# Patient Record
Sex: MALE | Race: WHITE
[De-identification: names, ages, dates, MRNs, and addresses within clinical notes are randomized per-mention and may not be internally consistent; named-entity substitution may affect disease eponyms.]

---

## 2017-04-05 ENCOUNTER — MEDICATION RENEWAL (OUTPATIENT)
Age: 62
End: 2017-04-05

## 2017-05-16 ENCOUNTER — APPOINTMENT (OUTPATIENT)
Dept: CARDIOLOGY | Facility: CLINIC | Age: 62
End: 2017-05-16

## 2017-05-16 VITALS — BODY MASS INDEX: 39.4 KG/M2 | HEIGHT: 69 IN | WEIGHT: 266 LBS

## 2017-05-16 VITALS — RESPIRATION RATE: 18 BRPM | DIASTOLIC BLOOD PRESSURE: 80 MMHG | SYSTOLIC BLOOD PRESSURE: 124 MMHG | HEART RATE: 64 BPM

## 2017-11-14 ENCOUNTER — APPOINTMENT (OUTPATIENT)
Dept: CARDIOLOGY | Facility: CLINIC | Age: 62
End: 2017-11-14

## 2017-11-14 VITALS — HEART RATE: 72 BPM | DIASTOLIC BLOOD PRESSURE: 80 MMHG | RESPIRATION RATE: 18 BRPM | SYSTOLIC BLOOD PRESSURE: 118 MMHG

## 2017-11-14 VITALS — HEIGHT: 69 IN | BODY MASS INDEX: 40.14 KG/M2 | WEIGHT: 271 LBS

## 2017-11-14 RX ORDER — TESTOSTERONE CYPIONATE 200 MG/ML
200 INJECTION, SOLUTION INTRAMUSCULAR
Qty: 10 | Refills: 0 | Status: ACTIVE | COMMUNITY
Start: 2017-11-07

## 2018-05-03 ENCOUNTER — RX RENEWAL (OUTPATIENT)
Age: 63
End: 2018-05-03

## 2018-05-15 ENCOUNTER — APPOINTMENT (OUTPATIENT)
Dept: CARDIOLOGY | Facility: CLINIC | Age: 63
End: 2018-05-15

## 2018-05-15 VITALS — HEART RATE: 56 BPM | DIASTOLIC BLOOD PRESSURE: 70 MMHG | RESPIRATION RATE: 18 BRPM | SYSTOLIC BLOOD PRESSURE: 120 MMHG

## 2018-05-15 VITALS — BODY MASS INDEX: 37.62 KG/M2 | HEIGHT: 69 IN | WEIGHT: 254 LBS

## 2018-08-28 ENCOUNTER — INPATIENT (INPATIENT)
Facility: HOSPITAL | Age: 63
LOS: 4 days | Discharge: HOME | End: 2018-09-02
Attending: INTERNAL MEDICINE | Admitting: INTERNAL MEDICINE

## 2018-08-28 VITALS
WEIGHT: 250 LBS | TEMPERATURE: 100 F | HEART RATE: 81 BPM | HEIGHT: 71 IN | RESPIRATION RATE: 19 BRPM | SYSTOLIC BLOOD PRESSURE: 151 MMHG | OXYGEN SATURATION: 98 % | DIASTOLIC BLOOD PRESSURE: 68 MMHG

## 2018-08-28 DIAGNOSIS — Z98.890 OTHER SPECIFIED POSTPROCEDURAL STATES: Chronic | ICD-10-CM

## 2018-08-28 LAB
ALBUMIN SERPL ELPH-MCNC: 4.7 G/DL — SIGNIFICANT CHANGE UP (ref 3.5–5.2)
ALP SERPL-CCNC: 67 U/L — SIGNIFICANT CHANGE UP (ref 30–115)
ALT FLD-CCNC: 29 U/L — SIGNIFICANT CHANGE UP (ref 0–41)
ANION GAP SERPL CALC-SCNC: 17 MMOL/L — HIGH (ref 7–14)
APPEARANCE UR: ABNORMAL
APTT BLD: 23.3 SEC — CRITICAL LOW (ref 27–39.2)
AST SERPL-CCNC: 24 U/L — SIGNIFICANT CHANGE UP (ref 0–41)
BACTERIA # UR AUTO: ABNORMAL
BASOPHILS # BLD AUTO: 0.06 K/UL — SIGNIFICANT CHANGE UP (ref 0–0.2)
BASOPHILS NFR BLD AUTO: 0.2 % — SIGNIFICANT CHANGE UP (ref 0–1)
BILIRUB SERPL-MCNC: 2.1 MG/DL — HIGH (ref 0.2–1.2)
BILIRUB UR-MCNC: NEGATIVE — SIGNIFICANT CHANGE UP
BUN SERPL-MCNC: 16 MG/DL — SIGNIFICANT CHANGE UP (ref 10–20)
CALCIUM SERPL-MCNC: 9.8 MG/DL — SIGNIFICANT CHANGE UP (ref 8.5–10.1)
CHLORIDE SERPL-SCNC: 93 MMOL/L — LOW (ref 98–110)
CO2 SERPL-SCNC: 25 MMOL/L — SIGNIFICANT CHANGE UP (ref 17–32)
COLOR SPEC: SIGNIFICANT CHANGE UP
COMMENT - URINE: SIGNIFICANT CHANGE UP
CREAT SERPL-MCNC: 0.9 MG/DL — SIGNIFICANT CHANGE UP (ref 0.7–1.5)
DIFF PNL FLD: ABNORMAL
EOSINOPHIL # BLD AUTO: 0.01 K/UL — SIGNIFICANT CHANGE UP (ref 0–0.7)
EOSINOPHIL NFR BLD AUTO: 0 % — SIGNIFICANT CHANGE UP (ref 0–8)
EPI CELLS # UR: ABNORMAL /HPF
GLUCOSE SERPL-MCNC: 138 MG/DL — HIGH (ref 70–99)
GLUCOSE UR QL: NEGATIVE MG/DL — SIGNIFICANT CHANGE UP
HCT VFR BLD CALC: 47 % — SIGNIFICANT CHANGE UP (ref 42–52)
HGB BLD-MCNC: 16.6 G/DL — SIGNIFICANT CHANGE UP (ref 14–18)
IMM GRANULOCYTES NFR BLD AUTO: 3.7 % — HIGH (ref 0.1–0.3)
INR BLD: 1.22 RATIO — SIGNIFICANT CHANGE UP (ref 0.65–1.3)
KETONES UR-MCNC: ABNORMAL
LACTATE SERPL-SCNC: 2.4 MMOL/L — HIGH (ref 0.5–2.2)
LEUKOCYTE ESTERASE UR-ACNC: ABNORMAL
LYMPHOCYTES # BLD AUTO: 0.51 K/UL — LOW (ref 1.2–3.4)
LYMPHOCYTES # BLD AUTO: 1.6 % — LOW (ref 20.5–51.1)
MCHC RBC-ENTMCNC: 31.3 PG — HIGH (ref 27–31)
MCHC RBC-ENTMCNC: 35.3 G/DL — SIGNIFICANT CHANGE UP (ref 32–37)
MCV RBC AUTO: 88.7 FL — SIGNIFICANT CHANGE UP (ref 80–94)
MONOCYTES # BLD AUTO: 2 K/UL — HIGH (ref 0.1–0.6)
MONOCYTES NFR BLD AUTO: 6.4 % — SIGNIFICANT CHANGE UP (ref 1.7–9.3)
NEUTROPHILS # BLD AUTO: 27.31 K/UL — HIGH (ref 1.4–6.5)
NEUTROPHILS NFR BLD AUTO: 88.1 % — HIGH (ref 42.2–75.2)
NITRITE UR-MCNC: NEGATIVE — SIGNIFICANT CHANGE UP
NRBC # BLD: 0 /100 WBCS — SIGNIFICANT CHANGE UP (ref 0–0)
PH UR: 6 — SIGNIFICANT CHANGE UP (ref 5–8)
PLATELET # BLD AUTO: 202 K/UL — SIGNIFICANT CHANGE UP (ref 130–400)
POTASSIUM SERPL-MCNC: 3.7 MMOL/L — SIGNIFICANT CHANGE UP (ref 3.5–5)
POTASSIUM SERPL-SCNC: 3.7 MMOL/L — SIGNIFICANT CHANGE UP (ref 3.5–5)
PROT SERPL-MCNC: 7.5 G/DL — SIGNIFICANT CHANGE UP (ref 6–8)
PROT UR-MCNC: 30 MG/DL
PROTHROM AB SERPL-ACNC: 13.2 SEC — HIGH (ref 9.95–12.87)
RBC # BLD: 5.3 M/UL — SIGNIFICANT CHANGE UP (ref 4.7–6.1)
RBC # FLD: 11.8 % — SIGNIFICANT CHANGE UP (ref 11.5–14.5)
RBC CASTS # UR COMP ASSIST: SIGNIFICANT CHANGE UP /HPF
SODIUM SERPL-SCNC: 135 MMOL/L — SIGNIFICANT CHANGE UP (ref 135–146)
SP GR SPEC: 1.02 — SIGNIFICANT CHANGE UP (ref 1.01–1.03)
UROBILINOGEN FLD QL: 0.2 MG/DL — SIGNIFICANT CHANGE UP (ref 0.2–0.2)
WBC # BLD: 31.05 K/UL — HIGH (ref 4.8–10.8)
WBC # FLD AUTO: 31.05 K/UL — HIGH (ref 4.8–10.8)
WBC UR QL: ABNORMAL /HPF

## 2018-08-28 RX ORDER — CEFEPIME 1 G/1
INJECTION, POWDER, FOR SOLUTION INTRAMUSCULAR; INTRAVENOUS
Qty: 0 | Refills: 0 | Status: DISCONTINUED | OUTPATIENT
Start: 2018-08-28 | End: 2018-08-28

## 2018-08-28 RX ORDER — MORPHINE SULFATE 50 MG/1
8 CAPSULE, EXTENDED RELEASE ORAL ONCE
Qty: 0 | Refills: 0 | Status: DISCONTINUED | OUTPATIENT
Start: 2018-08-28 | End: 2018-08-28

## 2018-08-28 RX ORDER — CEFEPIME 1 G/1
2000 INJECTION, POWDER, FOR SOLUTION INTRAMUSCULAR; INTRAVENOUS ONCE
Qty: 0 | Refills: 0 | Status: COMPLETED | OUTPATIENT
Start: 2018-08-28 | End: 2018-08-28

## 2018-08-28 RX ADMIN — MORPHINE SULFATE 8 MILLIGRAM(S): 50 CAPSULE, EXTENDED RELEASE ORAL at 18:21

## 2018-08-28 RX ADMIN — CEFEPIME 100 MILLIGRAM(S): 1 INJECTION, POWDER, FOR SOLUTION INTRAMUSCULAR; INTRAVENOUS at 19:28

## 2018-08-28 NOTE — ED ADULT NURSE NOTE - NSIMPLEMENTINTERV_GEN_ALL_ED
Implemented All Universal Safety Interventions:  [place] to call system. Call bell, personal items and telephone within reach. Instruct patient to call for assistance. Room bathroom lighting operational. Non-slip footwear when patient is off stretcher. Physically safe environment: no spills, clutter or unnecessary equipment. Stretcher in lowest position, wheels locked, appropriate side rails in place.

## 2018-08-28 NOTE — ED PROVIDER NOTE - OBJECTIVE STATEMENT
pt is a 62 yom with a hx of hernia s/p repair 30 years ago, hypercholesterolemia, gout, htn presents with r sided testicular pain and swelling for 1 day. sx came on suddenly yesterday at home. pt got no sleep last night due to the pain. pt tried 800 mg of ibuprofen and it didn't get better. pt also endorses a low grade fever. pt denies other abdominal pain, chest pain, sob, cough, dysuria, pyuria.

## 2018-08-28 NOTE — ED PROVIDER NOTE - PHYSICAL EXAMINATION
CONSTITUTIONAL: Well-developed; well-nourished; in no acute distress.   SKIN: warm, dry  HEAD: Normocephalic; atraumatic.  EYES: PERRL, EOMI, normal sclera and conjunctiva   ENT: No nasal discharge; airway clear.  NECK: Supple; non tender.  CARD: S1, S2 normal; no murmurs, gallops, or rubs. Regular rate and rhythm.   RESP: No wheezes, rales or rhonchi.  ABD: soft ntnd  EXT: Normal ROM.  No clubbing, cyanosis or edema.   LYMPH: No acute cervical adenopathy.  NEURO: Alert, oriented, grossly unremarkable  PSYCH: Cooperative, appropriate.  : R sided testicular tenderness and significant swelling. absent cremasteric reflex.

## 2018-08-28 NOTE — ED PROVIDER NOTE - NS ED ROS FT
Eyes:  No visual changes, eye pain or discharge.  ENMT:  No hearing changes, pain, discharge or infections. No neck pain or stiffness.  Cardiac:  No chest pain, SOB or edema. No chest pain with exertion.  Respiratory:  No cough or respiratory distress. No hemoptysis. No history of asthma or RAD.  GI:  No nausea, vomiting, diarrhea or abdominal pain.  :  No dysuria, frequency or burning. +testicular pain  MS:  No myalgia, muscle weakness, joint pain or back pain.  Neuro:  No headache or weakness.  No LOC.  Skin:  No skin rash.   Endocrine: No history of thyroid disease or diabetes.

## 2018-08-28 NOTE — ED ADULT TRIAGE NOTE - CHIEF COMPLAINT QUOTE
As per patient "Yesterday I noticed my right testicle was swollen". Denies trauma to area, denies changes in urination, denies discharge

## 2018-08-29 DIAGNOSIS — N49.2 INFLAMMATORY DISORDERS OF SCROTUM: ICD-10-CM

## 2018-08-29 LAB
HCT VFR BLD CALC: 43.7 % — SIGNIFICANT CHANGE UP (ref 42–52)
HGB BLD-MCNC: 15.1 G/DL — SIGNIFICANT CHANGE UP (ref 14–18)
MCHC RBC-ENTMCNC: 31.6 PG — HIGH (ref 27–31)
MCHC RBC-ENTMCNC: 34.6 G/DL — SIGNIFICANT CHANGE UP (ref 32–37)
MCV RBC AUTO: 91.4 FL — SIGNIFICANT CHANGE UP (ref 80–94)
NRBC # BLD: 0 /100 WBCS — SIGNIFICANT CHANGE UP (ref 0–0)
PLATELET # BLD AUTO: 158 K/UL — SIGNIFICANT CHANGE UP (ref 130–400)
RBC # BLD: 4.78 M/UL — SIGNIFICANT CHANGE UP (ref 4.7–6.1)
RBC # FLD: 11.9 % — SIGNIFICANT CHANGE UP (ref 11.5–14.5)
WBC # BLD: 31.71 K/UL — HIGH (ref 4.8–10.8)
WBC # FLD AUTO: 31.71 K/UL — HIGH (ref 4.8–10.8)

## 2018-08-29 RX ORDER — ALLOPURINOL 300 MG
100 TABLET ORAL DAILY
Qty: 0 | Refills: 0 | Status: DISCONTINUED | OUTPATIENT
Start: 2018-08-29 | End: 2018-09-02

## 2018-08-29 RX ORDER — CIPROFLOXACIN LACTATE 400MG/40ML
400 VIAL (ML) INTRAVENOUS EVERY 12 HOURS
Qty: 0 | Refills: 0 | Status: DISCONTINUED | OUTPATIENT
Start: 2018-08-29 | End: 2018-09-02

## 2018-08-29 RX ORDER — ATORVASTATIN CALCIUM 80 MG/1
20 TABLET, FILM COATED ORAL AT BEDTIME
Qty: 0 | Refills: 0 | Status: DISCONTINUED | OUTPATIENT
Start: 2018-08-29 | End: 2018-09-02

## 2018-08-29 RX ORDER — HEPARIN SODIUM 5000 [USP'U]/ML
5000 INJECTION INTRAVENOUS; SUBCUTANEOUS EVERY 8 HOURS
Qty: 0 | Refills: 0 | Status: DISCONTINUED | OUTPATIENT
Start: 2018-08-29 | End: 2018-09-02

## 2018-08-29 RX ORDER — DOCUSATE SODIUM 100 MG
100 CAPSULE ORAL
Qty: 0 | Refills: 0 | Status: DISCONTINUED | OUTPATIENT
Start: 2018-08-29 | End: 2018-09-02

## 2018-08-29 RX ORDER — MORPHINE SULFATE 50 MG/1
6 CAPSULE, EXTENDED RELEASE ORAL EVERY 4 HOURS
Qty: 0 | Refills: 0 | Status: DISCONTINUED | OUTPATIENT
Start: 2018-08-29 | End: 2018-09-02

## 2018-08-29 RX ORDER — MORPHINE SULFATE 50 MG/1
2 CAPSULE, EXTENDED RELEASE ORAL EVERY 4 HOURS
Qty: 0 | Refills: 0 | Status: DISCONTINUED | OUTPATIENT
Start: 2018-08-29 | End: 2018-09-02

## 2018-08-29 RX ORDER — MORPHINE SULFATE 50 MG/1
8 CAPSULE, EXTENDED RELEASE ORAL ONCE
Qty: 0 | Refills: 0 | Status: DISCONTINUED | OUTPATIENT
Start: 2018-08-29 | End: 2018-08-29

## 2018-08-29 RX ORDER — IBUPROFEN 200 MG
400 TABLET ORAL EVERY 8 HOURS
Qty: 0 | Refills: 0 | Status: DISCONTINUED | OUTPATIENT
Start: 2018-08-29 | End: 2018-09-02

## 2018-08-29 RX ORDER — ACETAMINOPHEN 500 MG
650 TABLET ORAL EVERY 6 HOURS
Qty: 0 | Refills: 0 | Status: DISCONTINUED | OUTPATIENT
Start: 2018-08-29 | End: 2018-09-02

## 2018-08-29 RX ORDER — LOSARTAN POTASSIUM 100 MG/1
100 TABLET, FILM COATED ORAL DAILY
Qty: 0 | Refills: 0 | Status: DISCONTINUED | OUTPATIENT
Start: 2018-08-29 | End: 2018-09-02

## 2018-08-29 RX ORDER — HYDROCHLOROTHIAZIDE 25 MG
25 TABLET ORAL DAILY
Qty: 0 | Refills: 0 | Status: DISCONTINUED | OUTPATIENT
Start: 2018-08-29 | End: 2018-09-02

## 2018-08-29 RX ADMIN — Medication 100 MILLIGRAM(S): at 13:19

## 2018-08-29 RX ADMIN — Medication 100 MILLIGRAM(S): at 14:07

## 2018-08-29 RX ADMIN — HEPARIN SODIUM 5000 UNIT(S): 5000 INJECTION INTRAVENOUS; SUBCUTANEOUS at 13:19

## 2018-08-29 RX ADMIN — MORPHINE SULFATE 8 MILLIGRAM(S): 50 CAPSULE, EXTENDED RELEASE ORAL at 00:42

## 2018-08-29 RX ADMIN — Medication 200 MILLIGRAM(S): at 06:40

## 2018-08-29 RX ADMIN — Medication 100 MILLIGRAM(S): at 22:45

## 2018-08-29 RX ADMIN — Medication 400 MILLIGRAM(S): at 14:10

## 2018-08-29 RX ADMIN — Medication 200 MILLIGRAM(S): at 17:45

## 2018-08-29 RX ADMIN — CEFEPIME 2000 MILLIGRAM(S): 1 INJECTION, POWDER, FOR SOLUTION INTRAMUSCULAR; INTRAVENOUS at 00:42

## 2018-08-29 RX ADMIN — Medication 400 MILLIGRAM(S): at 07:17

## 2018-08-29 RX ADMIN — Medication 100 MILLIGRAM(S): at 06:40

## 2018-08-29 RX ADMIN — MORPHINE SULFATE 2 MILLIGRAM(S): 50 CAPSULE, EXTENDED RELEASE ORAL at 20:01

## 2018-08-29 RX ADMIN — Medication 400 MILLIGRAM(S): at 06:40

## 2018-08-29 RX ADMIN — MORPHINE SULFATE 2 MILLIGRAM(S): 50 CAPSULE, EXTENDED RELEASE ORAL at 20:29

## 2018-08-29 RX ADMIN — Medication 400 MILLIGRAM(S): at 13:19

## 2018-08-29 NOTE — H&P ADULT - HISTORY OF PRESENT ILLNESS
pt is a 63 yo m h/o hypercholesterolemia, gout, htn presents with R sided testicular pain and swelling for 1 day. Also c/o fever, took Motrin for the pain with minimum relief.  pt denies  abdominal pain, chest pain, sob, cough, dysuria, pyuria, N/V/D.

## 2018-08-29 NOTE — CONSULT NOTE ADULT - ASSESSMENT
t is a 61 yo m h/o hypercholesterolemia, gout, htn presents with R sided testicular pain and swelling for 1 day. Also c/o fever, took Motrin for the pain with minimum relief.  pt denies  abdominal pain, chest pain, sob, cough, dysuria, pyuria, N/V/D.

## 2018-08-29 NOTE — PROGRESS NOTE ADULT - SUBJECTIVE AND OBJECTIVE BOX
S; Pt with improved scrotal pain and feeling better overall; afebrile overnight    O: Vital Signs Last 24 Hrs  T(C): 36.2 (29 Aug 2018 14:00), Max: 37.7 (28 Aug 2018 17:07)  T(F): 97.2 (29 Aug 2018 14:00), Max: 99.9 (28 Aug 2018 17:07)  HR: 61 (29 Aug 2018 14:00) (61 - 81)  BP: 106/49 (29 Aug 2018 14:00) (106/49 - 151/68)  RR: 18 (29 Aug 2018 14:00) (16 - 20)  SpO2: 95% (29 Aug 2018 04:00) (95% - 99%)    EXAM:    : Scrotal swelling (R>L); mild erythema and edema    Labs:                        15.1   31.71 )-----------( 158      ( 29 Aug 2018 12:03 )             43.7       Auto Immature Granulocyte %: 3.7 % (18 @ 18:00)  Auto Neutrophil %: 88.1 % (18 @ 18:00)        135  |  93<L>  |  16  ----------------------------<  138<H>  3.7   |  25  |  0.9      Calcium, Total Serum: 9.8 mg/dL (18 @ 18:00)      LFTs:             7.5  | 2.1  | 24       ------------------[67      ( 28 Aug 2018 18:00 )  4.7  | x    | 29           Lactate, Blood: 2.4 mmol/L (18 @ 18:00)      Coags:     13.20  ----< 1.22    ( 28 Aug 2018 18:00 )     23.3        Urinalysis Basic - ( 28 Aug 2018 22:50 )    Color: Wilma / Appearance: Slightly Cloudy / S.020 / pH: x  Gluc: x / Ketone: Trace  / Bili: Negative / Urobili: 0.2 mg/dL   Blood: x / Protein: 30 mg/dL / Nitrite: Negative   Leuk Esterase: Small / RBC: 1-2 /HPF / WBC 10-25 /HPF   Sq Epi: x / Non Sq Epi: Few /HPF / Bacteria: Few

## 2018-08-29 NOTE — H&P ADULT - NSHPLABSRESULTS_GEN_ALL_CORE
16.6   31.05 )-----------( 202      ( 28 Aug 2018 18:00 )             47.0       Urinalysis Basic - ( 28 Aug 2018 22:50 )    Color: Wilma / Appearance: Slightly Cloudy / S.020 / pH: x  Gluc: x / Ketone: Trace  / Bili: Negative / Urobili: 0.2 mg/dL   Blood: x / Protein: 30 mg/dL / Nitrite: Negative   Leuk Esterase: Small / RBC: 1-2 /HPF / WBC 10-25 /HPF   Sq Epi: x / Non Sq Epi: Few /HPF / Bacteria: Few    CT: Large right-sided fat-containing inguinal hernia with a trace amount of   fluid. Large right-sided hydrocele with edema of the scrotal subcutaneous   tissues. No discrete drainable abscess. No subcutaneous gas to suggest   Lillie's gangrene.

## 2018-08-29 NOTE — H&P ADULT - NSHPPHYSICALEXAM_GEN_ALL_CORE
vitals: ICU Vital Signs Last 24 Hrs  T(C): 37.1 (29 Aug 2018 01:42), Max: 37.7 (28 Aug 2018 17:07)  T(F): 98.7 (29 Aug 2018 01:42), Max: 99.9 (28 Aug 2018 17:07)  HR: 68 (29 Aug 2018 01:42) (68 - 81)  BP: 136/77 (29 Aug 2018 01:42) (136/77 - 151/68)  BP(mean): --  ABP: --  ABP(mean): --  RR: 20 (29 Aug 2018 01:42) (19 - 20)  SpO2: 99% (29 Aug 2018 01:42) (98% - 99%)                   CONSTITUTIONAL: Well-developed; well-nourished; in no acute distress.   	SKIN: warm, dry  	HEAD: Normocephalic; atraumatic.  	EYES: PERRL, EOMI, normal sclera and conjunctiva   	ENT: No nasal discharge; airway clear.  	NECK: Supple; non tender.  	CARD: S1, S2 normal; no murmurs, gallops, or rubs. Regular rate and rhythm.   	RESP: No wheezes, rales or rhonchi.  	ABD: soft ntnd  	EXT: Normal ROM.  No clubbing, cyanosis or edema.   	LYMPH: No acute cervical adenopathy.  	NEURO: Alert, oriented, grossly unremarkable  	PSYCH: Cooperative, appropriate.                : R sided testicular tenderness and significant swelling. absent cremasteric reflex.

## 2018-08-29 NOTE — H&P ADULT - ASSESSMENT
61 yo m p/w scrotal cellulitis and r sided inguinal hernia   -	Admit to medicine   -	Urology consult   -	IV abx  -	Repeat labs, vitals per unit   -	Dvt ppx  -	Heart healthy diet   -	surgery consult for elective hernia repair 61 yo m p/w scrotal cellulitis and r sided inguinal hernia   -	Admit to medicine   -	Urology consult   -	IV abx - IV cipro/ IV clinda (pt with reported allergy to PCN)   -	Repeat labs, vitals per unit   -	Dvt ppx  -	Heart healthy diet   -	surgery consult for elective hernia repair

## 2018-08-29 NOTE — CONSULT NOTE ADULT - SUBJECTIVE AND OBJECTIVE BOX
History of Present Illness:  Reason for Admission: testicular pain	  History of Present Illness: 	  pt is a 61 yo m h/o hypercholesterolemia, gout, htn presents with R sided testicular pain and swelling for 1 day. Also c/o fever, took Motrin for the pain with minimum relief.  pt denies  abdominal pain, chest pain, sob, cough, dysuria, pyuria, N/V/D.        Review of Systems:  Review of Systems: R sided testicular pain and swelling	  Other Review of Systems: All other review of systems negative, except as noted in HPI	      Allergies and Intolerances:        Allergies:  	penicillins: Drug Category, Rash    Home Medications:   * Patient Currently Takes Medications as of 28-Aug-2018 17:12 documented in Structured Notes  · 	allopurinol: orally once a day, Last Dose Taken:    · 	Avastin: intravenous once a day, Last Dose Taken:    · 	Lipitor: orally once a day, Last Dose Taken:    · 	losartan: orally once a day, Last Dose Taken:    · 	testosterone: Last Dose Taken:      .    Patient History:    Tobacco Screening:  · Core Measure Site	No	  · Has the patient used tobacco in the past 30 days?	No	    Risk Assessment:    Present on Admission:  Deep Venous Thrombosis	no	  Pulmonary Embolus	no	     Heart Failure:  Does this patient have a history of or has been diagnosed with heart failure? no.    HIV Screen (per [facility], HIV screening must be offered to every individual between ages 13 and 64)	Offered and patient declined	  Hepatitis C Screen (per [facility], hepatitis C screening must be offered to every individual born between 1945 and 1965)	Offered and patient declined	       Physical Exam:  Physical Exam: vitals: ICU Vital Signs Last 24 Hrs  T(C): 37.1 (29 Aug 2018 01:42), Max: 37.7 (28 Aug 2018 17:07)  T(F): 98.7 (29 Aug 2018 01:42), Max: 99.9 (28 Aug 2018 17:07)  HR: 68 (29 Aug 2018 01:42) (68 - 81)  BP: 136/77 (29 Aug 2018 01:42) (136/77 - 151/68)  BP(mean): --  ABP: --  ABP(mean): --  RR: 20 (29 Aug 2018 01:42) (19 - 20)  SpO2: 99% (29 Aug 2018 01:42) (98% - 99%)                 CONSTITUTIONAL: Well-developed; well-nourished; in no acute distress.    SKIN: warm, dry   HEAD: Normocephalic; atraumatic.   EYES: PERRL, EOMI, normal sclera and conjunctiva    ENT: No nasal discharge; airway clear.   NECK: Supple; non tender.   CARD: S1, S2 normal; no murmurs, gallops, or rubs. Regular rate and rhythm.    RESP: No wheezes, rales or rhonchi.   ABD: soft ntnd   EXT: Normal ROM.  No clubbing, cyanosis or edema.    LYMPH: No acute cervical adenopathy.   NEURO: Alert, oriented, grossly unremarkable   PSYCH: Cooperative, appropriate.               : R sided testicular tenderness and significant swelling. absent cremasteric reflex.	       Labs and Results:  Labs, Radiology, Cardiology, and Other Results: 16.6   31.05 )-----------( 202      ( 28 Aug 2018 18:00 )             47.0     Urinalysis Basic - ( 28 Aug 2018 22:50 )   Color: Wilma / Appearance: Slightly Cloudy / S.020 / pH: x  Gluc: x / Ketone: Trace  / Bili: Negative / Urobili: 0.2 mg/dL   Blood: x / Protein: 30 mg/dL / Nitrite: Negative   Leuk Esterase: Small / RBC: 1-2 /HPF / WBC 10-25 /HPF   Sq Epi: x / Non Sq Epi: Few /HPF / Bacteria: Few   CT: Large right-sided fat-containing inguinal hernia with a trace amount of   fluid. Large right-sided hydrocele with edema of the scrotal subcutaneous   tissues. No discrete drainable abscess. No subcutaneous gas to suggest  Lillie's gangrene.

## 2018-08-30 DIAGNOSIS — N45.1 EPIDIDYMITIS: ICD-10-CM

## 2018-08-30 DIAGNOSIS — K59.01 SLOW TRANSIT CONSTIPATION: ICD-10-CM

## 2018-08-30 LAB
ALBUMIN SERPL ELPH-MCNC: 3.8 G/DL — SIGNIFICANT CHANGE UP (ref 3.5–5.2)
ALP SERPL-CCNC: 85 U/L — SIGNIFICANT CHANGE UP (ref 30–115)
ALT FLD-CCNC: 16 U/L — SIGNIFICANT CHANGE UP (ref 0–41)
ANION GAP SERPL CALC-SCNC: 11 MMOL/L — SIGNIFICANT CHANGE UP (ref 7–14)
AST SERPL-CCNC: 16 U/L — SIGNIFICANT CHANGE UP (ref 0–41)
BILIRUB SERPL-MCNC: 1 MG/DL — SIGNIFICANT CHANGE UP (ref 0.2–1.2)
BUN SERPL-MCNC: 22 MG/DL — HIGH (ref 10–20)
CALCIUM SERPL-MCNC: 8.7 MG/DL — SIGNIFICANT CHANGE UP (ref 8.5–10.1)
CHLORIDE SERPL-SCNC: 94 MMOL/L — LOW (ref 98–110)
CO2 SERPL-SCNC: 32 MMOL/L — SIGNIFICANT CHANGE UP (ref 17–32)
CREAT SERPL-MCNC: 1 MG/DL — SIGNIFICANT CHANGE UP (ref 0.7–1.5)
GLUCOSE SERPL-MCNC: 101 MG/DL — HIGH (ref 70–99)
HCT VFR BLD CALC: 41.2 % — LOW (ref 42–52)
HGB BLD-MCNC: 14.1 G/DL — SIGNIFICANT CHANGE UP (ref 14–18)
MCHC RBC-ENTMCNC: 31.1 PG — HIGH (ref 27–31)
MCHC RBC-ENTMCNC: 34.2 G/DL — SIGNIFICANT CHANGE UP (ref 32–37)
MCV RBC AUTO: 90.9 FL — SIGNIFICANT CHANGE UP (ref 80–94)
NRBC # BLD: 0 /100 WBCS — SIGNIFICANT CHANGE UP (ref 0–0)
PLATELET # BLD AUTO: 156 K/UL — SIGNIFICANT CHANGE UP (ref 130–400)
POTASSIUM SERPL-MCNC: 3.5 MMOL/L — SIGNIFICANT CHANGE UP (ref 3.5–5)
POTASSIUM SERPL-SCNC: 3.5 MMOL/L — SIGNIFICANT CHANGE UP (ref 3.5–5)
PROT SERPL-MCNC: 6.4 G/DL — SIGNIFICANT CHANGE UP (ref 6–8)
RBC # BLD: 4.53 M/UL — LOW (ref 4.7–6.1)
RBC # FLD: 11.8 % — SIGNIFICANT CHANGE UP (ref 11.5–14.5)
SODIUM SERPL-SCNC: 137 MMOL/L — SIGNIFICANT CHANGE UP (ref 135–146)
WBC # BLD: 26.32 K/UL — HIGH (ref 4.8–10.8)
WBC # FLD AUTO: 26.32 K/UL — HIGH (ref 4.8–10.8)

## 2018-08-30 RX ORDER — MAGNESIUM HYDROXIDE 400 MG/1
30 TABLET, CHEWABLE ORAL DAILY
Qty: 0 | Refills: 0 | Status: DISCONTINUED | OUTPATIENT
Start: 2018-08-30 | End: 2018-09-02

## 2018-08-30 RX ORDER — POLYETHYLENE GLYCOL 3350 17 G/17G
17 POWDER, FOR SOLUTION ORAL DAILY
Qty: 0 | Refills: 0 | Status: DISCONTINUED | OUTPATIENT
Start: 2018-08-30 | End: 2018-09-02

## 2018-08-30 RX ADMIN — Medication 400 MILLIGRAM(S): at 13:47

## 2018-08-30 RX ADMIN — LOSARTAN POTASSIUM 100 MILLIGRAM(S): 100 TABLET, FILM COATED ORAL at 05:11

## 2018-08-30 RX ADMIN — Medication 5 MILLIGRAM(S): at 22:06

## 2018-08-30 RX ADMIN — Medication 100 MILLIGRAM(S): at 13:47

## 2018-08-30 RX ADMIN — ATORVASTATIN CALCIUM 20 MILLIGRAM(S): 80 TABLET, FILM COATED ORAL at 22:05

## 2018-08-30 RX ADMIN — MORPHINE SULFATE 2 MILLIGRAM(S): 50 CAPSULE, EXTENDED RELEASE ORAL at 06:00

## 2018-08-30 RX ADMIN — Medication 100 MILLIGRAM(S): at 22:05

## 2018-08-30 RX ADMIN — MORPHINE SULFATE 2 MILLIGRAM(S): 50 CAPSULE, EXTENDED RELEASE ORAL at 12:17

## 2018-08-30 RX ADMIN — MAGNESIUM HYDROXIDE 30 MILLILITER(S): 400 TABLET, CHEWABLE ORAL at 08:14

## 2018-08-30 RX ADMIN — Medication 100 MILLIGRAM(S): at 18:17

## 2018-08-30 RX ADMIN — Medication 100 MILLIGRAM(S): at 05:11

## 2018-08-30 RX ADMIN — POLYETHYLENE GLYCOL 3350 17 GRAM(S): 17 POWDER, FOR SOLUTION ORAL at 13:46

## 2018-08-30 RX ADMIN — Medication 100 MILLIGRAM(S): at 05:12

## 2018-08-30 RX ADMIN — MORPHINE SULFATE 2 MILLIGRAM(S): 50 CAPSULE, EXTENDED RELEASE ORAL at 05:03

## 2018-08-30 RX ADMIN — Medication 200 MILLIGRAM(S): at 18:17

## 2018-08-30 RX ADMIN — Medication 100 MILLIGRAM(S): at 11:59

## 2018-08-30 RX ADMIN — Medication 200 MILLIGRAM(S): at 05:06

## 2018-08-30 NOTE — PROGRESS NOTE ADULT - SUBJECTIVE AND OBJECTIVE BOX
S; Pt with improved scrotal pain and feeling better overall; afebrile overnight      States having a hernia on right side repaired by Dr. Avila 20 years ago., and thinks his hernia has returned.      Last BM 2 days ago started on laxatives     O:   Vital Signs Last 24 Hrs  T(C): 36.8 (30 Aug 2018 05:34), Max: 36.8 (30 Aug 2018 05:34)  T(F): 98.2 (30 Aug 2018 05:34), Max: 98.2 (30 Aug 2018 05:34)  HR: 64 (30 Aug 2018 05:34) (59 - 64)  BP: 100/54 (30 Aug 2018 05:34) (100/54 - 115/63)  RR: 16 (30 Aug 2018 05:34) (16 - 18)      EXAM:    : Scrotal swelling (R>L); mild erythema and edema ++, large non reducible right inguinal hernia     Labs:           pending

## 2018-08-30 NOTE — PROGRESS NOTE ADULT - SUBJECTIVE AND OBJECTIVE BOX
NELSON CROWE  62y  Male    Patient is a 62y old  Male who presents with a chief complaint of testicular pain (29 Aug 2018 02:57)    ALLERGIES:  penicillins (Rash)      INTERVAL HPI/OVERNIGHT EVENTS:    VITALS:  T(F): 98.2 (08-30-18 @ 05:34), Max: 98.2 (08-30-18 @ 05:34)  HR: 65 (08-30-18 @ 12:10) (59 - 65)  BP: 112/56 (08-30-18 @ 12:10) (100/54 - 115/63)  RR: 16 (08-30-18 @ 12:10) (16 - 18)  SpO2: 95% (08-30-18 @ 12:10) (95% - 95%)    LABS:  08-28    135  |  93<L>  |  16  ----------------------------<  138<H>  3.7   |  25  |  0.9    Ca    9.8      28 Aug 2018 18:00    TPro  7.5  /  Alb  4.7  /  TBili  2.1<H>  /  DBili  x   /  AST  24  /  ALT  29  /  AlkPhos  67  08-28    MICROBIOLOGY:    MEDICATION:  acetaminophen   Tablet. 650 milliGRAM(s) Oral every 6 hours PRN  allopurinol 100 milliGRAM(s) Oral daily  atorvastatin 20 milliGRAM(s) Oral at bedtime  ciprofloxacin   IVPB 400 milliGRAM(s) IV Intermittent every 12 hours  clindamycin IVPB 600 milliGRAM(s) IV Intermittent every 8 hours  docusate sodium 100 milliGRAM(s) Oral two times a day  heparin  Injectable 5000 Unit(s) SubCutaneous every 8 hours  hydrochlorothiazide 25 milliGRAM(s) Oral daily  ibuprofen  Tablet 400 milliGRAM(s) Oral every 8 hours  losartan 100 milliGRAM(s) Oral daily  magnesium hydroxide Suspension 30 milliLiter(s) Oral daily PRN  morphine  - Injectable 2 milliGRAM(s) IV Push every 4 hours PRN  morphine  - Injectable 6 milliGRAM(s) IV Push every 4 hours PRN  polyethylene glycol 3350 17 Gram(s) Oral daily    RADIOLOGY & ADDITIONAL TESTS:

## 2018-08-30 NOTE — PROGRESS NOTE ADULT - SUBJECTIVE AND OBJECTIVE BOX
Patient states scrotal pain is better  Abdomen  Soft, non-distended  :  No CVA tenderness  Scrotal erythema without fluctuance  Right inguinal hernia.

## 2018-08-31 LAB
ANION GAP SERPL CALC-SCNC: 11 MMOL/L — SIGNIFICANT CHANGE UP (ref 7–14)
BUN SERPL-MCNC: 22 MG/DL — HIGH (ref 10–20)
CALCIUM SERPL-MCNC: 8.5 MG/DL — SIGNIFICANT CHANGE UP (ref 8.5–10.1)
CHLORIDE SERPL-SCNC: 96 MMOL/L — LOW (ref 98–110)
CO2 SERPL-SCNC: 31 MMOL/L — SIGNIFICANT CHANGE UP (ref 17–32)
CREAT SERPL-MCNC: 1 MG/DL — SIGNIFICANT CHANGE UP (ref 0.7–1.5)
GLUCOSE SERPL-MCNC: 89 MG/DL — SIGNIFICANT CHANGE UP (ref 70–99)
HCT VFR BLD CALC: 41.3 % — LOW (ref 42–52)
HGB BLD-MCNC: 14.4 G/DL — SIGNIFICANT CHANGE UP (ref 14–18)
MCHC RBC-ENTMCNC: 31.6 PG — HIGH (ref 27–31)
MCHC RBC-ENTMCNC: 34.9 G/DL — SIGNIFICANT CHANGE UP (ref 32–37)
MCV RBC AUTO: 90.6 FL — SIGNIFICANT CHANGE UP (ref 80–94)
NRBC # BLD: 0 /100 WBCS — SIGNIFICANT CHANGE UP (ref 0–0)
PLATELET # BLD AUTO: 160 K/UL — SIGNIFICANT CHANGE UP (ref 130–400)
POTASSIUM SERPL-MCNC: 3.4 MMOL/L — LOW (ref 3.5–5)
POTASSIUM SERPL-SCNC: 3.4 MMOL/L — LOW (ref 3.5–5)
RBC # BLD: 4.56 M/UL — LOW (ref 4.7–6.1)
RBC # FLD: 11.8 % — SIGNIFICANT CHANGE UP (ref 11.5–14.5)
SODIUM SERPL-SCNC: 138 MMOL/L — SIGNIFICANT CHANGE UP (ref 135–146)
WBC # BLD: 20.39 K/UL — HIGH (ref 4.8–10.8)
WBC # FLD AUTO: 20.39 K/UL — HIGH (ref 4.8–10.8)

## 2018-08-31 RX ORDER — MULTIVIT WITH MIN/MFOLATE/K2 340-15/3 G
296 POWDER (GRAM) ORAL ONCE
Qty: 0 | Refills: 0 | Status: COMPLETED | OUTPATIENT
Start: 2018-08-31 | End: 2018-08-31

## 2018-08-31 RX ADMIN — ATORVASTATIN CALCIUM 20 MILLIGRAM(S): 80 TABLET, FILM COATED ORAL at 22:11

## 2018-08-31 RX ADMIN — Medication 100 MILLIGRAM(S): at 22:11

## 2018-08-31 RX ADMIN — Medication 100 MILLIGRAM(S): at 05:34

## 2018-08-31 RX ADMIN — Medication 400 MILLIGRAM(S): at 05:40

## 2018-08-31 RX ADMIN — Medication 400 MILLIGRAM(S): at 05:34

## 2018-08-31 RX ADMIN — Medication 400 MILLIGRAM(S): at 14:11

## 2018-08-31 RX ADMIN — MORPHINE SULFATE 2 MILLIGRAM(S): 50 CAPSULE, EXTENDED RELEASE ORAL at 05:44

## 2018-08-31 RX ADMIN — Medication 100 MILLIGRAM(S): at 11:13

## 2018-08-31 RX ADMIN — MORPHINE SULFATE 2 MILLIGRAM(S): 50 CAPSULE, EXTENDED RELEASE ORAL at 05:46

## 2018-08-31 RX ADMIN — Medication 296 MILLILITER(S): at 10:58

## 2018-08-31 RX ADMIN — Medication 200 MILLIGRAM(S): at 18:45

## 2018-08-31 RX ADMIN — Medication 400 MILLIGRAM(S): at 22:11

## 2018-08-31 RX ADMIN — Medication 25 MILLIGRAM(S): at 05:34

## 2018-08-31 RX ADMIN — LOSARTAN POTASSIUM 100 MILLIGRAM(S): 100 TABLET, FILM COATED ORAL at 05:34

## 2018-08-31 RX ADMIN — Medication 200 MILLIGRAM(S): at 05:34

## 2018-08-31 RX ADMIN — Medication 100 MILLIGRAM(S): at 14:11

## 2018-08-31 NOTE — PROGRESS NOTE ADULT - SUBJECTIVE AND OBJECTIVE BOX
Patient feels better.  Voiding well.  Abdomen:  Soft  :  Scrotal cellulitis mildly improved.  No fluctuance.

## 2018-08-31 NOTE — PROGRESS NOTE ADULT - PROBLEM SELECTOR PLAN 1
continue antibiotics  Scrotal support
continue antibiotics  scrotal elevation  check urine culture
with scrotal cellulitis  please check urine culture  GS to evaluate RIH  Scrotal elevation.
Miralax

## 2018-08-31 NOTE — PROGRESS NOTE ADULT - SUBJECTIVE AND OBJECTIVE BOX
S; Pt with improved scrotal pain and feeling better overall; afebrile overnight      Swelling is also better.      O:   Vital Signs Last 24 Hrs  T(C): 36.7 (31 Aug 2018 06:02), Max: 36.9 (30 Aug 2018 14:12)  T(F): 98 (31 Aug 2018 06:02), Max: 98.5 (30 Aug 2018 14:12)  HR: 63 (31 Aug 2018 06:02) (60 - 65)  BP: 131/71 (31 Aug 2018 06:02) (94/52 - 131/71)  RR: 16 (31 Aug 2018 06:02) (16 - 16)  SpO2: 95% (30 Aug 2018 12:10) (95% - 95%)      EXAM:    : Scrotal swelling (R>L); mild erythema and edema +, large non reducible right inguinal hernia     Labs:            08-30    137  |  94<L>  |  22<H>  ----------------------------<  101<H>  3.5   |  32  |  1.0                        14.1   26.32 )-----------( 156      ( 30 Aug 2018 15:21 )             41.2     Ca    8.7      30 Aug 2018 15:21    TPro  6.4  /  Alb  3.8  /  TBili  1.0  /  DBili  x   /  AST  16  /  ALT  16  /  AlkPhos  85  08-30

## 2018-08-31 NOTE — PROGRESS NOTE ADULT - SUBJECTIVE AND OBJECTIVE BOX
NELSON CROWE  62y  Male    Patient is a 62y old  Male who presents with a chief complaint of testicular pain (29 Aug 2018 02:57)    ALLERGIES:  penicillins (Rash)      INTERVAL HPI/OVERNIGHT EVENTS:    VITALS:  T(F): 98 (08-31-18 @ 06:02), Max: 98.5 (08-30-18 @ 14:12)  HR: 63 (08-31-18 @ 06:02) (60 - 65)  BP: 131/71 (08-31-18 @ 06:02) (94/52 - 131/71)  RR: 16 (08-31-18 @ 06:02) (16 - 16)  SpO2: 95% (08-30-18 @ 12:10) (95% - 95%)    LABS:  08-30    137  |  94<L>  |  22<H>  ----------------------------<  101<H>  3.5   |  32  |  1.0    Ca    8.7      30 Aug 2018 15:21    TPro  6.4  /  Alb  3.8  /  TBili  1.0  /  DBili  x   /  AST  16  /  ALT  16  /  AlkPhos  85  08-30    MICROBIOLOGY:    MEDICATION:  acetaminophen   Tablet. 650 milliGRAM(s) Oral every 6 hours PRN  allopurinol 100 milliGRAM(s) Oral daily  atorvastatin 20 milliGRAM(s) Oral at bedtime  ciprofloxacin   IVPB 400 milliGRAM(s) IV Intermittent every 12 hours  clindamycin IVPB 600 milliGRAM(s) IV Intermittent every 8 hours  docusate sodium 100 milliGRAM(s) Oral two times a day  heparin  Injectable 5000 Unit(s) SubCutaneous every 8 hours  hydrochlorothiazide 25 milliGRAM(s) Oral daily  ibuprofen  Tablet 400 milliGRAM(s) Oral every 8 hours  losartan 100 milliGRAM(s) Oral daily  magnesium citrate Solution 296 milliLiter(s) Oral once  magnesium hydroxide Suspension 30 milliLiter(s) Oral daily PRN  morphine  - Injectable 2 milliGRAM(s) IV Push every 4 hours PRN  morphine  - Injectable 6 milliGRAM(s) IV Push every 4 hours PRN  polyethylene glycol 3350 17 Gram(s) Oral daily    RADIOLOGY & ADDITIONAL TESTS:

## 2018-09-01 LAB
ANION GAP SERPL CALC-SCNC: 14 MMOL/L — SIGNIFICANT CHANGE UP (ref 7–14)
BUN SERPL-MCNC: 19 MG/DL — SIGNIFICANT CHANGE UP (ref 10–20)
CALCIUM SERPL-MCNC: 8.9 MG/DL — SIGNIFICANT CHANGE UP (ref 8.5–10.1)
CHLORIDE SERPL-SCNC: 92 MMOL/L — LOW (ref 98–110)
CO2 SERPL-SCNC: 32 MMOL/L — SIGNIFICANT CHANGE UP (ref 17–32)
CREAT SERPL-MCNC: 1.1 MG/DL — SIGNIFICANT CHANGE UP (ref 0.7–1.5)
GLUCOSE SERPL-MCNC: 105 MG/DL — HIGH (ref 70–99)
HCT VFR BLD CALC: 43.8 % — SIGNIFICANT CHANGE UP (ref 42–52)
HGB BLD-MCNC: 15.1 G/DL — SIGNIFICANT CHANGE UP (ref 14–18)
MCHC RBC-ENTMCNC: 31.3 PG — HIGH (ref 27–31)
MCHC RBC-ENTMCNC: 34.5 G/DL — SIGNIFICANT CHANGE UP (ref 32–37)
MCV RBC AUTO: 90.9 FL — SIGNIFICANT CHANGE UP (ref 80–94)
NRBC # BLD: 0 /100 WBCS — SIGNIFICANT CHANGE UP (ref 0–0)
PLATELET # BLD AUTO: 190 K/UL — SIGNIFICANT CHANGE UP (ref 130–400)
POTASSIUM SERPL-MCNC: 3.3 MMOL/L — LOW (ref 3.5–5)
POTASSIUM SERPL-SCNC: 3.3 MMOL/L — LOW (ref 3.5–5)
RBC # BLD: 4.82 M/UL — SIGNIFICANT CHANGE UP (ref 4.7–6.1)
RBC # FLD: 11.8 % — SIGNIFICANT CHANGE UP (ref 11.5–14.5)
SODIUM SERPL-SCNC: 138 MMOL/L — SIGNIFICANT CHANGE UP (ref 135–146)
WBC # BLD: 14.48 K/UL — HIGH (ref 4.8–10.8)
WBC # FLD AUTO: 14.48 K/UL — HIGH (ref 4.8–10.8)

## 2018-09-01 RX ADMIN — Medication 100 MILLIGRAM(S): at 11:14

## 2018-09-01 RX ADMIN — Medication 400 MILLIGRAM(S): at 05:33

## 2018-09-01 RX ADMIN — Medication 400 MILLIGRAM(S): at 14:37

## 2018-09-01 RX ADMIN — MORPHINE SULFATE 2 MILLIGRAM(S): 50 CAPSULE, EXTENDED RELEASE ORAL at 06:52

## 2018-09-01 RX ADMIN — MORPHINE SULFATE 2 MILLIGRAM(S): 50 CAPSULE, EXTENDED RELEASE ORAL at 23:07

## 2018-09-01 RX ADMIN — MORPHINE SULFATE 2 MILLIGRAM(S): 50 CAPSULE, EXTENDED RELEASE ORAL at 06:46

## 2018-09-01 RX ADMIN — MORPHINE SULFATE 2 MILLIGRAM(S): 50 CAPSULE, EXTENDED RELEASE ORAL at 23:58

## 2018-09-01 RX ADMIN — Medication 200 MILLIGRAM(S): at 05:34

## 2018-09-01 RX ADMIN — Medication 100 MILLIGRAM(S): at 22:11

## 2018-09-01 RX ADMIN — Medication 400 MILLIGRAM(S): at 23:59

## 2018-09-01 RX ADMIN — Medication 400 MILLIGRAM(S): at 14:12

## 2018-09-01 RX ADMIN — Medication 200 MILLIGRAM(S): at 17:01

## 2018-09-01 RX ADMIN — HEPARIN SODIUM 5000 UNIT(S): 5000 INJECTION INTRAVENOUS; SUBCUTANEOUS at 14:10

## 2018-09-01 RX ADMIN — Medication 400 MILLIGRAM(S): at 00:00

## 2018-09-01 RX ADMIN — Medication 400 MILLIGRAM(S): at 05:39

## 2018-09-01 RX ADMIN — LOSARTAN POTASSIUM 100 MILLIGRAM(S): 100 TABLET, FILM COATED ORAL at 05:33

## 2018-09-01 RX ADMIN — Medication 400 MILLIGRAM(S): at 22:10

## 2018-09-01 RX ADMIN — ATORVASTATIN CALCIUM 20 MILLIGRAM(S): 80 TABLET, FILM COATED ORAL at 22:10

## 2018-09-01 RX ADMIN — Medication 100 MILLIGRAM(S): at 14:10

## 2018-09-01 RX ADMIN — Medication 100 MILLIGRAM(S): at 05:34

## 2018-09-01 NOTE — PROGRESS NOTE ADULT - ASSESSMENT
1. Epidydimitis   - cont cipro   - f/u cbc   - no acute  intervention
Right Scrotal cellulitis/epidydimitis/orchitis     - COntinue IV clinda & cipro   - WBC unchanged, afebrile - f/u cbc in am   - will continue to follow
Right Scrotal cellulitis/epidydimitis/orchitis     - COntinue IV clinda & cipro   - f/u cbc in am   - will continue to follow   - scrotal elevation   - c/w IV abx
Right Scrotal cellulitis/epidydimitis/orchitis    right inguinal hernia      - COntinue IV clinda & cipro   - f/u cbc in am   - will continue to follow   - scrotal elevation   - out patient Surgery follow up for hernia
continue current antibiotic  discharge in AM
still c/o scrotal pain have cellulitis w small hernia    plan-----------------  continue IV antibiotic

## 2018-09-01 NOTE — PROGRESS NOTE ADULT - SUBJECTIVE AND OBJECTIVE BOX
NELSON CROWE  62y  Male    Patient is a 62y old  Male who presents with a chief complaint of testicular pain (29 Aug 2018 02:57)    ALLERGIES:  penicillins (Rash)      INTERVAL HPI/OVERNIGHT EVENTS:    VITALS:  T(F): 97.2 (09-01-18 @ 05:53), Max: 97.6 (08-31-18 @ 22:19)  HR: 57 (09-01-18 @ 05:53) (55 - 63)  BP: 145/68 (09-01-18 @ 05:53) (116/56 - 145/68)  RR: 18 (09-01-18 @ 05:53) (16 - 18)  SpO2: --    LABS:  09-01    138  |  92<L>  |  19  ----------------------------<  105<H>  3.3<L>   |  32  |  1.1    Ca    8.9      01 Sep 2018 11:27    TPro  6.4  /  Alb  3.8  /  TBili  1.0  /  DBili  x   /  AST  16  /  ALT  16  /  AlkPhos  85  08-30    MICROBIOLOGY:    MEDICATION:  acetaminophen   Tablet. 650 milliGRAM(s) Oral every 6 hours PRN  allopurinol 100 milliGRAM(s) Oral daily  atorvastatin 20 milliGRAM(s) Oral at bedtime  ciprofloxacin   IVPB 400 milliGRAM(s) IV Intermittent every 12 hours  clindamycin IVPB 600 milliGRAM(s) IV Intermittent every 8 hours  docusate sodium 100 milliGRAM(s) Oral two times a day  heparin  Injectable 5000 Unit(s) SubCutaneous every 8 hours  hydrochlorothiazide 25 milliGRAM(s) Oral daily  ibuprofen  Tablet 400 milliGRAM(s) Oral every 8 hours  losartan 100 milliGRAM(s) Oral daily  magnesium hydroxide Suspension 30 milliLiter(s) Oral daily PRN  morphine  - Injectable 2 milliGRAM(s) IV Push every 4 hours PRN  morphine  - Injectable 6 milliGRAM(s) IV Push every 4 hours PRN  polyethylene glycol 3350 17 Gram(s) Oral daily    RADIOLOGY & ADDITIONAL TESTS:

## 2018-09-01 NOTE — PROGRESS NOTE ADULT - SUBJECTIVE AND OBJECTIVE BOX
S; Scrotal discomfort continues to improve  O; Vital Signs Last 24 Hrs  T(C): 36.2 (01 Sep 2018 05:53), Max: 36.4 (31 Aug 2018 22:19)  T(F): 97.2 (01 Sep 2018 05:53), Max: 97.6 (31 Aug 2018 22:19)  HR: 57 (01 Sep 2018 05:53) (55 - 63)  BP: 145/68 (01 Sep 2018 05:53) (116/56 - 145/68)  BP(mean): --  RR: 18 (01 Sep 2018 05:53) (16 - 18)    MEDICATIONS  (STANDING):  allopurinol 100 milliGRAM(s) Oral daily  atorvastatin 20 milliGRAM(s) Oral at bedtime  ciprofloxacin   IVPB 400 milliGRAM(s) IV Intermittent every 12 hours  clindamycin IVPB 600 milliGRAM(s) IV Intermittent every 8 hours  docusate sodium 100 milliGRAM(s) Oral two times a day  heparin  Injectable 5000 Unit(s) SubCutaneous every 8 hours  hydrochlorothiazide 25 milliGRAM(s) Oral daily  ibuprofen  Tablet 400 milliGRAM(s) Oral every 8 hours  losartan 100 milliGRAM(s) Oral daily  polyethylene glycol 3350 17 Gram(s) Oral daily    MEDICATIONS  (PRN):  acetaminophen   Tablet. 650 milliGRAM(s) Oral every 6 hours PRN mild pain or temp > 101  magnesium hydroxide Suspension 30 milliLiter(s) Oral daily PRN Constipation  morphine  - Injectable 2 milliGRAM(s) IV Push every 4 hours PRN Mild Pain (1 - 3)  morphine  - Injectable 6 milliGRAM(s) IV Push every 4 hours PRN Moderate Pain (4 - 6)      EXAM:    : improved scrotal erythema, edema, tenderness; (+) RIH    labs: pending    Urine culture: not done

## 2018-09-02 ENCOUNTER — TRANSCRIPTION ENCOUNTER (OUTPATIENT)
Age: 63
End: 2018-09-02

## 2018-09-02 VITALS
TEMPERATURE: 97 F | SYSTOLIC BLOOD PRESSURE: 122 MMHG | RESPIRATION RATE: 16 BRPM | DIASTOLIC BLOOD PRESSURE: 57 MMHG | HEART RATE: 60 BPM

## 2018-09-02 LAB
ANION GAP SERPL CALC-SCNC: 13 MMOL/L — SIGNIFICANT CHANGE UP (ref 7–14)
BUN SERPL-MCNC: 22 MG/DL — HIGH (ref 10–20)
CALCIUM SERPL-MCNC: 8.6 MG/DL — SIGNIFICANT CHANGE UP (ref 8.5–10.1)
CHLORIDE SERPL-SCNC: 94 MMOL/L — LOW (ref 98–110)
CO2 SERPL-SCNC: 33 MMOL/L — HIGH (ref 17–32)
CREAT SERPL-MCNC: 1.1 MG/DL — SIGNIFICANT CHANGE UP (ref 0.7–1.5)
GLUCOSE SERPL-MCNC: 84 MG/DL — SIGNIFICANT CHANGE UP (ref 70–99)
HCT VFR BLD CALC: 42.6 % — SIGNIFICANT CHANGE UP (ref 42–52)
HGB BLD-MCNC: 14.6 G/DL — SIGNIFICANT CHANGE UP (ref 14–18)
MCHC RBC-ENTMCNC: 30.7 PG — SIGNIFICANT CHANGE UP (ref 27–31)
MCHC RBC-ENTMCNC: 34.3 G/DL — SIGNIFICANT CHANGE UP (ref 32–37)
MCV RBC AUTO: 89.7 FL — SIGNIFICANT CHANGE UP (ref 80–94)
NRBC # BLD: 0 /100 WBCS — SIGNIFICANT CHANGE UP (ref 0–0)
PLATELET # BLD AUTO: 181 K/UL — SIGNIFICANT CHANGE UP (ref 130–400)
POTASSIUM SERPL-MCNC: 3.7 MMOL/L — SIGNIFICANT CHANGE UP (ref 3.5–5)
POTASSIUM SERPL-SCNC: 3.7 MMOL/L — SIGNIFICANT CHANGE UP (ref 3.5–5)
RBC # BLD: 4.75 M/UL — SIGNIFICANT CHANGE UP (ref 4.7–6.1)
RBC # FLD: 11.6 % — SIGNIFICANT CHANGE UP (ref 11.5–14.5)
SODIUM SERPL-SCNC: 140 MMOL/L — SIGNIFICANT CHANGE UP (ref 135–146)
WBC # BLD: 12.4 K/UL — HIGH (ref 4.8–10.8)
WBC # FLD AUTO: 12.4 K/UL — HIGH (ref 4.8–10.8)

## 2018-09-02 RX ORDER — MOXIFLOXACIN HYDROCHLORIDE TABLETS, 400 MG 400 MG/1
1 TABLET, FILM COATED ORAL
Qty: 20 | Refills: 0 | OUTPATIENT
Start: 2018-09-02 | End: 2018-09-11

## 2018-09-02 RX ORDER — ALLOPURINOL 300 MG
1 TABLET ORAL
Qty: 0 | Refills: 0 | COMMUNITY
Start: 2018-09-02

## 2018-09-02 RX ORDER — BEVACIZUMAB 400 MG/16ML
0 INJECTION, SOLUTION INTRAVENOUS
Qty: 0 | Refills: 0 | COMMUNITY

## 2018-09-02 RX ORDER — ATORVASTATIN CALCIUM 80 MG/1
1 TABLET, FILM COATED ORAL
Qty: 0 | Refills: 0 | DISCHARGE
Start: 2018-09-02

## 2018-09-02 RX ORDER — ALLOPURINOL 300 MG
0 TABLET ORAL
Qty: 0 | Refills: 0 | COMMUNITY

## 2018-09-02 RX ORDER — ATORVASTATIN CALCIUM 80 MG/1
0 TABLET, FILM COATED ORAL
Qty: 0 | Refills: 0 | COMMUNITY

## 2018-09-02 RX ORDER — LOSARTAN POTASSIUM 100 MG/1
0 TABLET, FILM COATED ORAL
Qty: 0 | Refills: 0 | COMMUNITY

## 2018-09-02 RX ORDER — IBUPROFEN 200 MG
2 TABLET ORAL
Qty: 180 | Refills: 0 | OUTPATIENT
Start: 2018-09-02 | End: 2018-10-01

## 2018-09-02 RX ORDER — LOSARTAN POTASSIUM 100 MG/1
1 TABLET, FILM COATED ORAL
Qty: 0 | Refills: 0 | DISCHARGE
Start: 2018-09-02

## 2018-09-02 RX ORDER — IBUPROFEN 200 MG
1 TABLET ORAL
Qty: 0 | Refills: 0 | COMMUNITY
Start: 2018-09-02

## 2018-09-02 RX ADMIN — Medication 100 MILLIGRAM(S): at 13:31

## 2018-09-02 RX ADMIN — Medication 200 MILLIGRAM(S): at 06:08

## 2018-09-02 RX ADMIN — Medication 400 MILLIGRAM(S): at 06:37

## 2018-09-02 RX ADMIN — Medication 400 MILLIGRAM(S): at 13:31

## 2018-09-02 RX ADMIN — Medication 400 MILLIGRAM(S): at 14:21

## 2018-09-02 RX ADMIN — Medication 25 MILLIGRAM(S): at 06:08

## 2018-09-02 RX ADMIN — Medication 100 MILLIGRAM(S): at 09:59

## 2018-09-02 RX ADMIN — Medication 100 MILLIGRAM(S): at 06:08

## 2018-09-02 RX ADMIN — LOSARTAN POTASSIUM 100 MILLIGRAM(S): 100 TABLET, FILM COATED ORAL at 06:08

## 2018-09-02 RX ADMIN — Medication 400 MILLIGRAM(S): at 06:08

## 2018-09-02 NOTE — DISCHARGE NOTE ADULT - CARE PROVIDER_API CALL
Ryan Herrera (DO), Infectious Disease; Internal Medicine  [address] [place] [address]  Phone: [phone]  Fax: [phone]

## 2018-09-02 NOTE — DISCHARGE NOTE ADULT - HOSPITAL COURSE
History of Present Illness:  Reason for Admission: testicular pain	  History of Present Illness: 	  pt is a 63 yo m h/o hypercholesterolemia, gout, htn presents with R sided testicular pain and swelling for 1 day. Also c/o fever, took Motrin for the pain with minimum relief.  pt denies  abdominal pain, chest pain, sob, cough, dysuria, pyuria, N/V/D.   was admiited w cellulitis of right scrotal     not improved --advised to see Urology and surgery as out pt

## 2018-09-02 NOTE — DISCHARGE NOTE ADULT - PATIENT PORTAL LINK FT
You can access the [email or web address] Patient Portal, offered by [facility], by registering with the following website: [email or web address]

## 2018-09-02 NOTE — DISCHARGE NOTE ADULT - COMPLETE THE FOLLOWING IF THE PATIENT REFUSES THE INFLUENZA VACCINE:
Risks/benefits discussed with patient/surrogate Vaccine Information Sheet (VIS) provided-VIS date: 8/07/15/Risks/benefits discussed with patient/surrogate

## 2018-09-02 NOTE — DISCHARGE NOTE ADULT - CARE PLAN
Principal Discharge DX:	Scrotal wall abscess  Goal:	improving slowly  Assessment and plan of treatment:	clindamycin and cipro

## 2018-09-02 NOTE — DISCHARGE NOTE ADULT - MEDICATION SUMMARY - MEDICATIONS TO TAKE
I will START or STAY ON the medications listed below when I get home from the hospital:    ibuprofen 400 mg oral tablet  -- 1 tab(s) by mouth every 8 hours  -- Indication: For pain    losartan 100 mg oral tablet  -- 1 tab(s) by mouth once a day  -- Indication: For Hypertension    allopurinol 100 mg oral tablet  -- 1 tab(s) by mouth once a day  -- Indication: For Gout    atorvastatin 20 mg oral tablet  -- 1 tab(s) by mouth once a day (at bedtime)  -- Indication: For cholesterol    testosterone  -- Indication: For replacement

## 2018-09-05 PROBLEM — I10 ESSENTIAL (PRIMARY) HYPERTENSION: Chronic | Status: ACTIVE | Noted: 2018-08-28

## 2018-09-05 PROBLEM — M10.9 GOUT, UNSPECIFIED: Chronic | Status: ACTIVE | Noted: 2018-08-28

## 2018-09-05 PROBLEM — E78.5 HYPERLIPIDEMIA, UNSPECIFIED: Chronic | Status: ACTIVE | Noted: 2018-08-28

## 2018-09-07 DIAGNOSIS — M10.9 GOUT, UNSPECIFIED: ICD-10-CM

## 2018-09-07 DIAGNOSIS — N49.2 INFLAMMATORY DISORDERS OF SCROTUM: ICD-10-CM

## 2018-09-07 DIAGNOSIS — E78.00 PURE HYPERCHOLESTEROLEMIA, UNSPECIFIED: ICD-10-CM

## 2018-09-07 DIAGNOSIS — Z28.21 IMMUNIZATION NOT CARRIED OUT BECAUSE OF PATIENT REFUSAL: ICD-10-CM

## 2018-09-07 DIAGNOSIS — I10 ESSENTIAL (PRIMARY) HYPERTENSION: ICD-10-CM

## 2018-09-07 DIAGNOSIS — Z88.0 ALLERGY STATUS TO PENICILLIN: ICD-10-CM

## 2018-09-07 DIAGNOSIS — N50.819 TESTICULAR PAIN, UNSPECIFIED: ICD-10-CM

## 2018-09-07 DIAGNOSIS — K40.90 UNILATERAL INGUINAL HERNIA, WITHOUT OBSTRUCTION OR GANGRENE, NOT SPECIFIED AS RECURRENT: ICD-10-CM

## 2018-09-07 DIAGNOSIS — K59.00 CONSTIPATION, UNSPECIFIED: ICD-10-CM

## 2018-09-11 ENCOUNTER — APPOINTMENT (OUTPATIENT)
Dept: SURGERY | Facility: CLINIC | Age: 63
End: 2018-09-11
Payer: COMMERCIAL

## 2018-09-11 VITALS
WEIGHT: 245 LBS | BODY MASS INDEX: 36.29 KG/M2 | DIASTOLIC BLOOD PRESSURE: 78 MMHG | HEIGHT: 69 IN | SYSTOLIC BLOOD PRESSURE: 144 MMHG

## 2018-09-11 DIAGNOSIS — K40.91 UNILATERAL INGUINAL HERNIA, W/OUT OBSTRUCTION OR GANGRENE, RECURRENT: ICD-10-CM

## 2018-09-11 DIAGNOSIS — Z81.4 FAMILY HISTORY OF OTHER SUBSTANCE ABUSE AND DEPENDENCE: ICD-10-CM

## 2018-09-11 PROCEDURE — 99243 OFF/OP CNSLTJ NEW/EST LOW 30: CPT

## 2018-09-14 ENCOUNTER — EMERGENCY (EMERGENCY)
Facility: HOSPITAL | Age: 63
LOS: 0 days | Discharge: HOME | End: 2018-09-14
Attending: EMERGENCY MEDICINE | Admitting: EMERGENCY MEDICINE

## 2018-09-14 VITALS
HEART RATE: 70 BPM | HEIGHT: 69 IN | TEMPERATURE: 98 F | WEIGHT: 244.93 LBS | DIASTOLIC BLOOD PRESSURE: 81 MMHG | SYSTOLIC BLOOD PRESSURE: 151 MMHG | RESPIRATION RATE: 16 BRPM | OXYGEN SATURATION: 99 %

## 2018-09-14 DIAGNOSIS — I10 ESSENTIAL (PRIMARY) HYPERTENSION: ICD-10-CM

## 2018-09-14 DIAGNOSIS — Z88.0 ALLERGY STATUS TO PENICILLIN: ICD-10-CM

## 2018-09-14 DIAGNOSIS — Z79.899 OTHER LONG TERM (CURRENT) DRUG THERAPY: ICD-10-CM

## 2018-09-14 DIAGNOSIS — E78.5 HYPERLIPIDEMIA, UNSPECIFIED: ICD-10-CM

## 2018-09-14 DIAGNOSIS — N43.3 HYDROCELE, UNSPECIFIED: ICD-10-CM

## 2018-09-14 DIAGNOSIS — Z98.890 OTHER SPECIFIED POSTPROCEDURAL STATES: ICD-10-CM

## 2018-09-14 DIAGNOSIS — Z79.1 LONG TERM (CURRENT) USE OF NON-STEROIDAL ANTI-INFLAMMATORIES (NSAID): ICD-10-CM

## 2018-09-14 DIAGNOSIS — Z98.890 OTHER SPECIFIED POSTPROCEDURAL STATES: Chronic | ICD-10-CM

## 2018-09-14 DIAGNOSIS — N50.89 OTHER SPECIFIED DISORDERS OF THE MALE GENITAL ORGANS: ICD-10-CM

## 2018-09-14 LAB
ALBUMIN SERPL ELPH-MCNC: 4.1 G/DL — SIGNIFICANT CHANGE UP (ref 3.5–5.2)
ALP SERPL-CCNC: 67 U/L — SIGNIFICANT CHANGE UP (ref 30–115)
ALT FLD-CCNC: 15 U/L — SIGNIFICANT CHANGE UP (ref 0–41)
ANION GAP SERPL CALC-SCNC: 10 MMOL/L — SIGNIFICANT CHANGE UP (ref 7–14)
APPEARANCE UR: CLEAR — SIGNIFICANT CHANGE UP
AST SERPL-CCNC: 17 U/L — SIGNIFICANT CHANGE UP (ref 0–41)
BACTERIA # UR AUTO: ABNORMAL
BASOPHILS # BLD AUTO: 0.08 K/UL — SIGNIFICANT CHANGE UP (ref 0–0.2)
BASOPHILS NFR BLD AUTO: 0.7 % — SIGNIFICANT CHANGE UP (ref 0–1)
BILIRUB SERPL-MCNC: 0.6 MG/DL — SIGNIFICANT CHANGE UP (ref 0.2–1.2)
BILIRUB UR-MCNC: NEGATIVE — SIGNIFICANT CHANGE UP
BUN SERPL-MCNC: 22 MG/DL — HIGH (ref 10–20)
CALCIUM SERPL-MCNC: 9.8 MG/DL — SIGNIFICANT CHANGE UP (ref 8.5–10.1)
CHLORIDE SERPL-SCNC: 103 MMOL/L — SIGNIFICANT CHANGE UP (ref 98–110)
CO2 SERPL-SCNC: 30 MMOL/L — SIGNIFICANT CHANGE UP (ref 17–32)
COLOR SPEC: YELLOW — SIGNIFICANT CHANGE UP
CREAT SERPL-MCNC: 1 MG/DL — SIGNIFICANT CHANGE UP (ref 0.7–1.5)
DIFF PNL FLD: ABNORMAL
EOSINOPHIL # BLD AUTO: 0.54 K/UL — SIGNIFICANT CHANGE UP (ref 0–0.7)
EOSINOPHIL NFR BLD AUTO: 4.8 % — SIGNIFICANT CHANGE UP (ref 0–8)
EPI CELLS # UR: ABNORMAL /HPF
GLUCOSE SERPL-MCNC: 82 MG/DL — SIGNIFICANT CHANGE UP (ref 70–99)
GLUCOSE UR QL: NEGATIVE MG/DL — SIGNIFICANT CHANGE UP
HCT VFR BLD CALC: 39.7 % — LOW (ref 42–52)
HGB BLD-MCNC: 13.4 G/DL — LOW (ref 14–18)
IMM GRANULOCYTES NFR BLD AUTO: 0.4 % — HIGH (ref 0.1–0.3)
KETONES UR-MCNC: NEGATIVE — SIGNIFICANT CHANGE UP
LEUKOCYTE ESTERASE UR-ACNC: ABNORMAL
LYMPHOCYTES # BLD AUTO: 1.43 K/UL — SIGNIFICANT CHANGE UP (ref 1.2–3.4)
LYMPHOCYTES # BLD AUTO: 12.8 % — LOW (ref 20.5–51.1)
MCHC RBC-ENTMCNC: 30.9 PG — SIGNIFICANT CHANGE UP (ref 27–31)
MCHC RBC-ENTMCNC: 33.8 G/DL — SIGNIFICANT CHANGE UP (ref 32–37)
MCV RBC AUTO: 91.5 FL — SIGNIFICANT CHANGE UP (ref 80–94)
MONOCYTES # BLD AUTO: 1.19 K/UL — HIGH (ref 0.1–0.6)
MONOCYTES NFR BLD AUTO: 10.7 % — HIGH (ref 1.7–9.3)
NEUTROPHILS # BLD AUTO: 7.86 K/UL — HIGH (ref 1.4–6.5)
NEUTROPHILS NFR BLD AUTO: 70.6 % — SIGNIFICANT CHANGE UP (ref 42.2–75.2)
NITRITE UR-MCNC: NEGATIVE — SIGNIFICANT CHANGE UP
NRBC # BLD: 0 /100 WBCS — SIGNIFICANT CHANGE UP (ref 0–0)
PH UR: 5.5 — SIGNIFICANT CHANGE UP (ref 5–8)
PLATELET # BLD AUTO: 306 K/UL — SIGNIFICANT CHANGE UP (ref 130–400)
POTASSIUM SERPL-MCNC: 4.1 MMOL/L — SIGNIFICANT CHANGE UP (ref 3.5–5)
POTASSIUM SERPL-SCNC: 4.1 MMOL/L — SIGNIFICANT CHANGE UP (ref 3.5–5)
PROT SERPL-MCNC: 7 G/DL — SIGNIFICANT CHANGE UP (ref 6–8)
PROT UR-MCNC: NEGATIVE MG/DL — SIGNIFICANT CHANGE UP
RBC # BLD: 4.34 M/UL — LOW (ref 4.7–6.1)
RBC # FLD: 11.9 % — SIGNIFICANT CHANGE UP (ref 11.5–14.5)
RBC CASTS # UR COMP ASSIST: SIGNIFICANT CHANGE UP /HPF
SODIUM SERPL-SCNC: 143 MMOL/L — SIGNIFICANT CHANGE UP (ref 135–146)
SP GR SPEC: 1.02 — SIGNIFICANT CHANGE UP (ref 1.01–1.03)
UROBILINOGEN FLD QL: 0.2 MG/DL — SIGNIFICANT CHANGE UP (ref 0.2–0.2)
WBC # BLD: 11.15 K/UL — HIGH (ref 4.8–10.8)
WBC # FLD AUTO: 11.15 K/UL — HIGH (ref 4.8–10.8)
WBC UR QL: SIGNIFICANT CHANGE UP /HPF

## 2018-09-14 RX ORDER — OXYCODONE AND ACETAMINOPHEN 5; 325 MG/1; MG/1
1 TABLET ORAL ONCE
Qty: 0 | Refills: 0 | Status: DISCONTINUED | OUTPATIENT
Start: 2018-09-14 | End: 2018-09-14

## 2018-09-14 RX ORDER — OXYCODONE AND ACETAMINOPHEN 5; 325 MG/1; MG/1
1 TABLET ORAL
Qty: 16 | Refills: 0
Start: 2018-09-14 | End: 2018-09-17

## 2018-09-14 RX ADMIN — OXYCODONE AND ACETAMINOPHEN 1 TABLET(S): 5; 325 TABLET ORAL at 19:59

## 2018-09-14 NOTE — ED PROVIDER NOTE - OBJECTIVE STATEMENT
61 y/o male c/o right scrotal pain and swelling worsening x 1 week. patient with hx of hydrocele. patient with recent hospital admission and finished antibx yesterday. patient denies any dysuria, fever, back pain. patient seen by urologist and general surgeon.

## 2018-09-14 NOTE — ED PROVIDER NOTE - PROGRESS NOTE DETAILS
patient seen in the ED by surgical team lisa richardson. dr. acosta and dr. aguilar consulted. patient to follow up with dr. holland

## 2018-09-14 NOTE — ED PROVIDER NOTE - MEDICAL DECISION MAKING DETAILS
results reviewed.  Urology/surgery consulted.  Plan is for patientto follow up with Dr Stark as outpatient.  no abx at this time

## 2018-09-14 NOTE — ED ADULT NURSE NOTE - NSIMPLEMENTINTERV_GEN_ALL_ED
Implemented All Universal Safety Interventions:  [place] to call system. Call bell, personal items and telephone within reach. Instruct patient to call for assistance. Room bathroom lighting operational. Non-slip footwear when patient is off stretcher. Physically safe environment: no spills, clutter or unnecessary equipment. Stretcher in lowest position, wheels locked, appropriate side rails in place.

## 2018-09-14 NOTE — ED PROVIDER NOTE - ATTENDING CONTRIBUTION TO CARE
63 yo M presents with c/o increasing scrotal swelling over the last week since d/c from hospital.  Pt was being treated for epididymitis with abx, completed course yesterday.  Sine d/c patient has followed up with surgeon for possible hernia and urology for hydrocele.  no fevers or chills, no n/v, no urinary complaints.  On exam pt in NAD AAO x 3, abd sosft nt nd, + swelling to right scrotom, feels firm, no overlying skin changes

## 2018-11-13 ENCOUNTER — APPOINTMENT (OUTPATIENT)
Dept: CARDIOLOGY | Facility: CLINIC | Age: 63
End: 2018-11-13

## 2018-12-11 ENCOUNTER — MEDICATION RENEWAL (OUTPATIENT)
Age: 63
End: 2018-12-11

## 2018-12-12 ENCOUNTER — APPOINTMENT (OUTPATIENT)
Dept: CARDIOLOGY | Facility: CLINIC | Age: 63
End: 2018-12-12

## 2018-12-12 VITALS — DIASTOLIC BLOOD PRESSURE: 80 MMHG | HEART RATE: 56 BPM | SYSTOLIC BLOOD PRESSURE: 130 MMHG | RESPIRATION RATE: 18 BRPM

## 2018-12-12 VITALS — HEIGHT: 69 IN | BODY MASS INDEX: 36.73 KG/M2 | WEIGHT: 248 LBS

## 2018-12-12 RX ORDER — CLOTRIMAZOLE AND BETAMETHASONE DIPROPIONATE 10; .5 MG/G; MG/G
1-0.05 CREAM TOPICAL
Qty: 45 | Refills: 0 | Status: DISCONTINUED | COMMUNITY
Start: 2017-08-23 | End: 2018-12-12

## 2018-12-12 RX ORDER — NYSTATIN 100000 [USP'U]/G
100000 POWDER TOPICAL
Qty: 60 | Refills: 0 | Status: DISCONTINUED | COMMUNITY
Start: 2017-08-23 | End: 2018-12-12

## 2018-12-12 NOTE — PHYSICAL EXAM
[General Appearance - Well Developed] : well developed [Normal Appearance] : normal appearance [Well Groomed] : well groomed [General Appearance - Well Nourished] : well nourished [No Deformities] : no deformities [General Appearance - In No Acute Distress] : no acute distress [Normal Conjunctiva] : the conjunctiva exhibited no abnormalities [Eyelids - No Xanthelasma] : the eyelids demonstrated no xanthelasmas [Normal Oral Mucosa] : normal oral mucosa [No Oral Pallor] : no oral pallor [No Oral Cyanosis] : no oral cyanosis [Normal Jugular Venous A Waves Present] : normal jugular venous A waves present [Normal Jugular Venous V Waves Present] : normal jugular venous V waves present [No Jugular Venous Hernandez A Waves] : no jugular venous hernandez A waves [Respiration, Rhythm And Depth] : normal respiratory rhythm and effort [Exaggerated Use Of Accessory Muscles For Inspiration] : no accessory muscle use [Auscultation Breath Sounds / Voice Sounds] : lungs were clear to auscultation bilaterally [Heart Rate And Rhythm] : heart rate and rhythm were normal [Heart Sounds] : normal S1 and S2 [Murmurs] : no murmurs present [Bowel Sounds] : normal bowel sounds [Abdomen Soft] : soft [Abdomen Tenderness] : non-tender [Abdomen Mass (___ Cm)] : no abdominal mass palpated [Abnormal Walk] : normal gait [Gait - Sufficient For Exercise Testing] : the gait was sufficient for exercise testing [Nail Clubbing] : no clubbing of the fingernails [Cyanosis, Localized] : no localized cyanosis [Petechial Hemorrhages (___cm)] : no petechial hemorrhages [Skin Color & Pigmentation] : normal skin color and pigmentation [] : no rash [No Venous Stasis] : no venous stasis [Skin Lesions] : no skin lesions [No Skin Ulcers] : no skin ulcer [No Xanthoma] : no  xanthoma was observed [Oriented To Time, Place, And Person] : oriented to person, place, and time

## 2018-12-16 NOTE — CONSULT NOTE ADULT - CONSULT REQUESTED DATE/TIME
29-Aug-2018 05:48 Airway patent, Nasal mucosa clear. Mouth with moist mucosa. Throat has no vesicles, no oropharyngeal exudates and uvula is midline. +denture

## 2019-03-18 ENCOUNTER — RX RENEWAL (OUTPATIENT)
Age: 64
End: 2019-03-18

## 2019-06-10 ENCOUNTER — RX RENEWAL (OUTPATIENT)
Age: 64
End: 2019-06-10

## 2019-06-14 ENCOUNTER — APPOINTMENT (OUTPATIENT)
Dept: CARDIOLOGY | Facility: CLINIC | Age: 64
End: 2019-06-14

## 2019-09-06 ENCOUNTER — CHART COPY (OUTPATIENT)
Age: 64
End: 2019-09-06

## 2019-09-06 ENCOUNTER — APPOINTMENT (OUTPATIENT)
Dept: CARDIOLOGY | Facility: CLINIC | Age: 64
End: 2019-09-06
Payer: COMMERCIAL

## 2019-09-06 VITALS — RESPIRATION RATE: 18 BRPM | HEART RATE: 60 BPM

## 2019-09-06 VITALS — SYSTOLIC BLOOD PRESSURE: 120 MMHG | DIASTOLIC BLOOD PRESSURE: 80 MMHG

## 2019-09-06 PROCEDURE — 93000 ELECTROCARDIOGRAM COMPLETE: CPT

## 2019-09-06 PROCEDURE — 99214 OFFICE O/P EST MOD 30 MIN: CPT

## 2019-09-06 NOTE — PHYSICAL EXAM
[General Appearance - Well Developed] : well developed [Normal Appearance] : normal appearance [Well Groomed] : well groomed [No Deformities] : no deformities [General Appearance - Well Nourished] : well nourished [General Appearance - In No Acute Distress] : no acute distress [Normal Conjunctiva] : the conjunctiva exhibited no abnormalities [Eyelids - No Xanthelasma] : the eyelids demonstrated no xanthelasmas [Normal Oral Mucosa] : normal oral mucosa [No Oral Pallor] : no oral pallor [No Oral Cyanosis] : no oral cyanosis [Normal Jugular Venous A Waves Present] : normal jugular venous A waves present [Normal Jugular Venous V Waves Present] : normal jugular venous V waves present [No Jugular Venous Hernandez A Waves] : no jugular venous hernandez A waves [Heart Rate And Rhythm] : heart rate and rhythm were normal [Heart Sounds] : normal S1 and S2 [Murmurs] : no murmurs present [Respiration, Rhythm And Depth] : normal respiratory rhythm and effort [Exaggerated Use Of Accessory Muscles For Inspiration] : no accessory muscle use [Auscultation Breath Sounds / Voice Sounds] : lungs were clear to auscultation bilaterally [Bowel Sounds] : normal bowel sounds [Abdomen Soft] : soft [Abdomen Tenderness] : non-tender [Abdomen Mass (___ Cm)] : no abdominal mass palpated [Abnormal Walk] : normal gait [Gait - Sufficient For Exercise Testing] : the gait was sufficient for exercise testing [Nail Clubbing] : no clubbing of the fingernails [Cyanosis, Localized] : no localized cyanosis [Petechial Hemorrhages (___cm)] : no petechial hemorrhages [Skin Color & Pigmentation] : normal skin color and pigmentation [] : no rash [No Venous Stasis] : no venous stasis [Skin Lesions] : no skin lesions [No Skin Ulcers] : no skin ulcer [No Xanthoma] : no  xanthoma was observed [Oriented To Time, Place, And Person] : oriented to person, place, and time

## 2019-09-09 ENCOUNTER — RX RENEWAL (OUTPATIENT)
Age: 64
End: 2019-09-09

## 2019-09-30 NOTE — H&P ADULT - PROBLEM/PLAN-1
Patient called wanting to talk to the manager. He said that Bernie Javier had denied his insulin and that this was not okay. I advised patient that it was originally denied because he did not have a follow up and hadn't been seen in 3 months. Once patient made an appointment, Bernie Javier had sent this script in to his pharmacy. Patient was furious about this because she withheld his insulin and \"doctors cannot do this. \" I advised him that we were not withholding his insulin and that diabetics typically have to be seen every 3 months for refills. Because the refill was sent a day late, the pharmacy couldn't fill it for another 4 days. He didn't understand why this was the case and wanted to know why he had to be seen every 3 months and \"who made this decision? \" He would like to talk to the manager.
DISPLAY PLAN FREE TEXT

## 2019-10-29 ENCOUNTER — APPOINTMENT (OUTPATIENT)
Dept: CARDIOLOGY | Facility: CLINIC | Age: 64
End: 2019-10-29

## 2020-03-11 ENCOUNTER — APPOINTMENT (OUTPATIENT)
Dept: CARDIOLOGY | Facility: CLINIC | Age: 65
End: 2020-03-11
Payer: COMMERCIAL

## 2020-03-11 ENCOUNTER — APPOINTMENT (OUTPATIENT)
Dept: CARDIOLOGY | Facility: CLINIC | Age: 65
End: 2020-03-11

## 2020-03-11 VITALS — HEIGHT: 69 IN | BODY MASS INDEX: 37.62 KG/M2 | WEIGHT: 254 LBS

## 2020-03-11 VITALS — HEART RATE: 56 BPM | DIASTOLIC BLOOD PRESSURE: 80 MMHG | SYSTOLIC BLOOD PRESSURE: 118 MMHG | RESPIRATION RATE: 18 BRPM

## 2020-03-11 PROCEDURE — 99214 OFFICE O/P EST MOD 30 MIN: CPT

## 2020-03-11 PROCEDURE — 93000 ELECTROCARDIOGRAM COMPLETE: CPT

## 2020-03-11 NOTE — PHYSICAL EXAM
[General Appearance - Well Developed] : well developed [Normal Appearance] : normal appearance [Well Groomed] : well groomed [General Appearance - Well Nourished] : well nourished [No Deformities] : no deformities [General Appearance - In No Acute Distress] : no acute distress [Normal Conjunctiva] : the conjunctiva exhibited no abnormalities [Eyelids - No Xanthelasma] : the eyelids demonstrated no xanthelasmas [Normal Oral Mucosa] : normal oral mucosa [No Oral Pallor] : no oral pallor [No Oral Cyanosis] : no oral cyanosis [Normal Jugular Venous A Waves Present] : normal jugular venous A waves present [Normal Jugular Venous V Waves Present] : normal jugular venous V waves present [No Jugular Venous Hernandez A Waves] : no jugular venous hernandez A waves [Heart Rate And Rhythm] : heart rate and rhythm were normal [Heart Sounds] : normal S1 and S2 [Murmurs] : no murmurs present [Respiration, Rhythm And Depth] : normal respiratory rhythm and effort [Exaggerated Use Of Accessory Muscles For Inspiration] : no accessory muscle use [Auscultation Breath Sounds / Voice Sounds] : lungs were clear to auscultation bilaterally [Bowel Sounds] : normal bowel sounds [Abdomen Soft] : soft [Abdomen Tenderness] : non-tender [Abdomen Mass (___ Cm)] : no abdominal mass palpated [Abnormal Walk] : normal gait [Gait - Sufficient For Exercise Testing] : the gait was sufficient for exercise testing [Nail Clubbing] : no clubbing of the fingernails [Cyanosis, Localized] : no localized cyanosis [Petechial Hemorrhages (___cm)] : no petechial hemorrhages [Skin Color & Pigmentation] : normal skin color and pigmentation [] : no rash [No Venous Stasis] : no venous stasis [Skin Lesions] : no skin lesions [No Skin Ulcers] : no skin ulcer [No Xanthoma] : no  xanthoma was observed [Oriented To Time, Place, And Person] : oriented to person, place, and time

## 2020-08-03 ENCOUNTER — APPOINTMENT (OUTPATIENT)
Dept: CARDIOLOGY | Facility: CLINIC | Age: 65
End: 2020-08-03

## 2020-09-30 ENCOUNTER — APPOINTMENT (OUTPATIENT)
Dept: CARDIOLOGY | Facility: CLINIC | Age: 65
End: 2020-09-30
Payer: COMMERCIAL

## 2020-09-30 VITALS — WEIGHT: 268 LBS | TEMPERATURE: 96.5 F | HEIGHT: 69 IN | BODY MASS INDEX: 39.69 KG/M2

## 2020-09-30 VITALS — SYSTOLIC BLOOD PRESSURE: 120 MMHG | HEART RATE: 58 BPM | DIASTOLIC BLOOD PRESSURE: 76 MMHG | RESPIRATION RATE: 18 BRPM

## 2020-09-30 PROCEDURE — 93000 ELECTROCARDIOGRAM COMPLETE: CPT

## 2020-09-30 PROCEDURE — 99214 OFFICE O/P EST MOD 30 MIN: CPT

## 2020-09-30 NOTE — PHYSICAL EXAM
[General Appearance - Well Developed] : well developed [Normal Appearance] : normal appearance [Well Groomed] : well groomed [General Appearance - Well Nourished] : well nourished [No Deformities] : no deformities [Normal Conjunctiva] : the conjunctiva exhibited no abnormalities [General Appearance - In No Acute Distress] : no acute distress [Eyelids - No Xanthelasma] : the eyelids demonstrated no xanthelasmas [Normal Oral Mucosa] : normal oral mucosa [No Oral Pallor] : no oral pallor [No Oral Cyanosis] : no oral cyanosis [Normal Jugular Venous A Waves Present] : normal jugular venous A waves present [Normal Jugular Venous V Waves Present] : normal jugular venous V waves present [No Jugular Venous Hernandez A Waves] : no jugular venous hernandez A waves [Heart Rate And Rhythm] : heart rate and rhythm were normal [Heart Sounds] : normal S1 and S2 [Murmurs] : no murmurs present [Respiration, Rhythm And Depth] : normal respiratory rhythm and effort [Exaggerated Use Of Accessory Muscles For Inspiration] : no accessory muscle use [Bowel Sounds] : normal bowel sounds [Auscultation Breath Sounds / Voice Sounds] : lungs were clear to auscultation bilaterally [Abdomen Soft] : soft [Abdomen Tenderness] : non-tender [Abdomen Mass (___ Cm)] : no abdominal mass palpated [Abnormal Walk] : normal gait [Gait - Sufficient For Exercise Testing] : the gait was sufficient for exercise testing [Nail Clubbing] : no clubbing of the fingernails [Petechial Hemorrhages (___cm)] : no petechial hemorrhages [Cyanosis, Localized] : no localized cyanosis [Skin Color & Pigmentation] : normal skin color and pigmentation [] : no rash [Skin Lesions] : no skin lesions [No Skin Ulcers] : no skin ulcer [No Venous Stasis] : no venous stasis [No Xanthoma] : no  xanthoma was observed [Oriented To Time, Place, And Person] : oriented to person, place, and time

## 2021-01-14 ENCOUNTER — APPOINTMENT (OUTPATIENT)
Dept: CARDIOLOGY | Facility: CLINIC | Age: 66
End: 2021-01-14
Payer: COMMERCIAL

## 2021-01-14 PROCEDURE — 99072 ADDL SUPL MATRL&STAF TM PHE: CPT

## 2021-01-14 PROCEDURE — 93306 TTE W/DOPPLER COMPLETE: CPT

## 2021-01-27 ENCOUNTER — APPOINTMENT (OUTPATIENT)
Dept: CARDIOLOGY | Facility: CLINIC | Age: 66
End: 2021-01-27
Payer: COMMERCIAL

## 2021-01-27 VITALS — SYSTOLIC BLOOD PRESSURE: 126 MMHG | DIASTOLIC BLOOD PRESSURE: 80 MMHG | RESPIRATION RATE: 18 BRPM | HEART RATE: 72 BPM

## 2021-01-27 VITALS — BODY MASS INDEX: 39.99 KG/M2 | HEIGHT: 69 IN | WEIGHT: 270 LBS | TEMPERATURE: 97.3 F

## 2021-01-27 DIAGNOSIS — N40.0 BENIGN PROSTATIC HYPERPLASIA WITHOUT LOWER URINARY TRACT SYMPMS: ICD-10-CM

## 2021-01-27 PROCEDURE — 99072 ADDL SUPL MATRL&STAF TM PHE: CPT

## 2021-01-27 PROCEDURE — 99214 OFFICE O/P EST MOD 30 MIN: CPT

## 2021-01-27 PROCEDURE — 93000 ELECTROCARDIOGRAM COMPLETE: CPT

## 2021-01-27 RX ORDER — TAMSULOSIN HYDROCHLORIDE 0.4 MG/1
0.4 CAPSULE ORAL
Refills: 0 | Status: ACTIVE | COMMUNITY

## 2021-01-27 NOTE — PHYSICAL EXAM
[General Appearance - Well Developed] : well developed [Normal Appearance] : normal appearance [Well Groomed] : well groomed [General Appearance - Well Nourished] : well nourished [No Deformities] : no deformities [General Appearance - In No Acute Distress] : no acute distress [Normal Conjunctiva] : the conjunctiva exhibited no abnormalities [Eyelids - No Xanthelasma] : the eyelids demonstrated no xanthelasmas [Normal Oral Mucosa] : normal oral mucosa [No Oral Pallor] : no oral pallor [No Oral Cyanosis] : no oral cyanosis [Normal Jugular Venous A Waves Present] : normal jugular venous A waves present [Normal Jugular Venous V Waves Present] : normal jugular venous V waves present [No Jugular Venous Hernandez A Waves] : no jugular venous hernandez A waves [Heart Rate And Rhythm] : heart rate and rhythm were normal [Heart Sounds] : normal S1 and S2 [Murmurs] : no murmurs present [Exaggerated Use Of Accessory Muscles For Inspiration] : no accessory muscle use [Respiration, Rhythm And Depth] : normal respiratory rhythm and effort [Auscultation Breath Sounds / Voice Sounds] : lungs were clear to auscultation bilaterally [Bowel Sounds] : normal bowel sounds [Abdomen Soft] : soft [Abdomen Tenderness] : non-tender [Abdomen Mass (___ Cm)] : no abdominal mass palpated [Abnormal Walk] : normal gait [Gait - Sufficient For Exercise Testing] : the gait was sufficient for exercise testing [Nail Clubbing] : no clubbing of the fingernails [Cyanosis, Localized] : no localized cyanosis [Petechial Hemorrhages (___cm)] : no petechial hemorrhages [Skin Color & Pigmentation] : normal skin color and pigmentation [No Venous Stasis] : no venous stasis [] : no rash [Skin Lesions] : no skin lesions [No Skin Ulcers] : no skin ulcer [No Xanthoma] : no  xanthoma was observed [Oriented To Time, Place, And Person] : oriented to person, place, and time

## 2021-07-27 ENCOUNTER — APPOINTMENT (OUTPATIENT)
Dept: CARDIOLOGY | Facility: CLINIC | Age: 66
End: 2021-07-27
Payer: COMMERCIAL

## 2021-07-27 VITALS — BODY MASS INDEX: 38.51 KG/M2 | WEIGHT: 260 LBS | HEIGHT: 69 IN

## 2021-07-27 VITALS — HEART RATE: 60 BPM | RESPIRATION RATE: 18 BRPM | DIASTOLIC BLOOD PRESSURE: 70 MMHG | SYSTOLIC BLOOD PRESSURE: 130 MMHG

## 2021-07-27 DIAGNOSIS — M10.9 GOUT, UNSPECIFIED: ICD-10-CM

## 2021-07-27 PROCEDURE — 93000 ELECTROCARDIOGRAM COMPLETE: CPT

## 2021-07-27 PROCEDURE — 99214 OFFICE O/P EST MOD 30 MIN: CPT

## 2021-07-27 NOTE — PHYSICAL EXAM
[General Appearance - Well Developed] : well developed [Normal Appearance] : normal appearance [Well Groomed] : well groomed [General Appearance - Well Nourished] : well nourished [No Deformities] : no deformities [General Appearance - In No Acute Distress] : no acute distress [Normal Conjunctiva] : the conjunctiva exhibited no abnormalities [Eyelids - No Xanthelasma] : the eyelids demonstrated no xanthelasmas [Normal Oral Mucosa] : normal oral mucosa [No Oral Pallor] : no oral pallor [No Oral Cyanosis] : no oral cyanosis [Normal Jugular Venous A Waves Present] : normal jugular venous A waves present [Normal Jugular Venous V Waves Present] : normal jugular venous V waves present [No Jugular Venous Hernandez A Waves] : no jugular venous hernandez A waves [Heart Rate And Rhythm] : heart rate and rhythm were normal [Murmurs] : no murmurs present [Heart Sounds] : normal S1 and S2 [Respiration, Rhythm And Depth] : normal respiratory rhythm and effort [Exaggerated Use Of Accessory Muscles For Inspiration] : no accessory muscle use [Auscultation Breath Sounds / Voice Sounds] : lungs were clear to auscultation bilaterally [Bowel Sounds] : normal bowel sounds [Abdomen Soft] : soft [Abdomen Tenderness] : non-tender [Abdomen Mass (___ Cm)] : no abdominal mass palpated [Abnormal Walk] : normal gait [Gait - Sufficient For Exercise Testing] : the gait was sufficient for exercise testing [Nail Clubbing] : no clubbing of the fingernails [Cyanosis, Localized] : no localized cyanosis [Petechial Hemorrhages (___cm)] : no petechial hemorrhages [Skin Color & Pigmentation] : normal skin color and pigmentation [] : no rash [No Venous Stasis] : no venous stasis [Skin Lesions] : no skin lesions [No Skin Ulcers] : no skin ulcer [No Xanthoma] : no  xanthoma was observed [Oriented To Time, Place, And Person] : oriented to person, place, and time

## 2022-02-01 ENCOUNTER — APPOINTMENT (OUTPATIENT)
Dept: CARDIOLOGY | Facility: CLINIC | Age: 67
End: 2022-02-01
Payer: COMMERCIAL

## 2022-02-01 VITALS — BODY MASS INDEX: 39.1 KG/M2 | WEIGHT: 264 LBS | HEIGHT: 69 IN

## 2022-02-01 VITALS — SYSTOLIC BLOOD PRESSURE: 120 MMHG | HEART RATE: 64 BPM | DIASTOLIC BLOOD PRESSURE: 80 MMHG | RESPIRATION RATE: 18 BRPM

## 2022-02-01 PROCEDURE — 93000 ELECTROCARDIOGRAM COMPLETE: CPT

## 2022-02-01 PROCEDURE — 99214 OFFICE O/P EST MOD 30 MIN: CPT

## 2022-03-11 NOTE — ED PROVIDER NOTE - CARE PLAN
Principal Discharge DX:	Scrotal swelling  Secondary Diagnosis:	Hydrocele, unspecified hydrocele type CXR negative - No infiltrates, No consolidation, No atelectasis seen

## 2022-03-23 ENCOUNTER — APPOINTMENT (OUTPATIENT)
Dept: SURGERY | Facility: CLINIC | Age: 67
End: 2022-03-23
Payer: COMMERCIAL

## 2022-03-23 VITALS
TEMPERATURE: 97 F | DIASTOLIC BLOOD PRESSURE: 78 MMHG | WEIGHT: 266 LBS | BODY MASS INDEX: 42.25 KG/M2 | HEART RATE: 82 BPM | HEIGHT: 66.5 IN | OXYGEN SATURATION: 98 % | SYSTOLIC BLOOD PRESSURE: 131 MMHG

## 2022-03-23 DIAGNOSIS — M47.9 SPONDYLOSIS, UNSPECIFIED: ICD-10-CM

## 2022-03-23 DIAGNOSIS — K21.9 GASTRO-ESOPHAGEAL REFLUX DISEASE W/OUT ESOPHAGITIS: ICD-10-CM

## 2022-03-23 PROCEDURE — 99205 OFFICE O/P NEW HI 60 MIN: CPT

## 2022-03-24 PROBLEM — K21.9 CHRONIC GERD: Status: ACTIVE | Noted: 2022-03-24

## 2022-03-24 PROBLEM — M47.9 ARTHRITIS OF BACK: Status: ACTIVE | Noted: 2022-03-23

## 2022-03-24 NOTE — HISTORY OF PRESENT ILLNESS
[de-identified] : 65yo male with PMHx of HTN, HLD, JENNIFER on CPAP, previous right inguinal hernia repair with mesh (30 years ago, recurrence), and class III obesity BMI 42.3 presenting for consultation of weight loss surgery/management. Patient states he has struggled with his weight for most of his life, but he has been gaining and having trouble losing weight more recently due to his back and hip issues. Previous weight loss attempts include various diet and exercise regimens with limited success. Patient reports lower back and lower extremity joint pain, which he is planning a hip surgery but needs to lose weight before then. He says that he has reflux symptoms from time to time. He has never had an EGD. Colonoscopy was done 3-4 months ago - negative.\par

## 2022-03-24 NOTE — ASSESSMENT
[FreeTextEntry1] : 65yo male with PMHx of HTN, HLD, JENNIFER on CPAP, previous right inguinal hernia repair with mesh (30 years ago, recurrence), and class III obesity BMI 42.3 presenting for consultation of weight loss surgery/management.\par -discussed surgical options - gastric band, sleeve gastrectomy, homero-en-Y gastric bypass\par -discussed potential surgical complications for each option - including bleeding, infection, pain, hernia, leak, stricture, and blood clots\par -discussed smoking of any kind (cigarettes, marijuana, e-cigarettes) is prohibited\par -at this time, the patient is interested in SLEEVE GASTRECTOMY. He feels he just needs some help starting to lose weight and then he will be able to continue losing weight with good diet\par -I informed her that there may be findings on EGD that disqualify her from sleeve, particularly ace's esophagus.\par -takes extensive pain medications - narcotics\par -GERD - will recommend pre-operative EGD, will Rx PPI in perioperative period and monitor for resolution/progression of symptoms post-operatively\par -seen by nutritionist today\par -pre-operative preparation - will include PCP evaluation, cardiac evaluation, pulmonary evaluation, EGD, nutritional evaluation (3 months), labs

## 2022-03-24 NOTE — PHYSICAL EXAM
[Obese, well nourished, in no acute distress] : obese, well nourished, in no acute distress [Normal] : affect appropriate [de-identified] : right inguinal hernia with overlying scar [de-identified] : no CVA tenderness B/L

## 2022-04-04 ENCOUNTER — APPOINTMENT (OUTPATIENT)
Dept: SURGERY | Facility: CLINIC | Age: 67
End: 2022-04-04

## 2022-04-06 ENCOUNTER — NON-APPOINTMENT (OUTPATIENT)
Age: 67
End: 2022-04-06

## 2022-06-22 ENCOUNTER — APPOINTMENT (OUTPATIENT)
Dept: SURGERY | Facility: CLINIC | Age: 67
End: 2022-06-22

## 2022-07-05 ENCOUNTER — APPOINTMENT (OUTPATIENT)
Dept: CARDIOLOGY | Facility: CLINIC | Age: 67
End: 2022-07-05

## 2022-07-12 ENCOUNTER — APPOINTMENT (OUTPATIENT)
Dept: PAIN MANAGEMENT | Facility: CLINIC | Age: 67
End: 2022-07-12
Payer: OTHER MISCELLANEOUS

## 2022-07-12 VITALS
HEART RATE: 57 BPM | WEIGHT: 266 LBS | BODY MASS INDEX: 42.75 KG/M2 | SYSTOLIC BLOOD PRESSURE: 142 MMHG | DIASTOLIC BLOOD PRESSURE: 72 MMHG | HEIGHT: 66 IN

## 2022-07-12 DIAGNOSIS — Z86.39 PERSONAL HISTORY OF OTHER ENDOCRINE, NUTRITIONAL AND METABOLIC DISEASE: ICD-10-CM

## 2022-07-12 DIAGNOSIS — Z87.39 PERSONAL HISTORY OF OTHER DISEASES OF THE MUSCULOSKELETAL SYSTEM AND CONNECTIVE TISSUE: ICD-10-CM

## 2022-07-12 DIAGNOSIS — Z86.79 PERSONAL HISTORY OF OTHER DISEASES OF THE CIRCULATORY SYSTEM: ICD-10-CM

## 2022-07-12 PROCEDURE — 99213 OFFICE O/P EST LOW 20 MIN: CPT | Mod: PA

## 2022-07-12 PROCEDURE — 99072 ADDL SUPL MATRL&STAF TM PHE: CPT

## 2022-07-12 NOTE — HISTORY OF PRESENT ILLNESS
[FreeTextEntry1] : HISTORY OF PRESENT ILLNESS: Mr. Sanz is a 66 year old male complaining of left hip pain. The patient has a limp when walking. The patient has had this pain for 4 years, with pain worsening over the past 8 months. Patient describes the pain as moderate, being a 10/10 on the pain scale. During the last month the pain has been nearly constant with symptoms worsening no typical pattern. Pain described as burning, sharp. Pain is increased with standing, walking, exercising. Pain is not changed with lying down, sitting, relaxing, coughing/sneezing and bowel movements. Bowel or bladder habits have not changed.\par \par ACTIVITIES: Patient could walk 5+ blocks before the pain starts. Patient can sit 2+ hours before pain starts. Patient can stand 45 minutes before pain starts. The patient never lies down because of pain. Patient uses no assistive walking device at this time. Patient has difficulty performing household chores, socializing with friends, participating in recreational activities and exercising at this time.\par \par PRIOR PAIN TREATMENTS: Moderate relief with injection and TENS unit. Excellent relief with physical therapy and exercise. \par \par Prior Pain Medications:tramadol, Motrin, Baclofen, tramadol, gabapentin, Norco without relief.\par \par TODAY: he returns with complaints of continued moderate-severe pain. Left hip pain remains prevalent with activity restrictions and quality of life limitations. He had been outlined for surgery but it appears that  has denied the procedure and he is awaiting a hearing with [facility]. In the interim, the use of Percocet is continues to provide 30% pain relief with benefit at 50% or higher pending his level of activity. He remains largely content with his reduced pain and wishes to continue without change.

## 2022-07-12 NOTE — REASON FOR VISIT
[Follow-Up Visit] : a follow-up pain management visit [FreeTextEntry2] : Pt presents to the office for back pain.. Needs med refill

## 2022-07-12 NOTE — PHYSICAL EXAM
[Normal Coordination] : normal coordination [Normal Sensation] : normal sensation [Orientated] : orientated [Normal Skin] : normal skin [Well Developed] : well developed [No Respiratory Distress] : no respiratory distress [de-identified] : Back, including spine: Neurological testing of the thoracic and lumbar spine is as follows: absent left Achilles reflex. Gait and function is as follows: patient ambulates without assistive device and mildly antalgic gait. \par \par Left Hip/Thigh: Range of motion of the hip is as follows: pain with flexion and internal rotation. Left hip range of motion restricted.

## 2022-07-12 NOTE — ASSESSMENT
[FreeTextEntry1] : This is a 65 yo M who suffers from chronic lumbosacral pain along with chronic left hip pain due to osteoarthritis. The use of medication is to continue for pain control efforts and I will revisit with him in 4 weeks time. He notes a WC hearing is to take place over the coming month in order to determine left hip replacement authorization.\par \par 25 min encounter.

## 2022-07-12 NOTE — DISCUSSION/SUMMARY
[de-identified] : The patient is stable on current pain medication with analgesia and without notable side effects or any obvious aberrant behaviors exhibited. Will renew medication today.\par \par f/u 4 weeks\par \par I personally reviewed with the PA, this patient's history and physical exam findings, as documented above. I have discussed the relevant areas of concern, having direct implications to the presenting problems and illnesses, and I have personally examined all pertinent and positive and negative findings, which impact on the prior pain management treatment. \par \par CELE Sanabria MD\par \par \par \par

## 2022-07-13 ENCOUNTER — NON-APPOINTMENT (OUTPATIENT)
Age: 67
End: 2022-07-13

## 2022-08-01 DIAGNOSIS — Z01.810 ENCOUNTER FOR PREPROCEDURAL CARDIOVASCULAR EXAMINATION: ICD-10-CM

## 2022-08-10 ENCOUNTER — APPOINTMENT (OUTPATIENT)
Dept: PAIN MANAGEMENT | Facility: CLINIC | Age: 67
End: 2022-08-10

## 2022-09-22 ENCOUNTER — RX RENEWAL (OUTPATIENT)
Age: 67
End: 2022-09-22

## 2022-10-19 ENCOUNTER — APPOINTMENT (OUTPATIENT)
Dept: PAIN MANAGEMENT | Facility: CLINIC | Age: 67
End: 2022-10-19

## 2022-10-19 VITALS
DIASTOLIC BLOOD PRESSURE: 91 MMHG | WEIGHT: 266 LBS | BODY MASS INDEX: 42.75 KG/M2 | SYSTOLIC BLOOD PRESSURE: 172 MMHG | HEIGHT: 66 IN | HEART RATE: 65 BPM

## 2022-10-19 PROCEDURE — 99214 OFFICE O/P EST MOD 30 MIN: CPT

## 2022-10-19 PROCEDURE — 99072 ADDL SUPL MATRL&STAF TM PHE: CPT

## 2022-10-19 NOTE — PHYSICAL EXAM
[Normal Coordination] : normal coordination [Normal Sensation] : normal sensation [Orientated] : orientated [Normal Skin] : normal skin [Well Developed] : well developed [No Respiratory Distress] : no respiratory distress [de-identified] : Back, including spine: Neurological testing of the thoracic and lumbar spine is as follows: absent left Achilles reflex. Gait and function is as follows: patient ambulates without assistive device and mildly antalgic gait. \par \par Left Hip/Thigh: Range of motion of the hip is as follows: pain with flexion and internal rotation. Left hip range of motion restricted.

## 2022-10-19 NOTE — HISTORY OF PRESENT ILLNESS
[FreeTextEntry1] : HISTORY OF PRESENT ILLNESS: Mr. Sanz is a 66 year old male complaining of left hip pain. The patient has a limp when walking. The patient has had this pain for 4 years, with pain worsening over the past 8 months. Patient describes the pain as moderate, being a 10/10 on the pain scale. During the last month the pain has been nearly constant with symptoms worsening no typical pattern. Pain described as burning, sharp. Pain is increased with standing, walking, exercising. Pain is not changed with lying down, sitting, relaxing, coughing/sneezing and bowel movements. Bowel or bladder habits have not changed.\par \par ACTIVITIES: Patient could walk 5+ blocks before the pain starts. Patient can sit 2+ hours before pain starts. Patient can stand 45 minutes before pain starts. The patient never lies down because of pain. Patient uses no assistive walking device at this time. Patient has difficulty performing household chores, socializing with friends, participating in recreational activities and exercising at this time.\par \par PRIOR PAIN TREATMENTS: Moderate relief with injection and TENS unit. Excellent relief with physical therapy and exercise. \par \par Prior Pain Medications:tramadol, Motrin, Baclofen, tramadol, gabapentin, Norco without relief.\par \par TODAY: he returns with complaints of continued moderate-severe pain. The use of Percocet is continues to provide 30% pain relief with benefit at 50% or higher pending his level of activity. He remains largely content with his reduced pain and wishes to continue without change.\par \par He continues with left hip pain. He is status post left hip replacement and is recovering well from.\par \par In regards to his lower back pain, he has noticed a flare up of lower back pain. He states the pain is associated with stiffness and spasms. He currently rates the pain at a 6/10 on the pain scale. He states the pain waxes and weens.

## 2022-10-19 NOTE — ASSESSMENT
[FreeTextEntry1] : 66 year old male presenting with a flare up of lower back pain. He is also status post left hip replacement. I will have him restart physical therapy for the lower back. He will continue with his current medication regimen. Follow up in 3 months will be made for reassessment.\par \par Overall there is at least a 30-50% reduction in pain with the prescribed analgesics. The patient denies any adverse side effects due to the medication (sleeping disturbance, constipation, sleepiness, hallucinations and/or urination problems).\par \par Entered by Mavis Sanders, acting as scribe for Dr. Paul.\par  \par The documentation recorded by the scribe, in my presence, accurately reflects the service I personally performed, and the decisions made by me with my edits as appropriate.\par  \par Best Regards, \par Laz Paul MD \par Board Certified, Anesthesiology \par Board Certified, Pain Medicine\par \par

## 2022-11-08 ENCOUNTER — APPOINTMENT (OUTPATIENT)
Dept: CARDIOLOGY | Facility: CLINIC | Age: 67
End: 2022-11-08

## 2022-11-08 VITALS — HEART RATE: 64 BPM | DIASTOLIC BLOOD PRESSURE: 70 MMHG | SYSTOLIC BLOOD PRESSURE: 120 MMHG | RESPIRATION RATE: 18 BRPM

## 2022-11-08 PROCEDURE — 93000 ELECTROCARDIOGRAM COMPLETE: CPT

## 2022-11-08 PROCEDURE — 99213 OFFICE O/P EST LOW 20 MIN: CPT | Mod: 25

## 2022-12-30 ENCOUNTER — NON-APPOINTMENT (OUTPATIENT)
Age: 67
End: 2022-12-30

## 2023-01-18 ENCOUNTER — APPOINTMENT (OUTPATIENT)
Dept: PAIN MANAGEMENT | Facility: CLINIC | Age: 68
End: 2023-01-18
Payer: OTHER MISCELLANEOUS

## 2023-01-18 VITALS — WEIGHT: 266 LBS | BODY MASS INDEX: 42.75 KG/M2 | HEIGHT: 66 IN

## 2023-01-18 PROCEDURE — 99072 ADDL SUPL MATRL&STAF TM PHE: CPT

## 2023-01-18 PROCEDURE — 99215 OFFICE O/P EST HI 40 MIN: CPT

## 2023-01-18 NOTE — PHYSICAL EXAM
[Normal Coordination] : normal coordination [Normal Sensation] : normal sensation [Orientated] : orientated [Normal Skin] : normal skin [Well Developed] : well developed [No Respiratory Distress] : no respiratory distress [de-identified] : Back: tenderness over the lumbar paraspinals. ROM restricted. Pain with extension. Positive facet loading bilaterally. \par \par Left Hip/Thigh: Range of motion of the hip is as follows: pain with flexion and internal rotation. Left hip range of motion restricted.

## 2023-01-18 NOTE — ASSESSMENT
[FreeTextEntry1] : 66 year old male presenting with a flare up of lower back pain. He is also status post left hip replacement. I will have him restart physical therapy for the lower back and hip. He will continue with his current medication regimen. Follow up in 3 months will be made for reassessment.\par \par Overall there is at least a 30-50% reduction in pain with the prescribed analgesics. The patient denies any adverse side effects due to the medication (sleeping disturbance, constipation, sleepiness, hallucinations and/or urination problems).\par \par Entered by Mavis Sanders, acting as scribe for Dr. Paul.\par  \par The documentation recorded by the scribe, in my presence, accurately reflects the service I personally performed, and the decisions made by me with my edits as appropriate.\par  \par Best Regards, \par Laz Paul MD \par Board Certified, Anesthesiology \par Board Certified, Pain Medicine\par \par

## 2023-01-18 NOTE — HISTORY OF PRESENT ILLNESS
[FreeTextEntry1] : HISTORY OF PRESENT ILLNESS: Mr. Sanz is a 66 year old male complaining of left hip pain. The patient has a limp when walking. The patient has had this pain for 4 years, with pain worsening over the past 8 months. Patient describes the pain as moderate, being a 10/10 on the pain scale. During the last month the pain has been nearly constant with symptoms worsening no typical pattern. Pain described as burning, sharp. Pain is increased with standing, walking, exercising. Pain is not changed with lying down, sitting, relaxing, coughing/sneezing and bowel movements. Bowel or bladder habits have not changed.\par \par ACTIVITIES: Patient could walk 5+ blocks before the pain starts. Patient can sit 2+ hours before pain starts. Patient can stand 45 minutes before pain starts. The patient never lies down because of pain. Patient uses no assistive walking device at this time. Patient has difficulty performing household chores, socializing with friends, participating in recreational activities and exercising at this time.\par \par PRIOR PAIN TREATMENTS: Moderate relief with injection and TENS unit. Excellent relief with physical therapy and exercise. \par \par Prior Pain Medications:tramadol, Motrin, Baclofen, tramadol, gabapentin, Norco without relief.\par \par TODAY: he returns with complaints of continued moderate-severe pain. The use of Percocet is continues to provide 30% pain relief with benefit at 50% or higher pending his level of activity. He remains largely content with his reduced pain and wishes to continue without change.\par \par He continues with left hip pain. He is status post left hip replacement and is recovering well from. He states the pain is rated at a 4/10 on the pain scale. \par \par In regards to his lower back pain, he has noticed a flare up of lower back pain. He states the pain is associated with stiffness and spasms. He states the pain is worse with any sort of rotational movements. He currently rates the pain at a 6/10 on the pain scale. He states the pain waxes and weens. He states certain movements cause severe pain. \par \par Of note, he states his workers compensation carrier denies physical therapy.

## 2023-02-05 ENCOUNTER — FORM ENCOUNTER (OUTPATIENT)
Age: 68
End: 2023-02-05

## 2023-03-01 ENCOUNTER — APPOINTMENT (OUTPATIENT)
Dept: PAIN MANAGEMENT | Facility: CLINIC | Age: 68
End: 2023-03-01
Payer: OTHER MISCELLANEOUS

## 2023-03-01 VITALS — WEIGHT: 266 LBS | HEIGHT: 66 IN | BODY MASS INDEX: 42.75 KG/M2

## 2023-03-01 PROCEDURE — 99215 OFFICE O/P EST HI 40 MIN: CPT

## 2023-03-01 PROCEDURE — 99072 ADDL SUPL MATRL&STAF TM PHE: CPT

## 2023-03-06 NOTE — ASSESSMENT
[FreeTextEntry1] : 66 year old male presenting with a flare up of lower back pain. Patient is presenting with mainly axial pain with impairment in ADLs and functionality pointing to facet joints.  The pain has not responded to conservative care, including medications, stretching, as well as active modalities, such as physical therapy.  Imaging studies as well as physical exam findings corroborate the symptomatology and point to the facet joints.  We will proceed with diagnostic medial branch blocks. I will proceed with a bilateral L3-S1 medial branch block. A follow up for 6 weeks has been made for further reassessment. \par \par Patient had paravertebral tenderness and pain on spinal movement with facet loading. Patient has trialed rehab (Home exercise, physical therapy or chiropractic care) and medications.   Schedule a bilateral diagnostic lumbar medial branch injection L3-S1, if 70% immediate relief for greater than 30 minutes or 50% greater than 24 hours, would schedule RFA at  lumbar medial branches.  If greater than 50% intermediate relief for 30 minutes would schedule a second diagnostic lumbar medial branch block, and if greater than 50% intermediate relief for 30 minutes, would schedule a RFA at lumbar medial branches.\par \par The patient has severe anxiety of procedures that necessitates monitored anesthesia care (MAC). The procedure performed will be close to major nerves, arteries, and spinal cord and/or joint structures. Due to the proximity of these structures, we need the patient to be still during the procedure.  With the help of MAC, this will be safely achieved and decrease the risk of any complications.\par \par I have explained the findings to the patient and all questions have been answered.\par \par  Entered by Felicia Tabor, acting as scribe for Dr. Paul.\par  \par The documentation recorded by the scribe, in my presence, accurately reflects the service I personally performed, and the decisions made by me with my edits as appropriate.\par  \par Best Regards, \par Laz Paul MD \par Board Certified, Anesthesiology \par Board Certified, Pain Medicine\par

## 2023-03-06 NOTE — PHYSICAL EXAM
[Normal Coordination] : normal coordination [Normal Sensation] : normal sensation [Orientated] : orientated [Normal Skin] : normal skin [Well Developed] : well developed [No Respiratory Distress] : no respiratory distress [de-identified] : SPINE: Bilateral facet loading positive. Exquisite tenderness noted over bilateral L3-S1 joint. \par \par LLE (Left Hip/Thigh): Range of motion of the hip is as follows: pain with flexion and internal rotation. Left hip range of motion restricted.  \par

## 2023-03-06 NOTE — HISTORY OF PRESENT ILLNESS
[FreeTextEntry1] : HISTORY OF PRESENT ILLNESS: Mr. Sanz is a 66 year old male complaining of left hip pain. The patient has a limp when walking. The patient has had this pain for 4 years, with pain worsening over the past 8 months. Patient describes the pain as moderate, being a 10/10 on the pain scale. During the last month the pain has been nearly constant with symptoms worsening no typical pattern. Pain described as burning, sharp. Pain is increased with standing, walking, exercising. Pain is not changed with lying down, sitting, relaxing, coughing/sneezing and bowel movements. Bowel or bladder habits have not changed.\par \par ACTIVITIES: Patient could walk 5+ blocks before the pain starts. Patient can sit 2+ hours before pain starts. Patient can stand 45 minutes before pain starts. The patient never lies down because of pain. Patient uses no assistive walking device at this time. Patient has difficulty performing household chores, socializing with friends, participating in recreational activities and exercising at this time.\par \par PRIOR PAIN TREATMENTS: Moderate relief with injection and TENS unit. Excellent relief with physical therapy and exercise. \par \par Prior Pain Medications:tramadol, Motrin, Baclofen, tramadol, gabapentin, Norco without relief.\par \par TODAY:Patient presents for a follow up. He continues to have low back pain, rating his pain 10/10 on pain scale with severe stiffness. Pain is localized across the low back with radicular factors. He is unable to move or turn side to side secondary to the pain.\par \par With respect to his left hip pain, he rates his pain 6/10 on the pain scale with pain localizes to the anterior left hip joint. \par

## 2023-03-19 ENCOUNTER — FORM ENCOUNTER (OUTPATIENT)
Age: 68
End: 2023-03-19

## 2023-04-03 ENCOUNTER — APPOINTMENT (OUTPATIENT)
Dept: PAIN MANAGEMENT | Facility: CLINIC | Age: 68
End: 2023-04-03
Payer: OTHER MISCELLANEOUS

## 2023-04-03 PROCEDURE — 00630 ANES PX LUMBAR REGION NOS: CPT | Mod: QZ,P2

## 2023-04-03 PROCEDURE — 93770 DETERMINATION VENOUS PRESS: CPT

## 2023-04-03 PROCEDURE — 72100 X-RAY EXAM L-S SPINE 2/3 VWS: CPT

## 2023-04-03 PROCEDURE — 94761 N-INVAS EAR/PLS OXIMETRY MLT: CPT

## 2023-04-03 PROCEDURE — 64493 INJ PARAVERT F JNT L/S 1 LEV: CPT | Mod: RT

## 2023-04-03 PROCEDURE — 93040 RHYTHM ECG WITH REPORT: CPT | Mod: 59

## 2023-04-03 PROCEDURE — 64494 INJ PARAVERT F JNT L/S 2 LEV: CPT | Mod: LT

## 2023-04-03 PROCEDURE — 64495 INJ PARAVERT F JNT L/S 3 LEV: CPT | Mod: LT

## 2023-04-03 NOTE — PROCEDURE
[FreeTextEntry3] : LUMBAR MEDIAL BRANCH INJECTION UNDER FLUOROSCOPY\par \par \par \par Date:  2023\par \par Patient: NELSON CROWE\par \par :  1955\par \par \par \par \par \par Preoperative Diagnosis: Lumbar spondylosis; facet arthropathy L3-4, L4-5, L5-S1\par Postoperative Diagnosis: Same\par Procedure:\par                    1. Diagnostic Lumbar medial branch nerve injection Bilateral L3-4, L4-5, L5-S1\par                    2. Fluoroscopic guidance and localization of needle\par \par \par Physician: Laz Paul M.D.\par Anesthesiologist/CRNA: Mr Avila\par Anesthesia: MAC Versed 2mg/ Fentanyl 50 mcg IVP\par Medical Necessity:  Failure of conservative management.\par \par \par \par Consent:  Though unusual, the possible complications including infection, bleeding, nerve damage, hospital admission, stroke, pneumothorax, death or failure of the procedure are theoretically possible. The patient was educated about the of the procedure and alternative therapies. All questions were answered and the patient freely gave consent to proceed.\par Indication for Fluoroscopy:  This procedure requires the precise placement of the spinal needle into the epidural space.  It is the only way to accurately and safely perform the injection.\par \par \par \par Procedure Note: \par After obtaining written consent, the patient was placed on the fluoroscopic table in the prone position with the pillow placed under the hips. A betadine prep was performed and the area surrounded by sterile drapes. A time out was performed.  Fluoroscopy unit was positioned over the patient and images of the spine (AP and oblique views) obtained.  The entry sites for the above noted levels median branch were determined and local anesthesia with lidocaine 2%-1cc was provided. At each level a 22guage 3 ½ inch spinal needle was placed at the level of the median branch to the facet under fluoroscopic guidance.  A dose of Lidocaine 2% 0.5cc was administered at each level the needles at each level were withdrawn following administration of the medication.  There was no significant bleeding at the site.  A dressing was placed by the assistant nurse.\par \par \par \par Findings: Lumbar spine AP and oblique views with x-ray degenerative changes noted.\par \par \par Complications: none. \par \par Disposition: : I have examined the patient and there are no new physical findings since the original presentation.  Sensory and motor function were intact. The patient met discharge criteria see nurses notes. The discharge instruction sheet was reviewed and given to the patient. The patient was discharged home with a .\par \par Comment:  If at least 70% relief or 50% greater than 24 hours, would proceed to RFA. If 50% relief is less than 24 hours, would repeat to confirm for RFA. If less than 50% relief, assess for other pain generators. Call if any problems\par \par Indication for RFA: The pt had a positive response to medial branch diagnostic injections and is considered a good candidate for the thermal RF ablation procedure. The diagnostic injection provided at least 50% reduction in pain for the duration of the local anesthetic.\par \par \par \par The following criteria have been met: 1) failed response to at least 3 months of conservative management; 2) patient has LBP that is non-radicular, suggesting facet joint origin supported by absence of nerve root compression documented on the medical record on H&P and radiographic evaluation; 3) minimum of 6 months has elapsed since any prior RF treatments. If prior ablation therapy has been performed, it provided at least 50% relief for minimum of 10-12 weeks; 4) no prior spinal fusion at the vertebral level being treated;\par \par \par \par This document was signed by:\par \par Laz Paul MD \par Board Certified, Anesthesiology \par Board Certified, Pain Medicine \par \par

## 2023-04-12 ENCOUNTER — APPOINTMENT (OUTPATIENT)
Dept: PAIN MANAGEMENT | Facility: CLINIC | Age: 68
End: 2023-04-12
Payer: OTHER MISCELLANEOUS

## 2023-04-12 VITALS
SYSTOLIC BLOOD PRESSURE: 129 MMHG | DIASTOLIC BLOOD PRESSURE: 69 MMHG | BODY MASS INDEX: 36.29 KG/M2 | WEIGHT: 245 LBS | TEMPERATURE: 98.1 F | HEART RATE: 81 BPM | HEIGHT: 69 IN

## 2023-04-12 PROCEDURE — 99215 OFFICE O/P EST HI 40 MIN: CPT

## 2023-04-12 NOTE — PHYSICAL EXAM
[Normal Coordination] : normal coordination [Normal Sensation] : normal sensation [Orientated] : orientated [Normal Skin] : normal skin [Well Developed] : well developed [No Respiratory Distress] : no respiratory distress [de-identified] : SPINE: Bilateral facet loading positive. Exquisite tenderness noted over bilateral L3-S1 joint. \par \par LLE (Left Hip/Thigh): Range of motion of the hip is as follows: pain with flexion and internal rotation. Left hip range of motion restricted.  \par

## 2023-04-12 NOTE — HISTORY OF PRESENT ILLNESS
[FreeTextEntry1] : HISTORY OF PRESENT ILLNESS: Mr. Sanz is a 66 year old male complaining of left hip pain. The patient has a limp when walking. The patient has had this pain for 4 years, with pain worsening over the past 8 months. Patient describes the pain as moderate, being a 10/10 on the pain scale. During the last month the pain has been nearly constant with symptoms worsening no typical pattern. Pain described as burning, sharp. Pain is increased with standing, walking, exercising. Pain is not changed with lying down, sitting, relaxing, coughing/sneezing and bowel movements. Bowel or bladder habits have not changed.\par \par ACTIVITIES: Patient could walk 5+ blocks before the pain starts. Patient can sit 2+ hours before pain starts. Patient can stand 45 minutes before pain starts. The patient never lies down because of pain. Patient uses no assistive walking device at this time. Patient has difficulty performing household chores, socializing with friends, participating in recreational activities and exercising at this time.\par \par PRIOR PAIN TREATMENTS: Moderate relief with injection and TENS unit. Excellent relief with physical therapy and exercise. \par \par Prior Pain Medications:tramadol, Motrin, Baclofen, tramadol, gabapentin, Norco without relief.\par \par TODAY: Since last visit, he underwent a bilateral L3-S1 medial branch block on 4-3-2023 with approximately 85% relief of his axial lower back pain. He noticed decrease in pain along with better mobility and better ability to sleep for 2 days following this procedure. \par \par Today, he states his usual lower back pain is returning. He states the pain is in the back and associated with stiffness and spasms. He states there is pain with any sort of rotational movements of the spine. He rates the pain at a 8/10 on the pain scale. He states the pain is constant in nature and daily. \par \par Of note, he is scheduled to see a spine surgeon in the coming month to discuss possible surgical correction. \par \par With respect to his left hip pain, he rates his pain 6/10 on the pain scale with pain localizes to the anterior left hip joint. He is seeing [facility] next month to discuss surgical correction. \par

## 2023-04-12 NOTE — ASSESSMENT
[FreeTextEntry1] : 66 year old male presenting with ongoing lower back and right hip pain. At the present, I would like to hold off on any further injections for right now. He is scheduled to see some surgeons in the coming weeks. Follow up in 6 weeks will be made for reassessment. \par \par Entered by Mavis Sanders, acting as scribe for Dr. Paul.\par  \par The documentation recorded by the scribe, in my presence, accurately reflects the service I personally performed, and the decisions made by me with my edits as appropriate.\par  \par Best Regards, \par Laz Paul MD \par Board Certified, Anesthesiology \par Board Certified, Pain Medicine\par

## 2023-05-09 ENCOUNTER — APPOINTMENT (OUTPATIENT)
Dept: CARDIOLOGY | Facility: CLINIC | Age: 68
End: 2023-05-09

## 2023-05-31 ENCOUNTER — APPOINTMENT (OUTPATIENT)
Dept: PAIN MANAGEMENT | Facility: CLINIC | Age: 68
End: 2023-05-31
Payer: OTHER MISCELLANEOUS

## 2023-05-31 VITALS — WEIGHT: 245 LBS | HEIGHT: 69 IN | BODY MASS INDEX: 36.29 KG/M2

## 2023-05-31 PROCEDURE — 99215 OFFICE O/P EST HI 40 MIN: CPT

## 2023-05-31 NOTE — PHYSICAL EXAM
[Normal Coordination] : normal coordination [Normal Sensation] : normal sensation [Orientated] : orientated [Normal Skin] : normal skin [Well Developed] : well developed [No Respiratory Distress] : no respiratory distress [de-identified] : SPINE: Range of motion restricted in all planes, lumbar paraspinals tender, positive straight leg raise bilaterally, Achilles reflexes 0 out of 4, knee flexion weakness bilaterally\par \par LLE (Left Hip/Thigh): Range of motion of the hip is as follows: pain with flexion and internal rotation. Left hip range of motion restricted.  \par

## 2023-05-31 NOTE — ASSESSMENT
[FreeTextEntry1] : Patient with worsening low back pain in the neurological deficits.  I suspect new disc versus bony pathology.  I will put in for MRI of the lumbar spine and CT scan of the lumbar spine.\par \par He wishes to attempt a return.  I will accommodate.  He cannot sit for longer than 30 minutes.\par \par He currently has moderate partial disability\par  \par  \par Laz Paul MD \par Board Certified, Anesthesiology \par Board Certified, Pain Medicine\par

## 2023-05-31 NOTE — HISTORY OF PRESENT ILLNESS
[FreeTextEntry1] : HISTORY OF PRESENT ILLNESS: Mr. Sanz is a 66 year old male complaining of left hip pain. The patient has a limp when walking. The patient has had this pain for 4 years, with pain worsening over the past 8 months. Patient describes the pain as moderate, being a 10/10 on the pain scale. During the last month the pain has been nearly constant with symptoms worsening no typical pattern. Pain described as burning, sharp. Pain is increased with standing, walking, exercising. Pain is not changed with lying down, sitting, relaxing, coughing/sneezing and bowel movements. Bowel or bladder habits have not changed.\par \par ACTIVITIES: Patient could walk 5+ blocks before the pain starts. Patient can sit 2+ hours before pain starts. Patient can stand 45 minutes before pain starts. The patient never lies down because of pain. Patient uses no assistive walking device at this time. Patient has difficulty performing household chores, socializing with friends, participating in recreational activities and exercising at this time.\par \par PRIOR PAIN TREATMENTS: Moderate relief with injection and TENS unit. Excellent relief with physical therapy and exercise. \par \par Prior Pain Medications:tramadol, Motrin, Baclofen, tramadol, gabapentin, Norco without relief.\par \par TODAY: \par \par Patient presents for follow-up.  He continues with low back pain, which is not worsening.  There are no radicular symptoms in the lower extremities.  He has severe stiffness and spasms across the low back as well.  He complains of giveaway weakness.  Pain can be as high as 9 out of 10.\par \par

## 2023-06-09 ENCOUNTER — APPOINTMENT (OUTPATIENT)
Dept: ORTHOPEDIC SURGERY | Facility: CLINIC | Age: 68
End: 2023-06-09
Payer: OTHER MISCELLANEOUS

## 2023-06-09 DIAGNOSIS — M25.511 PAIN IN RIGHT SHOULDER: ICD-10-CM

## 2023-06-09 PROCEDURE — 99203 OFFICE O/P NEW LOW 30 MIN: CPT

## 2023-06-09 PROCEDURE — 73030 X-RAY EXAM OF SHOULDER: CPT | Mod: RT

## 2023-06-09 NOTE — HISTORY OF PRESENT ILLNESS
[de-identified] : Patient is here for evaluation of right shoulder pain\par Affecting quality of life\par Has pain and weakness with loss of rom\par Wakes up at night due to pain\par \par WC case\par \par NAD\par Right shoulder:\par TTP ant GH joint, bicipital groove\par FF 0-140 (passive 175)\par ER 40\par IR L5\par Pain with terminal rom\par Weakness to abduction and ER\par Pos Impingement\par Pos Fitch\par Pos Cross Arm Adduction\par Negative instability\par Positive scapula dyskinesia\par \par XRay right shoulder negative for fracture, dislocation, arthritis\par \par Plan\par went over findings\par explained the xray\par needs an mri right shoudler as clinically he has a R tear\par pending the MRI, may need surgery to fix the tear\par has previously done pt and pain control\par fu after mri\par

## 2023-06-13 ENCOUNTER — APPOINTMENT (OUTPATIENT)
Dept: CARDIOLOGY | Facility: CLINIC | Age: 68
End: 2023-06-13

## 2023-06-25 ENCOUNTER — FORM ENCOUNTER (OUTPATIENT)
Age: 68
End: 2023-06-25

## 2023-07-12 ENCOUNTER — APPOINTMENT (OUTPATIENT)
Dept: PAIN MANAGEMENT | Facility: CLINIC | Age: 68
End: 2023-07-12
Payer: OTHER MISCELLANEOUS

## 2023-07-12 VITALS
WEIGHT: 250 LBS | HEIGHT: 69 IN | DIASTOLIC BLOOD PRESSURE: 66 MMHG | TEMPERATURE: 52 F | SYSTOLIC BLOOD PRESSURE: 112 MMHG | BODY MASS INDEX: 37.03 KG/M2

## 2023-07-12 PROCEDURE — 99214 OFFICE O/P EST MOD 30 MIN: CPT

## 2023-07-12 NOTE — HISTORY OF PRESENT ILLNESS
[FreeTextEntry1] : HISTORY OF PRESENT ILLNESS: Mr. Sanz is a 66 year old male complaining of left hip pain. The patient has a limp when walking. The patient has had this pain for 4 years, with pain worsening over the past 8 months. Patient describes the pain as moderate, being a 10/10 on the pain scale. During the last month the pain has been nearly constant with symptoms worsening no typical pattern. Pain described as burning, sharp. Pain is increased with standing, walking, exercising. Pain is not changed with lying down, sitting, relaxing, coughing/sneezing and bowel movements. Bowel or bladder habits have not changed.\par \par ACTIVITIES: Patient could walk 5+ blocks before the pain starts. Patient can sit 2+ hours before pain starts. Patient can stand 45 minutes before pain starts. The patient never lies down because of pain. Patient uses no assistive walking device at this time. Patient has difficulty performing household chores, socializing with friends, participating in recreational activities and exercising at this time.\par \par PRIOR PAIN TREATMENTS: Moderate relief with injection and TENS unit. Excellent relief with physical therapy and exercise. \par \par Prior Pain Medications:tramadol, Motrin, Baclofen, tramadol, gabapentin, Norco without relief.\par \par TODAY: Revisit encounter. \par \par He is presenting with severe chief complaints of lower back pain.  He has radiation now into the bilateral lower extremities with associated numbness, tingling and spasms.  He has pain which travels in L5 type distribution.  He states he also has some trouble with putting pressure over the legs secondary to the severe pain.  \par \par He has been utilizing ibuprofen along with baclofen which provides him with 30% improvement in pain as well as increase in function.  He denies any side effects.\par \par \par \par

## 2023-07-12 NOTE — ASSESSMENT
[FreeTextEntry1] : 67-year-old male presenting with ongoing severe lower back pain.  He now has worsening lumbar radicular pain with absent Achilles reflexes. Patient is presenting with radicular pain with impairment in ADLs and functionality.  The pain has not responded to conservative care, including medications, stretching, as well as active modalities, such as physical therapy.  Imaging studies as well as physical exam findings corroborate the symptomatology and radicular pain.  We will proceed with an epidural at this point. He will continue with baclofen and ibuprofen. Follow up in 6 weeks will be made for reassessment. I have explained the findings to the patient and all questions have been answered. \par  \par Patient had a MRI that shows a radicular component along with pain referred into the lower extremity. Patient has trialed rehab (Home exercise, physical therapy or chiropractic care) and medications I will schedule a L5-S1 interlaminar. \par \par Risk, benefits, pros and cons of procedure were explained to the patient using models and diagrams and their questions were answered. \par \par \par The patient has severe anxiety of procedures that necessitates monitored anesthesia care (MAC). The procedure performed will be close to major nerves, arteries, and spinal cord and/or joint structures. Due to the proximity of these structures, we need the patient to be still during the procedure.  With the help of MAC, this will be safely achieved and decrease the risk of any complications.\par \par Overall there is at least a 30-50% reduction in pain with the prescribed analgesics. The patient denies any adverse side effects due to the medication (sleeping disturbance, constipation, sleepiness, hallucinations and/or urination problems).\par \par Entered by Mavis Sanders, acting as scribe for Dr. Paul.\par  \par The documentation recorded by the scribe, in my presence, accurately reflects the service I personally performed, and the decisions made by me with my edits as appropriate.\par  \par Best Regards, \par Laz Paul MD \par Board Certified, Anesthesiology \par Board Certified, Pain Medicine\par  \par

## 2023-07-12 NOTE — PHYSICAL EXAM
[Normal Coordination] : normal coordination [Normal Sensation] : normal sensation [Orientated] : orientated [Normal Skin] : normal skin [Well Developed] : well developed [No Respiratory Distress] : no respiratory distress [de-identified] : BACK - tenderness into the lumbar paraspinals. ROM restricted. Pain with flexion. Positive SLR bilaterally. Absent bilateral achilles reflexes.

## 2023-07-16 ENCOUNTER — RX RENEWAL (OUTPATIENT)
Age: 68
End: 2023-07-16

## 2023-07-27 ENCOUNTER — FORM ENCOUNTER (OUTPATIENT)
Age: 68
End: 2023-07-27

## 2023-07-28 ENCOUNTER — APPOINTMENT (OUTPATIENT)
Dept: ORTHOPEDIC SURGERY | Facility: CLINIC | Age: 68
End: 2023-07-28

## 2023-08-03 ENCOUNTER — APPOINTMENT (OUTPATIENT)
Dept: CARDIOLOGY | Facility: CLINIC | Age: 68
End: 2023-08-03
Payer: COMMERCIAL

## 2023-08-03 VITALS — HEART RATE: 58 BPM | WEIGHT: 248 LBS | BODY MASS INDEX: 36.73 KG/M2 | HEIGHT: 69 IN

## 2023-08-03 VITALS — RESPIRATION RATE: 18 BRPM | DIASTOLIC BLOOD PRESSURE: 80 MMHG | SYSTOLIC BLOOD PRESSURE: 128 MMHG

## 2023-08-03 PROCEDURE — 99214 OFFICE O/P EST MOD 30 MIN: CPT | Mod: 25

## 2023-08-03 PROCEDURE — 93000 ELECTROCARDIOGRAM COMPLETE: CPT

## 2023-08-08 ENCOUNTER — APPOINTMENT (OUTPATIENT)
Dept: PAIN MANAGEMENT | Facility: CLINIC | Age: 68
End: 2023-08-08
Payer: OTHER MISCELLANEOUS

## 2023-08-08 ENCOUNTER — APPOINTMENT (OUTPATIENT)
Dept: PAIN MANAGEMENT | Facility: CLINIC | Age: 68
End: 2023-08-08
Payer: COMMERCIAL

## 2023-08-08 PROCEDURE — 62323 NJX INTERLAMINAR LMBR/SAC: CPT

## 2023-08-08 PROCEDURE — 00630 ANES PX LUMBAR REGION NOS: CPT | Mod: QZ,P2

## 2023-08-08 PROCEDURE — 94761 N-INVAS EAR/PLS OXIMETRY MLT: CPT

## 2023-08-08 PROCEDURE — 93770 DETERMINATION VENOUS PRESS: CPT

## 2023-08-08 PROCEDURE — 93040 RHYTHM ECG WITH REPORT: CPT | Mod: 59

## 2023-08-08 PROCEDURE — 72100 X-RAY EXAM L-S SPINE 2/3 VWS: CPT

## 2023-08-08 NOTE — PROCEDURE
[FreeTextEntry1] : Interlaminar Lumbar Epidural Steroid Injection  [FreeTextEntry3] : Interlaminar Lumbar Epidural Steroid Injection     Preoperative Diagnosis: Lumbar Radiculopathy Postoperative Diagnosis: Lumbar Radiculopathy    Procedure: 1. Interlaminar L5-S1 Lumbar Epidural Injection under fluoroscopy 2. Epidurography 3. Fluoroscopic guidance and localization of needle    Physician: Laz Paul M.D. Anesthesiologist/CRNA: Mr. Avila Anesthesia: See nurses note. MAC/Cold spray/ Versed 2 mg, Fentanyl 100 mcg   Medical Necessity:  Failure of conservative management. Consent:  Though unusual, the possible complications including infection, bleeding, nerve damage, hospital admission, stroke, pneumothorax, death or failure of the procedure are theoretically possible. The patient was educated about the of the procedure and alternative therapies. All questions were answered and the patient freely gave consent to proceed. Indication for Fluoroscopy:  This procedure requires the precise placement of the spinal needle into the epidural space.  It is the only way to accurately and safely perform the injection.    PROCEDURE NOTE:  After obtaining written consent, in the sitting position an IV was placed in the upper extremity by the CRNA. The patient was placed in the prone position. The lumbosacral region was prepped with chloraprep and draped in usual sterile fashion. A time out was performed.  The L5-S1 interspace was identified using fluoroscopy. The skin was infiltrated with lidocaine 2% -- 2mL for subcutaneous analgesia. The epidural space was identified using a 17g touhy needle with a midline approach using a loss of resistance technique. 2mL omnipaque was used to define the space. A solution of 6ml of preservative-free sterile saline and 1ml of depomedrol 80mg was infused in 1mL increments slowly with minimal pressure on the syringe into the epidural space. The procedure was tolerated well. There was no evidence of CSF, paresthesias, or heme.     Epidurogram: Distal and proximal spread was noted.  Findings: Lumbar Spine AP and oblique views with x-ray degenerative changes noted.  Complications: none.   Disposition: I have examined the patient and there are no new physical findings since original presentation. The patient was discharged home with a . The discharge instruction sheet was given to the patient. Motor function was intact.  Comment: 1st Interlaminar MARIA GUADALUPE today, depending on effectiveness would schedule 2nd Interlaminar in 1-2 weeks vs caudal epidural steroid vs follow up in office. Call if any problems    This document was signed by:   Laz Paul MD  Board Certified, Anesthesiology  Board Certified, Pain Medicine

## 2023-08-23 ENCOUNTER — APPOINTMENT (OUTPATIENT)
Dept: PAIN MANAGEMENT | Facility: CLINIC | Age: 68
End: 2023-08-23
Payer: OTHER MISCELLANEOUS

## 2023-08-23 VITALS — BODY MASS INDEX: 37.13 KG/M2 | HEIGHT: 68 IN | WEIGHT: 245 LBS

## 2023-08-23 PROCEDURE — 99215 OFFICE O/P EST HI 40 MIN: CPT

## 2023-08-23 NOTE — HISTORY OF PRESENT ILLNESS
[FreeTextEntry1] : HISTORY OF PRESENT ILLNESS: Mr. Sanz is a 66 year old male complaining of left hip pain. The patient has a limp when walking. The patient has had this pain for 4 years, with pain worsening over the past 8 months. Patient describes the pain as moderate, being a 10/10 on the pain scale. During the last month the pain has been nearly constant with symptoms worsening no typical pattern. Pain described as burning, sharp. Pain is increased with standing, walking, exercising. Pain is not changed with lying down, sitting, relaxing, coughing/sneezing and bowel movements. Bowel or bladder habits have not changed.  ACTIVITIES: Patient could walk 5+ blocks before the pain starts. Patient can sit 2+ hours before pain starts. Patient can stand 45 minutes before pain starts. The patient never lies down because of pain. Patient uses no assistive walking device at this time. Patient has difficulty performing household chores, socializing with friends, participating in recreational activities and exercising at this time.  PRIOR PAIN TREATMENTS: Moderate relief with injection and TENS unit. Excellent relief with physical therapy and exercise.   Prior Pain Medications:tramadol, Motrin, Baclofen, tramadol, gabapentin, Norco without relief.  TODAY: Revisit encounter.   He is status post L5-S1 interlaminar epidural steroid injection.  He noted 85% relief for 3 weeks.  He also had improvement in mobility as well as better ability to walk following this injection.  Currently, he states his usual symptoms have returned.  He currently rates pain as 9 out of 10 on the pain scale.  He states he continues to have shooting and radiating pain into the legs and L5 type distribution.  He states his currently following up with spinal surgery and hip surgery.  No surgical interventions have been planned.  He continues with home exercises.  He has been utilizing ibuprofen along with baclofen which provides him with 30% improvement in pain as well as increase in function.  He denies any side effects.

## 2023-08-23 NOTE — PHYSICAL EXAM
[Normal Coordination] : normal coordination [Normal Sensation] : normal sensation [Orientated] : orientated [Normal Skin] : normal skin [Well Developed] : well developed [No Respiratory Distress] : no respiratory distress [de-identified] : BACK - tenderness into the lumbar paraspinals. ROM restricted. Pain with flexion. Positive SLR bilaterally. Absent bilateral achilles reflexes.

## 2023-08-23 NOTE — ASSESSMENT
[FreeTextEntry1] : 67-year-old male presenting with ongoing severe lower back pain.  He now has worsening lumbar radicular pain with absent Achilles reflexes. Patient is presenting with radicular pain with impairment in ADLs and functionality.  The pain has not responded to conservative care, including medications, stretching, as well as active modalities, such as physical therapy.  Imaging studies as well as physical exam findings corroborate the symptomatology and radicular pain.  We will proceed with an epidural at this point. He will continue with baclofen and ibuprofen. Follow up in 6 weeks will be made for reassessment. I have explained the findings to the patient and all questions have been answered.    Patient had a MRI that shows a radicular component along with pain referred into the lower extremity. Patient has trialed rehab (Home exercise, physical therapy or chiropractic care) and medications I will schedule a L5-S1 interlaminar.   Risk, benefits, pros and cons of procedure were explained to the patient using models and diagrams and their questions were answered.    The patient has severe anxiety of procedures that necessitates monitored anesthesia care (MAC). The procedure performed will be close to major nerves, arteries, and spinal cord and/or joint structures. Due to the proximity of these structures, we need the patient to be still during the procedure.  With the help of MAC, this will be safely achieved and decrease the risk of any complications.  Overall there is at least a 30-50% reduction in pain with the prescribed analgesics. The patient denies any adverse side effects due to the medication (sleeping disturbance, constipation, sleepiness, hallucinations and/or urination problems).  Entered by Mavis Sanders, acting as scribe for Dr. Paul.   The documentation recorded by the scribe, in my presence, accurately reflects the service I personally performed, and the decisions made by me with my edits as appropriate.   Best Regards,  Laz Paul MD  Board Certified, Anesthesiology  Board Certified, Pain Medicine

## 2023-08-29 ENCOUNTER — APPOINTMENT (OUTPATIENT)
Dept: PAIN MANAGEMENT | Facility: CLINIC | Age: 68
End: 2023-08-29

## 2023-10-04 ENCOUNTER — APPOINTMENT (OUTPATIENT)
Dept: PAIN MANAGEMENT | Facility: CLINIC | Age: 68
End: 2023-10-04
Payer: OTHER MISCELLANEOUS

## 2023-10-04 VITALS — BODY MASS INDEX: 37.13 KG/M2 | WEIGHT: 245 LBS | HEIGHT: 68 IN

## 2023-10-04 PROCEDURE — 99214 OFFICE O/P EST MOD 30 MIN: CPT | Mod: ACP

## 2023-11-15 ENCOUNTER — APPOINTMENT (OUTPATIENT)
Dept: PAIN MANAGEMENT | Facility: CLINIC | Age: 68
End: 2023-11-15

## 2023-11-15 ENCOUNTER — APPOINTMENT (OUTPATIENT)
Dept: PAIN MANAGEMENT | Facility: CLINIC | Age: 68
End: 2023-11-15
Payer: OTHER MISCELLANEOUS

## 2023-11-15 DIAGNOSIS — E66.01 MORBID (SEVERE) OBESITY DUE TO EXCESS CALORIES: ICD-10-CM

## 2023-11-15 DIAGNOSIS — M16.12 UNILATERAL PRIMARY OSTEOARTHRITIS, LEFT HIP: ICD-10-CM

## 2023-11-15 DIAGNOSIS — M17.0 BILATERAL PRIMARY OSTEOARTHRITIS OF KNEE: ICD-10-CM

## 2023-11-15 PROCEDURE — 99214 OFFICE O/P EST MOD 30 MIN: CPT | Mod: ACP

## 2023-12-11 ENCOUNTER — APPOINTMENT (OUTPATIENT)
Dept: ORTHOPEDIC SURGERY | Facility: CLINIC | Age: 68
End: 2023-12-11
Payer: OTHER MISCELLANEOUS

## 2023-12-11 VITALS — BODY MASS INDEX: 38.45 KG/M2 | HEIGHT: 67 IN | WEIGHT: 245 LBS

## 2023-12-11 PROCEDURE — 73030 X-RAY EXAM OF SHOULDER: CPT | Mod: LT

## 2023-12-11 PROCEDURE — 99214 OFFICE O/P EST MOD 30 MIN: CPT | Mod: ACP

## 2023-12-19 ENCOUNTER — APPOINTMENT (OUTPATIENT)
Dept: PAIN MANAGEMENT | Facility: CLINIC | Age: 68
End: 2023-12-19
Payer: OTHER MISCELLANEOUS

## 2023-12-19 DIAGNOSIS — M51.26 OTHER INTERVERTEBRAL DISC DISPLACEMENT, LUMBAR REGION: ICD-10-CM

## 2023-12-19 PROCEDURE — 99214 OFFICE O/P EST MOD 30 MIN: CPT

## 2023-12-19 NOTE — PHYSICAL EXAM
[Normal Coordination] : normal coordination [Normal Sensation] : normal sensation [Orientated] : orientated [Normal Skin] : normal skin [Well Developed] : well developed [No Respiratory Distress] : no respiratory distress [de-identified] : Lumbar Spine Exam:   Inspection:   erythema (-)   ecchymosis (-)   rashes (-)   alignment: no scoliosis       Palpation:   Midline lumbar tenderness:             (-)   paraspinal tenderness:                  L (+) ; R (+)   sciatic nerve tenderness :             L (+) ; R (+)   SI joint tenderness:                        L (-) ; R (-)   GTB tenderness:                            L (-);  R (-)       Limited ROM 2/2 to pain     Strength:  5/5 throughout all muscle groups of the lower extremity                                                                               Right       Left      Hip Flexion:                (5/5)       (5/5)   Quadriceps:               (5/5)       (5/5)   Hamstrings:                (5/5)       (5/5)   Ankle Dorsiflexion:     (5/5)       (5/5)   EHL:                           (5/5)       (5/5)   Ankle Plantarflexion:  (5/5)       (5/5)       Special Tests:   SLR:                           R (+) ; L (+)   Facet loading:            R (-) ; L (-)   AUGUSTINE test:               R (-) ; L (-)      Neurologic:  Light touch intact throughout LE   Reflexes normal and symmetric      Gait:  non- antalgic gait  ambulates w/o assistive device

## 2023-12-19 NOTE — HISTORY OF PRESENT ILLNESS
[FreeTextEntry1] : HISTORY OF PRESENT ILLNESS: Mr. Sanz is a 66 year old male complaining of left hip pain. The patient has a limp when walking. The patient has had this pain for 4 years, with pain worsening over the past 8 months. Patient describes the pain as moderate, being a 10/10 on the pain scale. During the last month the pain has been nearly constant with symptoms worsening no typical pattern. Pain described as burning, sharp. Pain is increased with standing, walking, exercising. Pain is not changed with lying down, sitting, relaxing, coughing/sneezing and bowel movements. Bowel or bladder habits have not changed.  ACTIVITIES: Patient could walk 5+ blocks before the pain starts. Patient can sit 2+ hours before pain starts. Patient can stand 45 minutes before pain starts. The patient never lies down because of pain. Patient uses no assistive walking device at this time. Patient has difficulty performing household chores, socializing with friends, participating in recreational activities and exercising at this time.  PRIOR PAIN TREATMENTS: Moderate relief with injection and TENS unit. Excellent relief with physical therapy and exercise.   Prior Pain Medications: tramadol, Motrin, Baclofen, tramadol, gabapentin, Norco without relief.  12/19/23: This is a previous patient of [facility]. The patient presenting with ongoing severe lower back pain. He is status post L5-S1 interlaminar epidural steroid injection on 8/08/23 with at least one month of over 75% relief.  He had improvement in mobility as well as better ability to walk following this injection but unfortunately the pain has returned to baseline. The pain is starting in his lumbar spine and radiating to his lower extremities bilaterally. Pain is constant.  He will continue with baclofen and ibuprofen. The pain is made worse when hunched over and alleviated with good posture and standing up straight. The patient utilizes ibuprofen 800 mg 1 to two times a day along with baclofen with good pain control and denies any side effects. The pain is 7/10 on the pain scale. Patient denies any bowel or bladder dysfunction, incontinence, or saddle anesthesia.

## 2023-12-19 NOTE — DISCUSSION/SUMMARY
[de-identified] : A discussion regarding available pain management treatment options occurred with the patient.  These included interventional, rehabilitative, pharmacological, and alternative modalities. We will proceed with the followin. Will proceed with L5-S1 LESI w/ MAC 2. He has multiple refills on baclofen and ibuprofen 800. Stop ibuprofen for 3 days prior to MARIA GUADALUPE  Treatment options were discussed. The patient has been having persistent, severe low back pain and lumbar radicular pain that has minimally improved with conservative therapy thus far (including physical therapy, home stretching and anti-inflammatory medications. The patient was given the option of proceeding with a lumbar epidural steroid injection to try and get the patient some pain relief. The risks and benefits were discussed, which included the associated problems with steroids, bleeding, nerve injury, lack of improvement with pain, and 0.5% chance of a post dural puncture headache.  The patient has severe anxiety of procedures that necessitates monitored anesthesia care (MAC). The procedure performed will be close to major nerves, arteries, and spinal cord and/or joint structures. Due to the proximity of these structures, we need the patient to be still during the procedure.  With the help of MAC, this will be safely achieved and decrease the risk of any complications.  follow up two weeks post injection  I Valerie Rea attest that this documentation has been prepared under the direction and in the presence of provider Dr. Jesús Zaidi.   The documentation recorded by the scribe in my presence, accurately reflects the service I personally performed, and the decisions made by me with my edits as appropriate.       Jesús Zaidi, DO

## 2023-12-19 NOTE — DATA REVIEWED
[FreeTextEntry1] : independently reviewed the patients MRI of the lumbar spine that was done @ radiology services  that shows herniation at the L5-S1 level   Patient had a MRI that shows a radicular component along with pain referred into the lower extremity

## 2023-12-30 ENCOUNTER — APPOINTMENT (OUTPATIENT)
Dept: MRI IMAGING | Facility: CLINIC | Age: 68
End: 2023-12-30
Payer: OTHER MISCELLANEOUS

## 2023-12-30 PROCEDURE — 73221 MRI JOINT UPR EXTREM W/O DYE: CPT | Mod: LT

## 2024-01-02 ENCOUNTER — APPOINTMENT (OUTPATIENT)
Dept: ORTHOPEDIC SURGERY | Facility: CLINIC | Age: 69
End: 2024-01-02
Payer: OTHER MISCELLANEOUS

## 2024-01-02 VITALS — BODY MASS INDEX: 37.13 KG/M2 | HEIGHT: 68 IN | WEIGHT: 245 LBS

## 2024-01-02 DIAGNOSIS — S49.92XA UNSPECIFIED INJURY OF LEFT SHOULDER AND UPPER ARM, INITIAL ENCOUNTER: ICD-10-CM

## 2024-01-02 PROCEDURE — 99214 OFFICE O/P EST MOD 30 MIN: CPT

## 2024-01-02 NOTE — HISTORY OF PRESENT ILLNESS
[de-identified] : Patient is here for evaluation of left shoulder pain Affecting quality of life Has pain and weakness with loss of rom Wakes up at night due to pain  WC injury, was moving cabinets and felt pop to shoulder when cabinet fell  NAD Left shoulder: TTP ant GH joint, bicipital groove FF 0-140 (passive 175) ER 40 IR L5 Pain with terminal rom Weakness to abduction and ER Pos Impingement Pos Fitch Pos Cross Arm Adduction Negative instability Positive scapula dyskinesia  mri left shoulder: full thickness RC tear with retraction, labral tear, ac arthritis  Plan went over findings explained the imaging op vs nonop explained due to full thickness traumatic RC tear, will proceed with surgery  left shoulder arthroscopy, rotator cuff repair, decompression, distal clavicle excision, possible open biceps tenodesis  Operative and nonoperative options discussed with patient. Surgical risks, benefits, and alternatives explained. Surgical risks include but are not exclusive to bleeding, infection, neurovascular damage, continued pain, stiffness, scarring, rsd, dvt/pe, potential failure of surgery that may require further surgery in the future. I went over incisions and rehabilitation. All questions answered.

## 2024-01-03 NOTE — REVIEW OF SYSTEMS
2ND. CALL FOR ROOM [id number].   [Arthralgia] : arthralgia [Chills] : no chills [Discharge] : no discharge [Decrease Hearing] : no decrease in hearing [Lower Ext Edema] : no lower extremity edema [SOB at rest] : no shortness of breath at rest

## 2024-01-11 ENCOUNTER — APPOINTMENT (OUTPATIENT)
Dept: PAIN MANAGEMENT | Facility: CLINIC | Age: 69
End: 2024-01-11
Payer: OTHER MISCELLANEOUS

## 2024-01-11 PROCEDURE — 00630 ANES PX LUMBAR REGION NOS: CPT | Mod: QZ,P3

## 2024-01-11 PROCEDURE — 62323 NJX INTERLAMINAR LMBR/SAC: CPT

## 2024-01-12 NOTE — PROCEDURE
[FreeTextEntry3] : Date of Service: 01/11/2024   Account: [id number]   Patient: NELSON CROWE   YOB: 1955   Age: 68 year   Surgeon:      Jesús Zaidi DO   Assistant:    None   Pre-Operative Diagnosis:         Lumbar radiculopathy M54.16   Post Operative Diagnosis:       Lumbar radiculopathy M54.16   Procedure:             Lumbar interlaminar (L5-S1) epidural steroid injection under fluoroscopic guidance   Anesthesia:            MUSTAPHA Casanova   This procedure was carried out using fluoroscopic guidance.  The risks and benefits of the procedure were discussed extensively with the patient.  The consent of the patient was obtained, and the following procedure was performed. The patient was placed in the prone position on the fluoroscopy table and the area was prepped and draped in a sterile fashion.  A timeout was performed with all essential staff present and the site and side were verified.   The patient was placed in the prone position with a pillow under the abdomen to minimize the lumbar lordosis.  The lumbar area was prepped and draped in a sterile fashion.  Under A/P view with slight cephalad-caudad angulation, the L5-S1 interspace was identified and marked.  Using sterile technique, the superficial skin was anesthetized with 1% Lidocaine.  A 20-gauge Tuohy needle was advanced into the epidural space under fluoroscopy using loss of resistance at the L5-S1 level.  After negative aspiration for heme or CSF, an epidurogram was obtained in the A/P and lateral fluoroscopic views using 2-3 cc of Omnipaque contrast confirming epidural placement of the needle.  After this, 5 cc of of a mixture of preservative free normal saline and 20mg decadron were injected into the epidural space.   The needle was subsequently removed.  Vital signs remained normal.  Pulse oximeter was used throughout the procedure and the patient's pulse and oxygen saturation remained within normal limits.  The patient tolerated the procedure well.  There were no complications.  The patient was instructed to apply ice over the injection sites for twenty minutes every two hours for the next 24 to 48 hours.   Disposition:        1. The patient was advised to F/U in 1-2 weeks to assess the response to the injection.      2. The patient was also instructed to contact me immediately if there were any concerns related to the procedure performed.

## 2024-01-16 ENCOUNTER — APPOINTMENT (OUTPATIENT)
Dept: PAIN MANAGEMENT | Facility: CLINIC | Age: 69
End: 2024-01-16

## 2024-01-31 ENCOUNTER — APPOINTMENT (OUTPATIENT)
Dept: PAIN MANAGEMENT | Facility: CLINIC | Age: 69
End: 2024-01-31
Payer: OTHER MISCELLANEOUS

## 2024-01-31 PROCEDURE — 99214 OFFICE O/P EST MOD 30 MIN: CPT

## 2024-01-31 NOTE — HISTORY OF PRESENT ILLNESS
[FreeTextEntry1] : HISTORY OF PRESENT ILLNESS: Mr. Sanz is a 66 year old male complaining of left hip pain. The patient has a limp when walking. The patient has had this pain for 4 years, with pain worsening over the past 8 months. Patient describes the pain as moderate, being a 10/10 on the pain scale. During the last month the pain has been nearly constant with symptoms worsening no typical pattern. Pain described as burning, sharp. Pain is increased with standing, walking, exercising. Pain is not changed with lying down, sitting, relaxing, coughing/sneezing and bowel movements. Bowel or bladder habits have not changed.  ACTIVITIES: Patient could walk 5+ blocks before the pain starts. Patient can sit 2+ hours before pain starts. Patient can stand 45 minutes before pain starts. The patient never lies down because of pain. Patient uses no assistive walking device at this time. Patient has difficulty performing household chores, socializing with friends, participating in recreational activities and exercising at this time.  PRIOR PAIN TREATMENTS: Moderate relief with injection and TENS unit. Excellent relief with physical therapy and exercise.   Prior Pain Medications: tramadol, Motrin, Baclofen, tramadol, gabapentin, Norco without relief.  TODAY, 1-: Revisit encounter.   Since last visit, he underwent a L5-S1 interlaminar epidural steroid injection with minimal relief. He continues today with ongoing severe lower back pain. He has no pain radiating into the lower extremities. Pain is made worse with transitional movements. Pain is relieved with sitting. Pain is mainly axial and associated with stiffness and spasms of the lumbar spine. Pain is present daily and constant. He has significant limitation of his ADLS. Patient denies any bowel or bladder dysfunction, incontinence, or saddle anesthesia.  He has been taking Percocet which was prescribed by his surgeon who did his hip replacement. He has been taking this sparingly since his surgical correction. He just ran out of this medication this month. He was able to tolerate the symptoms with the use of this medication.

## 2024-01-31 NOTE — PHYSICAL EXAM
[de-identified] : Lumbar Spine Exam:   Inspection:   erythema (-)   ecchymosis (-)   rashes (-)   alignment: no scoliosis       Palpation:   Midline lumbar tenderness:             (-)   paraspinal tenderness:                  L (+) ; R (+)   sciatic nerve tenderness :             L (+) ; R (+)   SI joint tenderness:                        L (-) ; R (-)   GTB tenderness:                            L (-);  R (-)       Limited ROM 2/2 to pain     Strength:  5/5 throughout all muscle groups of the lower extremity                                                                               Right       Left      Hip Flexion:                (5/5)       (5/5)   Quadriceps:               (5/5)       (5/5)   Hamstrings:                (5/5)       (5/5)   Ankle Dorsiflexion:     (5/5)       (5/5)   EHL:                           (5/5)       (5/5)   Ankle Plantarflexion:  (5/5)       (5/5)       Special Tests:   SLR:                           R (-) ; L (-)   Facet loading:            R (+) ; L (+)   AUGUSTINE test:               R (-) ; L (-)      Neurologic:  Light touch intact throughout LE   Reflexes normal and symmetric      Gait:  non- antalgic gait  ambulates w/o assistive device

## 2024-01-31 NOTE — DISCUSSION/SUMMARY
[de-identified] : A discussion regarding available pain management treatment options occurred with the patient.  These included interventional, rehabilitative, pharmacological, and alternative modalities. We will proceed with the following:    From a pain management standpoint, the patient is NOT currently at I. My plan is to obtain new imaging to proceed with additional injections.   Interventional treatment options: 1. Discussed trialing a lumbar medial branch block. I would like to obtain a new MRI before proceeding with this procedure due to his intractable worsening pain.   Imagin. MRI lumbar spine w/o contrast to evaluate for anatomic changes of the lumbar discs, nerves, and surrounding tissue that will help provide information to accurately diagnose the patient's cause of pain and therefore treat said pain generator in the most effective way possible - whether that be specific physical therapy recommendations, medications, and/or interventional therapies.    Medication: 1. Sent over a 1-time short supply of oxyCODONE-Acetaminophen 5/325 mg q12h, 2-week supply. He was advised to use this medication sparingly.  2. Re-ordered Ibuprofen 800 mg daily.  3. Re-ordered Baclofen 10 mg 1 tablet BID.   ISTOP CHECKED Percocet is for short supply. This will not be continued chronically pending interventional treatment  The R/B/A of chronic opiate therapy for non-malignant pain including, but not limited to, the risk of addiction, overdose, respiratory depression, and death was discussed and accepted by the patient. Patient was also informed that they should not consume alcohol or drive a motor vehicle under any circumstances while taking opiate pain medications.  The patient reports good pain control with their current medication regimen. They deny any intolerable side effects. There is no obvious aberrant behavior. The patient is more functional with regard to his ADLs and social activity as result of their current medication regimen.   - Follow up in 2 weeks after injection.   I Mavis Sanders attest that this documentation has been prepared under the direction and in the presence of provider Dr. Jesús Zaidi.  The documentation recorded by the scribe in my presence, accurately reflects the service I personally performed, and the decisions made by me with my edits as appropriate.   Jesús Zaidi, DO

## 2024-02-08 ENCOUNTER — APPOINTMENT (OUTPATIENT)
Dept: CARDIOLOGY | Facility: CLINIC | Age: 69
End: 2024-02-08

## 2024-02-21 ENCOUNTER — APPOINTMENT (OUTPATIENT)
Dept: PAIN MANAGEMENT | Facility: CLINIC | Age: 69
End: 2024-02-21
Payer: OTHER MISCELLANEOUS

## 2024-02-21 DIAGNOSIS — M54.16 RADICULOPATHY, LUMBAR REGION: ICD-10-CM

## 2024-02-21 PROCEDURE — 99214 OFFICE O/P EST MOD 30 MIN: CPT

## 2024-02-22 PROBLEM — M54.16 LUMBAR RADICULITIS: Status: ACTIVE | Noted: 2023-12-19

## 2024-02-24 ENCOUNTER — APPOINTMENT (OUTPATIENT)
Dept: MRI IMAGING | Facility: CLINIC | Age: 69
End: 2024-02-24
Payer: OTHER MISCELLANEOUS

## 2024-02-24 PROCEDURE — 72148 MRI LUMBAR SPINE W/O DYE: CPT

## 2024-03-01 NOTE — PHYSICAL EXAM
[de-identified] : Lumbar Spine Exam:   Inspection:   erythema (-)   ecchymosis (-)   rashes (-)   alignment: no scoliosis       Palpation:   Midline lumbar tenderness:             (-)   paraspinal tenderness:                  L (+) ; R (+)   sciatic nerve tenderness :             L (+) ; R (+)   SI joint tenderness:                        L (-) ; R (-)   GTB tenderness:                            L (-);  R (-)       Limited ROM 2/2 to pain     Strength:  5/5 throughout all muscle groups of the lower extremity                                                                               Right       Left      Hip Flexion:                (5/5)       (5/5)   Quadriceps:               (5/5)       (5/5)   Hamstrings:                (5/5)       (5/5)   Ankle Dorsiflexion:     (5/5)       (5/5)   EHL:                           (5/5)       (4+/5)   Ankle Plantarflexion:  (5/5)       (5/5)       Special Tests:   SLR:                           R (-) ; L (+)   Facet loading:            R (+) ; L (+)   AUGUSTINE test:               R (-) ; L (-)      Neurologic:  Light touch intact throughout LE   Reflexes normal and symmetric      Gait:  non- antalgic gait  ambulates w/o assistive device   Right hip: Inspection: no evidence of atrophy, effusion, rash     Palpation: + tenderness over anterior hip, adductors, PSIS, gluteus, GTB    Stability: no gross instability bilaterally    Alignment: normal    ROM:   Painful reduced ROM especially flexion, external and internal rotation.      Special tests:  AUGUSTINE + (groin pain)    Stability:  No gross instability bilaterally

## 2024-03-01 NOTE — HISTORY OF PRESENT ILLNESS
[FreeTextEntry1] : HISTORY OF PRESENT ILLNESS: Mr. Sanz is a 66-year-old male complaining of left hip pain. The patient has a limp when walking. The patient has had this pain for 4 years, with pain worsening over the past 8 months. Patient describes the pain as moderate, being a 10/10 on the pain scale. During the last month the pain has been nearly constant with symptoms worsening no typical pattern. Pain described as burning, sharp. Pain is increased with standing, walking, exercising. Pain is not changed with lying down, sitting, relaxing, coughing/sneezing and bowel movements. Bowel or bladder habits have not changed.  ACTIVITIES: Patient could walk 5+ blocks before the pain starts. Patient can sit 2+ hours before pain starts. Patient can stand 45 minutes before pain starts. The patient never lies down because of pain. Patient uses no assistive walking device at this time. Patient has difficulty performing household chores, socializing with friends, participating in recreational activities and exercising at this time.  PRIOR PAIN TREATMENTS: Moderate relief with injection and TENS unit. Excellent relief with physical therapy and exercise.   Prior Pain Medications: tramadol, Motrin, Baclofen, tramadol, gabapentin, Norco without relief.  TODAY, 2-: Revisit encounter.   He continues today with ongoing severe lower back pain. He has no pain radiating into the lower extremities. Pain is made worse with transitional movements. Pain is relieved with sitting. Pain is mainly axial and associated with stiffness and spasms of the lumbar spine. When the pain comes on the pain is sudden and debilitating. Pain is present daily and constant. He has significant limitation of his ADLS. Patient denies any bowel or bladder dysfunction, incontinence, or saddle anesthesia.  He is also presenting with severe right hip pain. Pain is made worse with weight bearing. He has pain with rotational movements as well. He has sharp, stabbing pains which can be debilitating. Pain also refers into the lateral portion of the thigh and into the groin. Pain is present daily and constant.   At his last visit, he was given a short supply of oxyCODONE-Acetaminophen 5-325 mg. He does have improvement in pain and increase in function with he does use this medication. He was given a 14-day supply, 28 tablets. He has about 6 pills left of this. He was told at last visit that I can only prescribe this for a one-time short supply. He does also take Ibuprofen 800 mg along with Baclofen 10 mg daily.

## 2024-03-01 NOTE — DISCUSSION/SUMMARY
[de-identified] : A discussion regarding available pain management treatment options occurred with the patient.  These included interventional, rehabilitative, pharmacological, and alternative modalities. We will proceed with the following:    From a pain management standpoint, the patient is NOT currently at I. My plan is to obtain new imaging to proceed with additional injections.   Interventional treatment options: 1. Discussed trialing a lumbar medial branch block. I would like to obtain a new MRI before proceeding with this procedure due to his intractable worsening pain.   Imagin. MRI lumbar spine w/o contrast to evaluate for anatomic changes of the lumbar discs, nerves, and surrounding tissue that will help provide information to accurately diagnose the patient's cause of pain and therefore treat said pain generator in the most effective way possible - whether that be specific physical therapy recommendations, medications, and/or interventional therapies.    Medication: - Continue with Baclofen and Ibuprofen.  1. I will send over ONE MORE short supply of oxyCODONE-Acetaminophen 5-325 mg to be taken q12h 1 week supply.  I advised him that this is the last time I will send over this medication. I provided him with a list of pain management doctors who he can call to see if they will be willing to prescribe this for him montly.   The R/B/A of chronic opiate therapy for non-malignant pain including, but not limited to, the risk of addiction, overdose, respiratory depression, and death was discussed and accepted by the patient. Patient was also informed that they should not consume alcohol or drive a motor vehicle under any circumstances while taking opiate pain medications.  The patient reports good pain control with their current medication regimen. They deny any intolerable side effects. There is no obvious aberrant behavior. The patient is more functional with regard to his ADLs and social activity as result of their current medication regimen.   ISTOP CHECKED Reference # [ [id number] ]  - Follow up in 2 weeks after imaging.  I Mavis Sanders attest that this documentation has been prepared under the direction and in the presence of provider Dr. Jesús Zaidi.  The documentation recorded by the scribe in my presence, accurately reflects the service I personally performed, and the decisions made by me with my edits as appropriate.   Jesús Zaidi, DO

## 2024-03-18 ENCOUNTER — RX RENEWAL (OUTPATIENT)
Age: 69
End: 2024-03-18

## 2024-03-20 ENCOUNTER — APPOINTMENT (OUTPATIENT)
Dept: PAIN MANAGEMENT | Facility: CLINIC | Age: 69
End: 2024-03-20
Payer: OTHER MISCELLANEOUS

## 2024-03-20 DIAGNOSIS — M25.552 PAIN IN LEFT HIP: ICD-10-CM

## 2024-03-20 PROCEDURE — 99214 OFFICE O/P EST MOD 30 MIN: CPT

## 2024-03-20 RX ORDER — IBUPROFEN 800 MG/1
800 TABLET, FILM COATED ORAL
Qty: 30 | Refills: 3 | Status: DISCONTINUED | COMMUNITY
Start: 2023-10-04 | End: 2024-03-20

## 2024-03-22 PROBLEM — M25.552 LEFT HIP PAIN: Status: ACTIVE | Noted: 2022-03-23

## 2024-03-22 NOTE — PHYSICAL EXAM
[de-identified] : Lumbar Spine Exam:   Inspection:   erythema (-)   ecchymosis (-)   rashes (-)   alignment: no scoliosis       Palpation:   Midline lumbar tenderness:             (-)   paraspinal tenderness:                  L (+) ; R (+)   sciatic nerve tenderness :             L (+) ; R (+)   SI joint tenderness:                        L (-) ; R (-)   GTB tenderness:                            L (-);  R (-)       Limited ROM 2/2 to pain     Strength:  5/5 throughout all muscle groups of the lower extremity                                                                               Right       Left      Hip Flexion:                (5/5)       (5/5)   Quadriceps:               (5/5)       (5/5)   Hamstrings:                (5/5)       (5/5)   Ankle Dorsiflexion:     (5/5)       (5/5)   EHL:                           (5/5)       (4+/5)   Ankle Plantarflexion:  (5/5)       (5/5)       Special Tests:   SLR:                           R (-) ; L (+)   Facet loading:            R (+) ; L (+)   AUGUSTINE test:               R (-) ; L (-)      Neurologic:  Light touch intact throughout LE   Reflexes normal and symmetric      Gait:  non- antalgic gait  ambulates w/o assistive device   Right hip: Inspection: no evidence of atrophy, effusion, rash     Palpation: + tenderness over anterior hip, adductors, PSIS, gluteus, GTB    Stability: no gross instability bilaterally    Alignment: normal    ROM:   Painful reduced ROM especially flexion, external and internal rotation.      Special tests:  AUGUSTINE + (groin pain)    Stability:  No gross instability bilaterally

## 2024-03-22 NOTE — DISCUSSION/SUMMARY
[de-identified] : A discussion regarding available pain management treatment options occurred with the patient.  These included interventional, rehabilitative, pharmacological, and alternative modalities. We will proceed with the following:    From a pain management standpoint, the patient is NOT currently at [facility]. My plan is to obtain new imaging to proceed with additional injections.   Interventional treatment options: 1. Proceed with a Bilateral L3, L4, L5 medial branch facet block.  Patient has lumbar axial pain that is consistent with facet joint pathology. A diagnostic temporary block with local anesthetic of the medial branch was performed and has resulted in at least a 50% reduction in pain for the duration of the specific local anesthetic effect.  The pain is not radicular and there is absence of nerve root compression.  There is no prior spinal fusion surgery at the level targeted.  The pain has failed to respond to three months of conservative therapy.   The risks and benefits were discussed, which included the associated problems with steroids, bleeding, nerve injury, lack of improvement with pain, and 0.5% chance of a post dural puncture headache.   The patient has severe anxiety of procedures that necessitates monitored anesthesia care (MAC). The procedure performed will be close to major nerves, arteries, and spinal cord and/or joint structures. Due to the proximity of these structures, we need the patient to be still during the procedure.  With the help of MAC, this will be safely achieved and decrease the risk of any complications.     Medication: - Continue with Baclofen and Ibuprofen.  1. Ordered Meloxicam 15 mg to be taken daily as needed.  2. Ordered another short supply of oxyCODONE-Acetaminophen 5-325 mg to be taken q12h as needed, 7-day supply, 14-tablets.   ISTOP CHECKED and Verified.   I advised the patient that the NSAID should be taken with food.  In addition while taking the prescribed NSAID, no over the counter or other NSAIDs should be used, such as ibuprofen (Motrin or Advil) or naproxen (Aleve) as this can cause stomach upset or other side effects.  If needed for fever or breakthrough pain Tylenol can be used.  The R/B/A of chronic opiate therapy for non-malignant pain including, but not limited to, the risk of addiction, overdose, respiratory depression, and death was discussed and accepted by the patient. Patient was also informed that they should not consume alcohol or drive a motor vehicle under any circumstances while taking opiate pain medications.  The patient reports good pain control with their current medication regimen. They deny any intolerable side effects. There is no obvious aberrant behavior. The patient is more functional with regard to his ADLs and social activity as result of their current medication regimen.   The patient has reviewed and signed an opioid agreement delineating the use of opioid pain medications within our practice. All questions answered to patient's satisfaction.  - Follow up in 2 weeks post injection.   I Mavis Sanders attest that this documentation has been prepared under the direction and in the presence of provider Dr. Jesús Zaidi.  The documentation recorded by the scribe in my presence, accurately reflects the service I personally performed, and the decisions made by me with my edits as appropriate.   Jesús Zaidi, DO

## 2024-03-22 NOTE — HISTORY OF PRESENT ILLNESS
[FreeTextEntry1] : HISTORY OF PRESENT ILLNESS: Mr. Sanz is a 66-year-old male complaining of left hip pain. The patient has a limp when walking. The patient has had this pain for 4 years, with pain worsening over the past 8 months. Patient describes the pain as moderate, being a 10/10 on the pain scale. During the last month the pain has been nearly constant with symptoms worsening no typical pattern. Pain described as burning, sharp. Pain is increased with standing, walking, exercising. Pain is not changed with lying down, sitting, relaxing, coughing/sneezing and bowel movements. Bowel or bladder habits have not changed.  ACTIVITIES: Patient could walk 5+ blocks before the pain starts. Patient can sit 2+ hours before pain starts. Patient can stand 45 minutes before pain starts. The patient never lies down because of pain. Patient uses no assistive walking device at this time. Patient has difficulty performing household chores, socializing with friends, participating in recreational activities and exercising at this time.  PRIOR PAIN TREATMENTS: Moderate relief with injection and TENS unit. Excellent relief with physical therapy and exercise.   Prior Pain Medications: tramadol, Motrin, Baclofen, tramadol, gabapentin, Norco without relief.  TODAY, 3-: Revisit encounter.   He continues today with ongoing severe lower back pain. He feels like over the last couple of days his pain has been worse than baseline pain. He has no pain radiating into the lower extremities. Pain is made worse with transitional movements. Pain is relieved with sitting. Pain is mainly axial and associated with stiffness and spasms of the lumbar spine. He has severe pain with getting up out of a seated position. When the pain comes on the pain is sudden and debilitating. Pain is present daily and constant. He has significant limitation of his ADLS. Patient denies any bowel or bladder dysfunction, incontinence, or saddle anesthesia.  He is also presenting with severe right hip pain. Pain is made worse with weight bearing. He has pain with rotational movements as well. He has sharp, stabbing pains which can be debilitating. Pain also refers into the lateral portion of the thigh and into the groin. Pain is present daily and constant.   At his last visit, he was given a short supply of oxyCODONE-Acetaminophen 5-325 mg. He does have improvement in pain and increase in function with he does use this medication. He was given a 14-day supply, 28 tablets. He has since ran out of this medication. He does also take Ibuprofen 800 mg along with Baclofen 10 mg daily.

## 2024-03-22 NOTE — DATA REVIEWED
[FreeTextEntry1] : independently reviewed the patients MRI of the lumbar spine that was done @ radiology services  that shows herniation at the L5-S1 level   Patient had a MRI that shows a radicular component along with pain referred into the lower extremity  MRI of the lumbar spine taken on 2- showed multilevel degenerative disc disease L4-5 and L5-S1 with a focal disc herniation seen at L5-S1 central and anterolateral to the left of midline displacing the exiting left S1 roots posteriorly. Diffuse annular bulge L4-5. Small central disc herniation seen on the lateral projections at T122-12 without spinal cord compression. Minor facet hypertrophic changes appear to slightly compresses the dorsal spinal cord at T11-12.

## 2024-04-17 ENCOUNTER — APPOINTMENT (OUTPATIENT)
Dept: PAIN MANAGEMENT | Facility: CLINIC | Age: 69
End: 2024-04-17
Payer: OTHER MISCELLANEOUS

## 2024-04-17 VITALS — WEIGHT: 245 LBS | HEIGHT: 68 IN | BODY MASS INDEX: 37.13 KG/M2

## 2024-04-17 PROCEDURE — 99214 OFFICE O/P EST MOD 30 MIN: CPT

## 2024-04-17 NOTE — WORK
[Was the competent medical cause of the injury] : was the competent medical cause of the injury [Are consistent with the injury] : are consistent with the injury [Total (100%)] : total (100%) [Other: ___] : [unfilled] [Patient] : patient [I provided the services listed above] :  I provided the services listed above.

## 2024-04-18 NOTE — DISCUSSION/SUMMARY
[de-identified] : A discussion regarding available pain management treatment options occurred with the patient.  These included interventional, rehabilitative, pharmacological, and alternative modalities. We will proceed with the following:    Interventional treatment options: 1. Proceed with a Bilateral L3, L4, L5 medial branch facet block.  Patient presents with axial lumbar pain that has not responded to 3 months of conservative therapy including physical therapy or NSAID therapy.  The pain is interfering with activities of daily living and functionality.  The pain is exacerbated by facet loading.  Positive Kemps maneuver which is defined by pain reproduction with extension and rotation of the lumbar spine to the affected side.  There is no unexplained neurologic deficit.  There is no history of systemic infection, unstable medical condition, bleeding tendency, or local infection.  The injection is being performed to diagnose the facet joint as the source of the individual's pain.   The risks and benefits were discussed, which included the associated problems with steroids, bleeding, nerve injury, lack of improvement with pain  The patient has severe anxiety of procedures that necessitates monitored anesthesia care (MAC). The procedure performed will be close to major nerves, arteries, and spinal cord and/or joint structures. Due to the proximity of these structures, we need the patient to be still during the procedure.  With the help of MAC, this will be safely achieved and decrease the risk of any complications.   Medication: - Continue with Baclofen and Ibuprofen.  - Continue with Meloxicam 15 mg to be taken daily as needed.   - Follow up in 2 weeks post injection.   I Mavis Sanders attest that this documentation has been prepared under the direction and in the presence of provider Dr. Jesús Zaidi.  The documentation recorded by the scribe in my presence, accurately reflects the service I personally performed, and the decisions made by me with my edits as appropriate.   Jesús Zaidi, DO

## 2024-04-18 NOTE — HISTORY OF PRESENT ILLNESS
[FreeTextEntry1] : HISTORY OF PRESENT ILLNESS: Mr. Sanz is a 66-year-old male complaining of left hip pain. The patient has a limp when walking. The patient has had this pain for 4 years, with pain worsening over the past 8 months. Patient describes the pain as moderate, being a 10/10 on the pain scale. During the last month the pain has been nearly constant with symptoms worsening no typical pattern. Pain described as burning, sharp. Pain is increased with standing, walking, exercising. Pain is not changed with lying down, sitting, relaxing, coughing/sneezing and bowel movements. Bowel or bladder habits have not changed.  ACTIVITIES: Patient could walk 5+ blocks before the pain starts. Patient can sit 2+ hours before pain starts. Patient can stand 45 minutes before pain starts. The patient never lies down because of pain. Patient uses no assistive walking device at this time. Patient has difficulty performing household chores, socializing with friends, participating in recreational activities and exercising at this time.  PRIOR PAIN TREATMENTS: Moderate relief with injection and TENS unit. Excellent relief with physical therapy and exercise.   Prior Pain Medications: tramadol, Motrin, Baclofen, tramadol, gabapentin, Norco without relief.  TODAY, 4-: Revisit encounter.   He continues today with ongoing severe lower back pain. He feels like over the last couple of days his pain has been worse than baseline pain. He has no pain radiating into the lower extremities. Pain is made worse with transitional movements. Pain is relieved with sitting. Pain is mainly axial and associated with stiffness and spasms of the lumbar spine. He has severe pain with getting up out of a seated position. When the pain comes on the pain is sudden and debilitating. Pain is present daily and constant. He has significant limitation of his ADLS. Patient denies any bowel or bladder dysfunction, incontinence, or saddle anesthesia.  He is also presenting with severe right hip pain. Pain is made worse with weight bearing. He has pain with rotational movements as well. He has sharp, stabbing pains which can be debilitating. Pain also refers into the lateral portion of the thigh and into the groin. Pain is present daily and constant.   At his last visit, he was given a short supply of oxyCODONE-Acetaminophen 5-325 mg. He does have improvement in pain and increase in function with he does use this medication. He was given a 14-day supply, 28 tablets. He has since ran out of this medication. He does also take Ibuprofen 800 mg along with Baclofen 10 mg daily.

## 2024-04-18 NOTE — PHYSICAL EXAM
[de-identified] : L spine   No rashes, erythema, edema noted TTP b/l lumbar paraspinal muscles Positive facet loading bilaterally Positive KEMPS test bilaterally LLE: 5/5 strength RLE: 5/5 strength Sensation intact b/l LE   L hip  No rashes, erythema, edema  TTP at anterior hip, gluteus, GTB Stability: no gross instability  ROM: Painful ROM  AUGUSTINE + Gait: limping with weight bearing   R hip   No rashes, erythema, edema TTP at anterior hip, gluteus, GTB Stability: no gross instability ROM: Painful & limited ROM  AUGUSTINE + Gait: limping

## 2024-04-29 ENCOUNTER — APPOINTMENT (OUTPATIENT)
Dept: PAIN MANAGEMENT | Facility: CLINIC | Age: 69
End: 2024-04-29

## 2024-04-29 ENCOUNTER — APPOINTMENT (OUTPATIENT)
Dept: PAIN MANAGEMENT | Facility: CLINIC | Age: 69
End: 2024-04-29
Payer: OTHER MISCELLANEOUS

## 2024-04-29 PROCEDURE — 64494 INJ PARAVERT F JNT L/S 2 LEV: CPT | Mod: 50

## 2024-04-29 PROCEDURE — 64493 INJ PARAVERT F JNT L/S 1 LEV: CPT | Mod: 50

## 2024-05-01 NOTE — PROCEDURE
[FreeTextEntry3] : Date of Service: 04/29/2024   Account: [id number]  Patient: NELSON CROWE   YOB: 1955  Age: 68 year  Surgeon:                  Jesús Zaidi DO  Assistant:                None  Pre-Operative Diagnosis:   Lumbar Spondylosis      Post Operative Diagnosis:  Lumbar Spondylosis  Procedure:             Bilateral  (L3, L4, L5 medial branch) L4-5, L5-S1 facet joint block under fluoroscopic guidance.  Anesthesia:            none  This procedure was carried out using fluoroscopic guidance.  The risks and benefits of the procedure were discussed extensively with the patient.  The consent of the patient was obtained and the following procedure was performed. The patient was placed in the prone position on the fluoroscopy table and the area was prepped and draped in a sterile fashion.  A timeout was performed with all essential staff present and the site and side were verified.  The lumbar vertebral bodies were identified and the fluoroscope was obliqued ipsilateral to approximately 30 degrees to reveal the appropriate anatomical view.  The junction of the superior articulate process and transverse process at the right L4, and L5 levels were identified and marked. A spinal needle was then introduced and advanced to the above target points at the junction of the SAP and transverse processes until bone was contacted.  Fluoroscope then focused on the right sacral ala on A/P view, and marked at this point.  A spinal needle was then advanced under fluoroscopic guidance until bone was contacted at the ala.    After negative aspiration for heme and CSF, an 1 cc of a mixture of 0.5% Marcaine and 10mg decadron was injected at each of the injection sites.  The procedure was performed in the exact same fashion on the contralateral left side at the L4, L5 and sacral ala levels.  The needles were subsequently removed.  Vital signs remained normal.  Pulse oximeter was used throughout the procedure and the patient's pulse and oxygen saturation remained within normal limits.  The patient tolerated the procedure well.  There were no complications.  The patient was instructed to apply ice over the injection sites for twenty minutes every two hours for the next 24 to 48 hours.  Disposition:       1. The patient was advised to F/U in 1-2 weeks to assess the response to the injection.       2. They were advised to keep a pain diary to report the results of the diagnostic block at their [facility].      3. The patient was also instructed to contact me immediately if there were any concerns related to the procedure performed.

## 2024-05-22 ENCOUNTER — APPOINTMENT (OUTPATIENT)
Dept: PAIN MANAGEMENT | Facility: CLINIC | Age: 69
End: 2024-05-22
Payer: OTHER MISCELLANEOUS

## 2024-05-22 DIAGNOSIS — M47.816 SPONDYLOSIS W/OUT MYELOPATHY OR RADICULOPATHY, LUMBAR REGION: ICD-10-CM

## 2024-05-22 PROCEDURE — 99214 OFFICE O/P EST MOD 30 MIN: CPT

## 2024-05-28 PROBLEM — M47.816 LUMBAR SPONDYLOSIS: Status: ACTIVE | Noted: 2022-07-12

## 2024-05-28 NOTE — PHYSICAL EXAM
[de-identified] : L spine   No rashes, erythema, edema noted TTP b/l lumbar paraspinal muscles Positive facet loading bilaterally Positive KEMPS test bilaterally LLE: 5/5 strength RLE: 5/5 strength Sensation intact b/l LE   L hip  No rashes, erythema, edema  TTP at anterior hip, gluteus, GTB Stability: no gross instability  ROM: Painful ROM  AUGUSTINE + Gait: limping with weight bearing   R hip   No rashes, erythema, edema TTP at anterior hip, gluteus, GTB Stability: no gross instability ROM: Painful & limited ROM  AUGUSTINE + Gait: limping

## 2024-05-28 NOTE — HISTORY OF PRESENT ILLNESS
[FreeTextEntry1] : HISTORY OF PRESENT ILLNESS: Mr. Sanz is a 66-year-old male complaining of left hip pain. The patient has a limp when walking. The patient has had this pain for 4 years, with pain worsening over the past 8 months. Patient describes the pain as moderate, being a 10/10 on the pain scale. During the last month the pain has been nearly constant with symptoms worsening no typical pattern. Pain described as burning, sharp. Pain is increased with standing, walking, exercising. Pain is not changed with lying down, sitting, relaxing, coughing/sneezing and bowel movements. Bowel or bladder habits have not changed.  ACTIVITIES: Patient could walk 5+ blocks before the pain starts. Patient can sit 2+ hours before pain starts. Patient can stand 45 minutes before pain starts. The patient never lies down because of pain. Patient uses no assistive walking device at this time. Patient has difficulty performing household chores, socializing with friends, participating in recreational activities and exercising at this time.  PRIOR PAIN TREATMENTS: Moderate relief with injection and TENS unit. Excellent relief with physical therapy and exercise.   Prior Pain Medications: tramadol, Motrin, Baclofen, tramadol, gabapentin, Norco without relief.  TODAY, 4-: Revisit encounter.   He continues today with ongoing severe lower back pain. He feels like over the last couple of days his pain has been worse than baseline pain. He has no pain radiating into the lower extremities. Pain is made worse with transitional movements. Pain is relieved with sitting. Pain is mainly axial and associated with stiffness and spasms of the lumbar spine. He has severe pain with getting up out of a seated position. When the pain comes on the pain is sudden and debilitating. Pain is present daily and constant. He has significant limitation of his ADLS. Patient denies any bowel or bladder dysfunction, incontinence, or saddle anesthesia.  He is also presenting with severe right hip pain. Pain is made worse with weight bearing. He has pain with rotational movements as well. He has sharp, stabbing pains which can be debilitating. Pain also refers into the lateral portion of the thigh and into the groin. Pain is present daily and constant.   At his last visit, he was given a short supply of oxyCODONE-Acetaminophen 5-325 mg. He does have improvement in pain and increase in function with he does use this medication. He was given a 14-day supply, 28 tablets. He has since ran out of this medication. He does also take Ibuprofen 800 mg along with Baclofen 10 mg daily.   TODAY, 5-: Revisit encounter.   Since last visit, he underwent a Bilateral L3, L4, L5 medial branch facet block on 4-. He afforded about 75% sustained relief for about 2-3 days following this procedure. His pain then slowly started to return to baseline. Pain is above the waistband area. Pain is mainly axial and associated with stiffness. Pain is made worse with standing or walking for prolonged periods of time. There is pain when trying to get up from a seated position or with anysort of transitional movemrnts. Pain is present daily.

## 2024-05-28 NOTE — DISCUSSION/SUMMARY
[de-identified] : A discussion regarding available pain management treatment options occurred with the patient.  These included interventional, rehabilitative, pharmacological, and alternative modalities. We will proceed with the following:    Interventional treatment options: - Patient underwent an initial Bilateral L3, L4, L5 medial branch facet block which provided him with 75% sustained relief for 2-3 days. His pain then slowly started to return to baseline.  1. Proceed with a second confirmatory Bilateral L3, L4, L5 medial branch facet block with sedation.  Treatment options were discussed with the patient. The patient has been having persistent axial low back pain that has minimally improved with conservative therapy. The patient had a positive response to the first lumbar medial branch block(>75% relief for the duration of the local anesthetic) and was given the option today to proceed with an injection which could be potentially both diagnostic and therapeutic again.    If the patient has a positive response to the local anesthetic portion of the injection again and a return of pain then we can proceed with a radiofrequency ablation of the cervical medial branch nerves for potential longer-term pain relief. If the patient does not get relief we can consider other options. The risks and benefits were discussed which included bleeding, infection, nerve injury, intrathecal injection, and the side effects of steroids.  The patient has severe anxiety of procedures that necessitates monitored anesthesia care (MAC). The procedure performed will be close to major nerves, arteries, and spinal cord and/or joint structures. Due to the proximity of these structures, we need the patient to be still during the procedure.  With the help of MAC, this will be safely achieved and decrease the risk of any complications.   Medication: - Continue with Baclofen and Ibuprofen.  - Continue with Meloxicam 15 mg to be taken daily as needed.   - Follow up in 2 weeks post injection.   I Mavis Sanders attest that this documentation has been prepared under the direction and in the presence of provider Dr. Jesús Zaidi.  The documentation recorded by the scribe in my presence, accurately reflects the service I personally performed, and the decisions made by me with my edits as appropriate.   Jesús Zaidi, DO

## 2024-06-07 ENCOUNTER — EMERGENCY (EMERGENCY)
Facility: HOSPITAL | Age: 69
LOS: 0 days | Discharge: HOME | End: 2024-06-07
Attending: EMERGENCY MEDICINE
Payer: COMMERCIAL

## 2024-06-07 VITALS
WEIGHT: 220.02 LBS | OXYGEN SATURATION: 97 % | SYSTOLIC BLOOD PRESSURE: 161 MMHG | RESPIRATION RATE: 16 BRPM | HEART RATE: 71 BPM | DIASTOLIC BLOOD PRESSURE: 81 MMHG | TEMPERATURE: 98 F

## 2024-06-07 DIAGNOSIS — S80.11XA CONTUSION OF RIGHT LOWER LEG, INITIAL ENCOUNTER: ICD-10-CM

## 2024-06-07 DIAGNOSIS — I10 ESSENTIAL (PRIMARY) HYPERTENSION: ICD-10-CM

## 2024-06-07 DIAGNOSIS — L03.115 CELLULITIS OF RIGHT LOWER LIMB: ICD-10-CM

## 2024-06-07 DIAGNOSIS — S90.31XA CONTUSION OF RIGHT FOOT, INITIAL ENCOUNTER: ICD-10-CM

## 2024-06-07 DIAGNOSIS — Z88.0 ALLERGY STATUS TO PENICILLIN: ICD-10-CM
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
## 2024-06-07 DIAGNOSIS — Z98.890 OTHER SPECIFIED POSTPROCEDURAL STATES: Chronic | ICD-10-CM
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
## 2024-06-07 DIAGNOSIS — E78.5 HYPERLIPIDEMIA, UNSPECIFIED: ICD-10-CM
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
## 2024-06-07 PROCEDURE — 93970 EXTREMITY STUDY: CPT | Mod: 26

## 2024-06-07 PROCEDURE — 73630 X-RAY EXAM OF FOOT: CPT | Mod: 26,RT

## 2024-06-07 PROCEDURE — 73564 X-RAY EXAM KNEE 4 OR MORE: CPT | Mod: 26,RT

## 2024-06-07 PROCEDURE — 73610 X-RAY EXAM OF ANKLE: CPT | Mod: RT

## 2024-06-07 PROCEDURE — 73630 X-RAY EXAM OF FOOT: CPT | Mod: RT

## 2024-06-07 PROCEDURE — 99284 EMERGENCY DEPT VISIT MOD MDM: CPT | Mod: 25

## 2024-06-07 PROCEDURE — 93970 EXTREMITY STUDY: CPT

## 2024-06-07 PROCEDURE — 73590 X-RAY EXAM OF LOWER LEG: CPT | Mod: 26,RT

## 2024-06-07 PROCEDURE — 73590 X-RAY EXAM OF LOWER LEG: CPT | Mod: RT

## 2024-06-07 PROCEDURE — 73564 X-RAY EXAM KNEE 4 OR MORE: CPT | Mod: RT

## 2024-06-07 PROCEDURE — 99284 EMERGENCY DEPT VISIT MOD MDM: CPT

## 2024-06-07 PROCEDURE — 73610 X-RAY EXAM OF ANKLE: CPT | Mod: 26,RT

## 2024-06-07 NOTE — ED PROVIDER NOTE - PATIENT PORTAL LINK FT
You can access the FollowMyHealth Patient Portal offered by [facility] by registering at the following website: [email or web address]. By joining [facility]’s FollowMyHealth portal, you will also be able to view your health information using other applications (apps) compatible with our system.

## 2024-06-07 NOTE — ED PROVIDER NOTE - NSFOLLOWUPINSTRUCTIONS_ED_ALL_ED_FT
Antibiotics were sent to your pharmacy - take as prescribed.    Follow-up with your PCP in 1-3 days.    Cellulitis    Cellulitis is a skin infection caused by bacteria. This condition occurs most often in the arms and lower legs but can occur anywhere over the body. Symptoms include redness, swelling, warm skin, tenderness, and chills/fever. If you were prescribed an antibiotic medicine, take it as told by your health care provider. Do not stop taking the antibiotic even if you start to feel better.    SEEK IMMEDIATE MEDICAL CARE IF YOU HAVE ANY OF THE FOLLOWING SYMPTOMS: worsening fever, red streaks coming from affected area, vomiting or diarrhea, or dizziness/lightheadedness.

## 2024-06-07 NOTE — ED PROVIDER NOTE - CLINICAL SUMMARY MEDICAL DECISION MAKING FREE TEXT BOX
Patient presents with pain and swelling and bruising to the right lower extremity.  On exam found to have significant ecchymosis and edema.  Also noted to have some erythema and warmth to that area.  X-rays negative for fracture.  Duplex negative for DVT.  Antibiotics given for likely cellulitis.

## 2024-06-07 NOTE — ED PROVIDER NOTE - EKG/XRAY ADDITIONAL INFORMATION
ED xray film prelim, my independent interpretation - Dr. Gandhi: [No fracture or dislocation, no foreign body]

## 2024-06-07 NOTE — ED PROVIDER NOTE - ATTENDING APP SHARED VISIT CONTRIBUTION OF CARE
68-year-old male past med history of hypertension, hyperlipidemia, hypertension presents with injury to right lower extremity.  Patient states that 5 days ago he was at work when notes that a cabinet fell and landed onto his right lower extremity.  At that time felt some pain but was still ambulating afterwards not think much of it.  States that since the pain in bruising has been worsening so went to his primary care physician who was not concerned for any bony injuries.  States that this morning the swelling and bruising worsened so came in for evaluation.  No numbness tingling or weakness.  Still able to ambulate on it.  No fevers or chills.    CONSTITUTIONAL: Well-developed; well-nourished; in no acute distress.   SKIN: warm, dry.  Positive extensive ecchymosis to the right lower extremity below the knee.  Also found to have erythema and warmth to the shin and calf.  HEAD: Normocephalic; atraumatic.  EYES: PERRL, EOMI, no conjunctival erythema  ENT: No nasal discharge; airway clear.  EXT: Normal ROM.  5/5 strength in all 4 extremities.  Positive edema and tenderness to the right lower extremity to the shin and calf.  Neurovascular intact.  LYMPH: No acute cervical adenopathy.  NEURO: Alert, oriented, grossly unremarkable. neurovascularly intact  PSYCH: Cooperative, appropriate.

## 2024-06-07 NOTE — ED PROVIDER NOTE - PHYSICAL EXAMINATION
Vital Signs: I have reviewed the initial vital signs.  Constitutional: appears stated age, no acute distress  Eyes: Sclera clear, EOMI.  Cardiovascular: S1 and S2, regular rate, regular rhythm, well-perfused extremities, radial pulses equal and 2+, pedal pulses 2+ and equal  Respiratory: unlabored respiratory effort, clear to auscultation bilaterally no wheezing, rales, or rhonchi  Gastrointestinal:  abdomen soft, non-tender  Musculoskeletal: supple neck, + mild swelling and erythema to distal right lower leg. + ecchymosis to right lateral lower leg and right foot. full range of motion 5/5 strength and sensation intact to right ankle. No bony tenderness to right knee, right lower leg, right ankle, or right foot.   Integumentary: warm, dry, no rash  Neurologic: awake, alert, oriented x3, extremities’ motor and sensory functions grossly intact

## 2024-06-07 NOTE — ED PROVIDER NOTE - OBJECTIVE STATEMENT
68-year-old male past medical history HTN, HLD presents with complaint of right lower extremity bruising.  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  States since then he has been able to ambulate but noticed increasing ecchymosis over the right lower leg and right lateral foot.  States this morning he woke up and noted swelling to the right lower leg as well as mild erythema to the distal aspect of the right lower leg, prompting emergency department evaluation.  Denies fever/chills, lightheadedness/dizziness, chest pain, shortness of breath, gait disturbance, abdominal pain, change in bowel/bladder habits, focal numbness/weakness.

## 2024-06-07 NOTE — ED ADULT NURSE NOTE - NSFALLUNIVINTERV_ED_ALL_ED
Bed/Stretcher in lowest position, wheels locked, appropriate side rails in place/Call bell, personal items and telephone in reach/Instruct patient to call for assistance before getting out of bed/chair/stretcher/Non-slip footwear applied when patient is off stretcher/[place] to call system/Physically safe environment - no spills, clutter or unnecessary equipment/Purposeful proactive rounding/Room/bathroom lighting operational, light cord in reach

## 2024-06-10 ENCOUNTER — INPATIENT (INPATIENT)
Facility: HOSPITAL | Age: 69
LOS: 3 days | Discharge: HOME | DRG: 603 | End: 2024-06-14
Attending: HOSPITALIST | Admitting: HOSPITALIST
Payer: COMMERCIAL

## 2024-06-10 VITALS
TEMPERATURE: 98 F | OXYGEN SATURATION: 96 % | DIASTOLIC BLOOD PRESSURE: 74 MMHG | RESPIRATION RATE: 19 BRPM | SYSTOLIC BLOOD PRESSURE: 154 MMHG | HEART RATE: 52 BPM

## 2024-06-10 DIAGNOSIS — M54.9 DORSALGIA, UNSPECIFIED: ICD-10-CM

## 2024-06-10 DIAGNOSIS — Z98.890 OTHER SPECIFIED POSTPROCEDURAL STATES: Chronic | ICD-10-CM

## 2024-06-10 DIAGNOSIS — Z96.652 PRESENCE OF LEFT ARTIFICIAL KNEE JOINT: Chronic | ICD-10-CM

## 2024-06-10 DIAGNOSIS — E78.5 HYPERLIPIDEMIA, UNSPECIFIED: ICD-10-CM

## 2024-06-10 DIAGNOSIS — L03.90 CELLULITIS, UNSPECIFIED: ICD-10-CM

## 2024-06-10 DIAGNOSIS — M10.9 GOUT, UNSPECIFIED: ICD-10-CM

## 2024-06-10 DIAGNOSIS — Z87.438 PERSONAL HISTORY OF OTHER DISEASES OF MALE GENITAL ORGANS: ICD-10-CM

## 2024-06-10 DIAGNOSIS — I10 ESSENTIAL (PRIMARY) HYPERTENSION: ICD-10-CM

## 2024-06-10 LAB
ALBUMIN SERPL ELPH-MCNC: 4.4 G/DL — SIGNIFICANT CHANGE UP (ref 3.5–5.2)
ALP SERPL-CCNC: 58 U/L — SIGNIFICANT CHANGE UP (ref 30–115)
ALT FLD-CCNC: 26 U/L — SIGNIFICANT CHANGE UP (ref 0–41)
ANION GAP SERPL CALC-SCNC: 9 MMOL/L — SIGNIFICANT CHANGE UP (ref 7–14)
AST SERPL-CCNC: 48 U/L — HIGH (ref 0–41)
BASOPHILS # BLD AUTO: 0.06 K/UL — SIGNIFICANT CHANGE UP (ref 0–0.2)
BASOPHILS NFR BLD AUTO: 0.8 % — SIGNIFICANT CHANGE UP (ref 0–1)
BILIRUB SERPL-MCNC: 2.1 MG/DL — HIGH (ref 0.2–1.2)
BUN SERPL-MCNC: 25 MG/DL — HIGH (ref 10–20)
CALCIUM SERPL-MCNC: 9.5 MG/DL — SIGNIFICANT CHANGE UP (ref 8.4–10.5)
CHLORIDE SERPL-SCNC: 103 MMOL/L — SIGNIFICANT CHANGE UP (ref 98–110)
CO2 SERPL-SCNC: 28 MMOL/L — SIGNIFICANT CHANGE UP (ref 17–32)
CREAT SERPL-MCNC: 1 MG/DL — SIGNIFICANT CHANGE UP (ref 0.7–1.5)
EGFR: 83 ML/MIN/1.73M2 — SIGNIFICANT CHANGE UP
EOSINOPHIL # BLD AUTO: 0.42 K/UL — SIGNIFICANT CHANGE UP (ref 0–0.7)
EOSINOPHIL NFR BLD AUTO: 5.8 % — SIGNIFICANT CHANGE UP (ref 0–8)
GLUCOSE SERPL-MCNC: 123 MG/DL — HIGH (ref 70–99)
HCT VFR BLD CALC: 38.6 % — LOW (ref 42–52)
HCT VFR BLD CALC: 42.5 % — SIGNIFICANT CHANGE UP (ref 42–52)
HGB BLD-MCNC: 13.2 G/DL — LOW (ref 14–18)
HGB BLD-MCNC: 14.6 G/DL — SIGNIFICANT CHANGE UP (ref 14–18)
IMM GRANULOCYTES NFR BLD AUTO: 0.4 % — HIGH (ref 0.1–0.3)
LACTATE SERPL-SCNC: 1.1 MMOL/L — SIGNIFICANT CHANGE UP (ref 0.7–2)
LYMPHOCYTES # BLD AUTO: 1.11 K/UL — LOW (ref 1.2–3.4)
LYMPHOCYTES # BLD AUTO: 15.3 % — LOW (ref 20.5–51.1)
MCHC RBC-ENTMCNC: 32.8 PG — HIGH (ref 27–31)
MCHC RBC-ENTMCNC: 33 PG — HIGH (ref 27–31)
MCHC RBC-ENTMCNC: 34.2 G/DL — SIGNIFICANT CHANGE UP (ref 32–37)
MCHC RBC-ENTMCNC: 34.4 G/DL — SIGNIFICANT CHANGE UP (ref 32–37)
MCV RBC AUTO: 95.9 FL — HIGH (ref 80–94)
MCV RBC AUTO: 96 FL — HIGH (ref 80–94)
MONOCYTES # BLD AUTO: 0.52 K/UL — SIGNIFICANT CHANGE UP (ref 0.1–0.6)
MONOCYTES NFR BLD AUTO: 7.2 % — SIGNIFICANT CHANGE UP (ref 1.7–9.3)
NEUTROPHILS # BLD AUTO: 5.1 K/UL — SIGNIFICANT CHANGE UP (ref 1.4–6.5)
NEUTROPHILS NFR BLD AUTO: 70.5 % — SIGNIFICANT CHANGE UP (ref 42.2–75.2)
NRBC # BLD: 0 /100 WBCS — SIGNIFICANT CHANGE UP (ref 0–0)
NRBC # BLD: 0 /100 WBCS — SIGNIFICANT CHANGE UP (ref 0–0)
PLATELET # BLD AUTO: 120 K/UL — LOW (ref 130–400)
PLATELET # BLD AUTO: 142 K/UL — SIGNIFICANT CHANGE UP (ref 130–400)
PMV BLD: 10.8 FL — HIGH (ref 7.4–10.4)
PMV BLD: 11.2 FL — HIGH (ref 7.4–10.4)
POTASSIUM SERPL-MCNC: 4.8 MMOL/L — SIGNIFICANT CHANGE UP (ref 3.5–5)
POTASSIUM SERPL-SCNC: 4.8 MMOL/L — SIGNIFICANT CHANGE UP (ref 3.5–5)
PROT SERPL-MCNC: 6.9 G/DL — SIGNIFICANT CHANGE UP (ref 6–8)
RBC # BLD: 4.02 M/UL — LOW (ref 4.7–6.1)
RBC # BLD: 4.43 M/UL — LOW (ref 4.7–6.1)
RBC # FLD: 12.9 % — SIGNIFICANT CHANGE UP (ref 11.5–14.5)
RBC # FLD: 12.9 % — SIGNIFICANT CHANGE UP (ref 11.5–14.5)
SODIUM SERPL-SCNC: 140 MMOL/L — SIGNIFICANT CHANGE UP (ref 135–146)
WBC # BLD: 6.87 K/UL — SIGNIFICANT CHANGE UP (ref 4.8–10.8)
WBC # BLD: 7.24 K/UL — SIGNIFICANT CHANGE UP (ref 4.8–10.8)
WBC # FLD AUTO: 6.87 K/UL — SIGNIFICANT CHANGE UP (ref 4.8–10.8)
WBC # FLD AUTO: 7.24 K/UL — SIGNIFICANT CHANGE UP (ref 4.8–10.8)

## 2024-06-10 PROCEDURE — 73564 X-RAY EXAM KNEE 4 OR MORE: CPT | Mod: 26,RT

## 2024-06-10 PROCEDURE — 82550 ASSAY OF CK (CPK): CPT

## 2024-06-10 PROCEDURE — 83735 ASSAY OF MAGNESIUM: CPT

## 2024-06-10 PROCEDURE — 85025 COMPLETE CBC W/AUTO DIFF WBC: CPT

## 2024-06-10 PROCEDURE — 99223 1ST HOSP IP/OBS HIGH 75: CPT

## 2024-06-10 PROCEDURE — 80048 BASIC METABOLIC PNL TOTAL CA: CPT

## 2024-06-10 PROCEDURE — 99222 1ST HOSP IP/OBS MODERATE 55: CPT

## 2024-06-10 PROCEDURE — 36415 COLL VENOUS BLD VENIPUNCTURE: CPT

## 2024-06-10 PROCEDURE — 99285 EMERGENCY DEPT VISIT HI MDM: CPT

## 2024-06-10 PROCEDURE — 73590 X-RAY EXAM OF LOWER LEG: CPT | Mod: 26,RT

## 2024-06-10 PROCEDURE — 80053 COMPREHEN METABOLIC PANEL: CPT

## 2024-06-10 PROCEDURE — 94660 CPAP INITIATION&MGMT: CPT

## 2024-06-10 PROCEDURE — 83605 ASSAY OF LACTIC ACID: CPT

## 2024-06-10 PROCEDURE — 73701 CT LOWER EXTREMITY W/DYE: CPT | Mod: 26,RT

## 2024-06-10 PROCEDURE — 93970 EXTREMITY STUDY: CPT | Mod: 26

## 2024-06-10 PROCEDURE — 85027 COMPLETE CBC AUTOMATED: CPT

## 2024-06-10 PROCEDURE — 73701 CT LOWER EXTREMITY W/DYE: CPT | Mod: MC,RT

## 2024-06-10 RX ORDER — TRAMADOL HYDROCHLORIDE 50 MG/1
25 TABLET ORAL EVERY 6 HOURS
Refills: 0 | Status: DISCONTINUED | OUTPATIENT
Start: 2024-06-10 | End: 2024-06-13

## 2024-06-10 RX ORDER — ATORVASTATIN CALCIUM 80 MG/1
1 TABLET, FILM COATED ORAL
Refills: 0 | DISCHARGE

## 2024-06-10 RX ORDER — BACLOFEN 100 %
5 POWDER (GRAM) MISCELLANEOUS EVERY 8 HOURS
Refills: 0 | Status: DISCONTINUED | OUTPATIENT
Start: 2024-06-10 | End: 2024-06-14

## 2024-06-10 RX ORDER — IBUPROFEN 200 MG
800 TABLET ORAL
Refills: 0 | Status: DISCONTINUED | OUTPATIENT
Start: 2024-06-10 | End: 2024-06-14

## 2024-06-10 RX ORDER — CEFAZOLIN SODIUM 1 G
2000 VIAL (EA) INJECTION ONCE
Refills: 0 | Status: COMPLETED | OUTPATIENT
Start: 2024-06-10 | End: 2024-06-10

## 2024-06-10 RX ORDER — ACETAMINOPHEN 500 MG
325 TABLET ORAL EVERY 4 HOURS
Refills: 0 | Status: DISCONTINUED | OUTPATIENT
Start: 2024-06-10 | End: 2024-06-14

## 2024-06-10 RX ORDER — TAMSULOSIN HYDROCHLORIDE 0.4 MG/1
1 CAPSULE ORAL
Refills: 0 | DISCHARGE

## 2024-06-10 RX ORDER — SODIUM CHLORIDE 9 MG/ML
1000 INJECTION, SOLUTION INTRAVENOUS
Refills: 0 | Status: DISCONTINUED | OUTPATIENT
Start: 2024-06-10 | End: 2024-06-11

## 2024-06-10 RX ORDER — MORPHINE SULFATE 50 MG/1
4 CAPSULE, EXTENDED RELEASE ORAL ONCE
Refills: 0 | Status: DISCONTINUED | OUTPATIENT
Start: 2024-06-10 | End: 2024-06-10

## 2024-06-10 RX ORDER — ATORVASTATIN CALCIUM 80 MG/1
10 TABLET, FILM COATED ORAL AT BEDTIME
Refills: 0 | Status: DISCONTINUED | OUTPATIENT
Start: 2024-06-10 | End: 2024-06-14

## 2024-06-10 RX ORDER — LOSARTAN POTASSIUM 100 MG/1
100 TABLET, FILM COATED ORAL DAILY
Refills: 0 | Status: DISCONTINUED | OUTPATIENT
Start: 2024-06-10 | End: 2024-06-14

## 2024-06-10 RX ORDER — SODIUM CHLORIDE 9 MG/ML
1000 INJECTION, SOLUTION INTRAVENOUS ONCE
Refills: 0 | Status: COMPLETED | OUTPATIENT
Start: 2024-06-10 | End: 2024-06-10

## 2024-06-10 RX ORDER — LOSARTAN/HYDROCHLOROTHIAZIDE 100MG-25MG
1 TABLET ORAL
Refills: 0 | DISCHARGE

## 2024-06-10 RX ORDER — IBUPROFEN 200 MG
2 TABLET ORAL
Refills: 0 | DISCHARGE

## 2024-06-10 RX ORDER — BACLOFEN 100 %
1 POWDER (GRAM) MISCELLANEOUS
Refills: 0 | DISCHARGE

## 2024-06-10 RX ORDER — ENOXAPARIN SODIUM 100 MG/ML
40 INJECTION SUBCUTANEOUS EVERY 24 HOURS
Refills: 0 | Status: DISCONTINUED | OUTPATIENT
Start: 2024-06-10 | End: 2024-06-14

## 2024-06-10 RX ORDER — ALLOPURINOL 300 MG
100 TABLET ORAL DAILY
Refills: 0 | Status: DISCONTINUED | OUTPATIENT
Start: 2024-06-10 | End: 2024-06-14

## 2024-06-10 RX ORDER — CEFAZOLIN SODIUM 1 G
1000 VIAL (EA) INJECTION EVERY 8 HOURS
Refills: 0 | Status: DISCONTINUED | OUTPATIENT
Start: 2024-06-10 | End: 2024-06-11

## 2024-06-10 RX ORDER — TAMSULOSIN HYDROCHLORIDE 0.4 MG/1
0.4 CAPSULE ORAL AT BEDTIME
Refills: 0 | Status: DISCONTINUED | OUTPATIENT
Start: 2024-06-10 | End: 2024-06-14

## 2024-06-10 RX ADMIN — Medication 100 MILLIGRAM(S): at 10:13

## 2024-06-10 RX ADMIN — SODIUM CHLORIDE 50 MILLILITER(S): 9 INJECTION, SOLUTION INTRAVENOUS at 18:26

## 2024-06-10 RX ADMIN — MORPHINE SULFATE 4 MILLIGRAM(S): 50 CAPSULE, EXTENDED RELEASE ORAL at 10:14

## 2024-06-10 RX ADMIN — ATORVASTATIN CALCIUM 10 MILLIGRAM(S): 80 TABLET, FILM COATED ORAL at 21:28

## 2024-06-10 RX ADMIN — LOSARTAN POTASSIUM 100 MILLIGRAM(S): 100 TABLET, FILM COATED ORAL at 18:24

## 2024-06-10 RX ADMIN — SODIUM CHLORIDE 1000 MILLILITER(S): 9 INJECTION, SOLUTION INTRAVENOUS at 10:13

## 2024-06-10 RX ADMIN — Medication 100 MILLIGRAM(S): at 18:24

## 2024-06-10 RX ADMIN — Medication 100 MILLIGRAM(S): at 14:01

## 2024-06-10 RX ADMIN — TAMSULOSIN HYDROCHLORIDE 0.4 MILLIGRAM(S): 0.4 CAPSULE ORAL at 21:28

## 2024-06-10 NOTE — H&P ADULT - ASSESSMENT
69 y/o male:  PMH:  HTN, HLD, Gout, Chronic pain due to herniated lumbar disc. Patient is currently taking doxycycline since June 7th/2024, presents to ER today due to no improvement in right lower extremity cellulites  67 y/o male:  PMH:  HTN, HLD, Gout, Chronic pain due to herniated lumbar disc. Patient is currently taking doxycycline since June 7th/2024, presents to ER today due to no improvement in right lower extremity cellulites     Case discussed with Dr Cavanaugh

## 2024-06-10 NOTE — ED PROVIDER NOTE - CLINICAL SUMMARY MEDICAL DECISION MAKING FREE TEXT BOX
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 antibiotics however has worsened with worsening erythema worsening drainage denies any headache visual changes chest pain shortness of breath diaphoresis denies any evidence of neurovascular compromise patient is walking with a cane secondary to pain    On physical exam patient is normocephalic atraumatic pupils equal round reactive light accommodation extraocular muscles intact oropharynx clear chest clear to auscultation bilaterally patient has right knee erythema right knee edema distal to the knee with discharge present pedal pulses 2+ capillary refills normal patient is able to flex and extend toes flex and extend ankle flex and extend knee we obtained x-rays per my depend evaluation Laxacin with foreign body not consistent with fractures in addition we obtained duplex we initiated IV antibiotics this patient is a good presentation is not consistent with sepsis at this time however he does have infection we initiated IV antibiotics considering failed outpatient therapy with worsening clinical exam I will admit for further evaluation patient updated and agrees with plan of care

## 2024-06-10 NOTE — PATIENT PROFILE ADULT - CAREGIVER PHONE NUMBER
[FreeTextEntry1] : The patient is a 69-year-old female with a history of a  shunt as well as hypertension.  The patient is status post hysterectomy for an ovarian process which was benign.  Ondina notices a mild right lower quadrant discomfort and the recent CAT scan did not reveal any significant pathology.  This may be on the basis of residual scar tissue or indeed may be radicular in nature.  Patient was told to resume a high potency probiotic and soluble fiber supplements.  If the symptoms persist, we may consider using a low-dose antispasmodic.
[phone]

## 2024-06-10 NOTE — H&P ADULT - NSHPPHYSICALEXAM_GEN_ALL_CORE
Vital Signs Last 24 Hrs    T(F): 97.7 (06-10-24 @ 09:32), Max: 97.7 (06-10-24 @ 09:32)  HR: 52 (06-10-24 @ 09:32) (52 - 52)  BP: 154/74 (06-10-24 @ 09:32)  RR: 19 (06-10-24 @ 09:32) (19 - 19)  SpO2: 96% (06-10-24 @ 09:32) (96% - 96%)    PHYSICAL EXAM:      Constitutional: NAD, A&O x3    Eyes: PERRLA    Respiratory: +air entry, no rales, no rhonchi, no wheezes    Cardiovascular: +S1 and S2, regular rate and rhythm    Gastrointestinal: +BS, soft, non-tender, not distended    Extremities:  **right lower leg has erythema, edema, increased warm compared to left lower leg, firm to touch, tender to touch**    Vascular: +dorsal pedis and radial pulses, no extremity cyanosis    Neurological: sensation intact, ROM equal B/L, CN II-XII intact    Skin: **right lower leg has erythema, edema, increased warm compared to left lower leg, firm to touch, tender to touch**    Lymph Nodes: no lymphadenopathy

## 2024-06-10 NOTE — CHART NOTE - NSCHARTNOTEFT_GEN_A_CORE
Still awaiting CAT scan of arm  reading Still awaiting CAT scan of affected extremity  reading (seen by surgery) Still awaiting CAT scan of affected extremity  reading (seen by surgery) LA of 1.1 is reassuring

## 2024-06-10 NOTE — H&P ADULT - NS ATTEND AMEND GEN_ALL_CORE FT
67 y/o male:  PMH:  HTN, HLD, Gout, Chronic pain due to herniated lumbar disc. Patient is currently taking doxycycline since June 7th/2024, presents to ER today due to no improvement in right lower extremity cellulites     # RLE trauma and cellulitis   - not improving after being discharged from emergency room on doxycycline   - no sepsis   - duplex venous 6/7 neg , XRAY 6/7 neg   - repeat venous duplex 6/10 - fu   -  Xray Tibia + Fibula 2 Views, Right (06.10.24 @ 10:18) >No evidence of acute fracture or dislocation. No definite subcutaneous   gas.  - Xray Knee 4 Views, Right (06.10.24 @ 10:18) >No evidence of acute fracture or dislocation. No gross joint effusion. Moderate to severe tricompartmental degenerative changes. Vascular calcification.  - fu blood clx   - Ancef   - ID EVAL     # H/O HTN    # H/O HLD     # H/O Gout     # Chronic Back pain w/ herniated disk - on motrin     dvt/ gi ppx/diet 67 y/o male:  PMH:  HTN, HLD, Gout, Chronic pain due to herniated lumbar disc. Patient is currently taking doxycycline since June 7th/2024, presents to ER today due to no improvement in right lower extremity cellulites     # RLE cellulitis   # R/O Compartment Syndrome   - not improving after being discharged from emergency room on doxycycline   - significant RLE edema with tenderness out of proportion to erythema   - no sepsis   - duplex venous 6/7 neg , XRAY 6/7 neg   - repeat venous duplex 6/10 - fu   -  Xray Tibia + Fibula 2 Views, Right (06.10.24 @ 10:18) >No evidence of acute fracture or dislocation. No definite subcutaneous   gas.  - Xray Knee 4 Views, Right (06.10.24 @ 10:18) >No evidence of acute fracture or dislocation. No gross joint effusion. Moderate to severe tricompartmental degenerative changes. Vascular calcification.  - fu blood clx   - fu CT RLE   - SX Eval   - check LA   - Ancef   - ID EVAL     # H/O HTN - cw home medications    # H/O HLD - cw home medications     # H/O Gout - cw home medications     # Chronic Back pain w/ herniated disk - on motrin     dvt/ gi ppx/diet

## 2024-06-10 NOTE — H&P ADULT - NSHPSOCIALHISTORY_GEN_ALL_CORE
Tobacco use: no  EtOH use :no   Illicit drug use: no  Marital Status:      ambulation status: using cane to get up from sitting position

## 2024-06-10 NOTE — ED PROVIDER NOTE - PHYSICAL EXAMINATION
VITAL SIGNS: I have reviewed nursing notes and confirm.  CONSTITUTIONAL: non-toxic, well appearing  SKIN: no rash, no petechiae.  EYES: pink conjunctiva, anicteric  ENT: tongue midline, no exudates, MMM  NECK: Supple; no meningismus  CARD: RRR, no murmurs, equal radial pulses bilaterally 2+  RESP: CTAB, no respiratory distress  EXT: no hip or pelvic pain, nvi, + R knee w edema to lateral aspect and ttp, + R anterior lower leg with erythema, warmth, edema, and ttp, no crepitus, no vesicles   NEURO: Alert, oriented. no focal deficits

## 2024-06-10 NOTE — H&P ADULT - NSICDXPASTMEDICALHX_GEN_ALL_CORE_FT
PAST MEDICAL HISTORY:  Chronic back pain     Gout     History of BPH     HLD (hyperlipidemia)     HTN (hypertension)

## 2024-06-10 NOTE — CONSULT NOTE ADULT - SUBJECTIVE AND OBJECTIVE BOX
HPI: 67yo male with PMHx HTN, gout, HLD, BPH presents with RLE cellulitis. Pt had a counter top fall on him about 2 weeks ago at work. He began having pain and redness about 1 week ago. Pt was being treated with antibx and felt it was not improving. Pt states pain with intially placing foot on floor is excruciating but then improves as he is walking. Pt states the color has changed to a light red from a darker color. Denies fever/chills.         PAST MEDICAL & SURGICAL HISTORY:  HTN (hypertension)      Gout      HLD (hyperlipidemia)      History of BPH      Chronic back pain      H/O inguinal hernia repair      H/O total knee replacement, left          MEDICATIONS  (STANDING):  allopurinol 100 milliGRAM(s) Oral daily  atorvastatin 10 milliGRAM(s) Oral at bedtime  ceFAZolin   IVPB 1000 milliGRAM(s) IV Intermittent every 8 hours  enoxaparin Injectable 40 milliGRAM(s) SubCutaneous every 24 hours  hydrochlorothiazide 25 milliGRAM(s) Oral daily  lactated ringers. 1000 milliLiter(s) (50 mL/Hr) IV Continuous <Continuous>  losartan 100 milliGRAM(s) Oral daily  tamsulosin 0.4 milliGRAM(s) Oral at bedtime    MEDICATIONS  (PRN):  acetaminophen     Tablet .. 325 milliGRAM(s) Oral every 4 hours PRN Mild Pain (1 - 3)  baclofen 5 milliGRAM(s) Oral every 8 hours PRN for muscle spasm  ibuprofen  Tablet. 800 milliGRAM(s) Oral two times a day PRN Moderate Pain (4 - 6)  oxycodone    5 mG/acetaminophen 325 mG 1 Tablet(s) Oral every 6 hours PRN Severe Pain (7 - 10)  traMADol 25 milliGRAM(s) Oral every 6 hours PRN Moderate Pain (4 - 6)      Allergies    penicillins (Rash)    Intolerances        SOCIAL HISTORY:    FAMILY HISTORY:  FH: heart disease (Father)            Physical Exam:  General: NAD, resting comfortably  HEENT: NC/AT, EOMI, normal hearing, no oral lesions, no LAD, neck supple  Pulmonary: normal resp effort, CTA-B  Cardiovascular: NSR, no murmurs  Abdominal: soft, ND/NT, no organomegaly  Extremities: WWP, normal strength, RLE with mild erythema +ecchymosis in calf area and around perimeter of foot and over toes, PT pulse intact, DP difficult to palpate 2/2 edema, sensation intact, + passive FROM of foot, lower leg with mild erythema and warmth +++tenderness especially around calf and medial aspect of lower leg  Neuro: A/O x 3, CNs II-XII grossly intact, normal sensation, no focal deficits  Pulses: palpable distal pulses    Vital Signs Last 24 Hrs  T(C): 36.4 (11 Jun 2024 04:46), Max: 36.6 (10 Cornelius 2024 18:18)  T(F): 97.5 (11 Jun 2024 04:46), Max: 97.9 (10 Cornelius 2024 18:18)  HR: 53 (11 Jun 2024 04:46) (51 - 53)  BP: 135/66 (11 Jun 2024 04:46) (135/66 - 186/79)  BP(mean): --  RR: 18 (11 Jun 2024 04:46) (18 - 19)  SpO2: 97% (11 Jun 2024 04:46) (96% - 97%)    Parameters below as of 11 Jun 2024 04:46  Patient On (Oxygen Delivery Method): room air        I&O's Summary          LABS:                        13.2   6.87  )-----------( 120      ( 10 Cornelius 2024 15:15 )             38.6     06-10    140  |  103  |  25<H>  ----------------------------<  123<H>  4.8   |  28  |  1.0    Ca    9.5      10 Cornelius 2024 10:02    TPro  6.9  /  Alb  4.4  /  TBili  2.1<H>  /  DBili  x   /  AST  48<H>  /  ALT  26  /  AlkPhos  58  06-10      Urinalysis Basic - ( 10 Cornelius 2024 10:02 )    Color: x / Appearance: x / SG: x / pH: x  Gluc: 123 mg/dL / Ketone: x  / Bili: x / Urobili: x   Blood: x / Protein: x / Nitrite: x   Leuk Esterase: x / RBC: x / WBC x   Sq Epi: x / Non Sq Epi: x / Bacteria: x      CAPILLARY BLOOD GLUCOSE        LIVER FUNCTIONS - ( 10 Cornelius 2024 10:02 )  Alb: 4.4 g/dL / Pro: 6.9 g/dL / ALK PHOS: 58 U/L / ALT: 26 U/L / AST: 48 U/L / GGT: x             Cultures:      RADIOLOGY & ADDITIONAL STUDIES:  CT pend

## 2024-06-10 NOTE — H&P ADULT - HISTORY OF PRESENT ILLNESS
67 y/o male:  PMH:  HTN, HLD, Gout, Chronic pain due to herniated lumbar disc.  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 Patient has been complaint with his antibiotic   - Patient presents to Er today due to continued swelling with worsening erythema and pain  - patient denies fever, chills,  - patient uses cane to get up from a sitting position

## 2024-06-10 NOTE — ED PROVIDER NOTE - ATTENDING CONTRIBUTION TO CARE
I have personally performed a history and physical exam on this patient and personally directed the management of the patient. 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 antibiotics however has worsened with worsening erythema worsening drainage denies any headache visual changes chest pain shortness of breath diaphoresis denies any evidence of neurovascular compromise patient is walking with a cane secondary to pain    On physical exam patient is normocephalic atraumatic pupils equal round reactive light accommodation extraocular muscles intact oropharynx clear chest clear to auscultation bilaterally patient has right knee erythema right knee edema distal to the knee with discharge present pedal pulses 2+ capillary refills normal patient is able to flex and extend toes flex and extend ankle flex and extend knee we obtained x-rays per my depend evaluation Laxacin with foreign body not consistent with fractures in addition we obtained duplex we initiated IV antibiotics this patient is a good presentation is not consistent with sepsis at this time however he does have infection we initiated IV antibiotics considering failed outpatient therapy with worsening clinical exam I will admit for further evaluation patient updated and agrees with plan of care

## 2024-06-10 NOTE — CONSULT NOTE ADULT - NSCONSULTADDITIONALINFOA_GEN_ALL_CORE
I saw and examined the patient and I agree with the above assesment and plan for CT with IV contrast. I have low suspicion for true compartment syndrome. Will follow.

## 2024-06-10 NOTE — H&P ADULT - PROBLEM SELECTOR PLAN 1
** Right lower leg ** Right lower leg  continue Ancef 1gn IV Q8h  follow fever curve and WBC count  pain control with Tylenol, Percocet, and Tramadol  ID consult  OOB to chair with assistance  VTE prophylaxis ** Right lower leg  continue Ancef 1gn IV Q8h  follow fever curve and WBC count  pain control with Tylenol, Percocet, and Tramadol  ID consult  OOB to chair with assistance  VTE prophylaxis  follow up blood culture  follow up repeat Venous duplex of right leg

## 2024-06-10 NOTE — H&P ADULT - NSHPLABSRESULTS_GEN_ALL_CORE
14.6   7.24  )-----------( 142      ( 10 Cornelius 2024 10:02 )             42.5       06-10    140  |  103  |  25<H>  ----------------------------<  123<H>  4.8   |  28  |  1.0    Ca    9.5      10 Cornelius 2024 10:02    TPro  6.9  /  Alb  4.4  /  TBili  2.1<H>  /  DBili  x   /  AST  48<H>  /  ALT  26  /  AlkPhos  58  06-10      Urinalysis Basic - ( 10 Cornelius 2024 10:02 )    Color: x / Appearance: x / SG: x / pH: x  Gluc: 123 mg/dL / Ketone: x  / Bili: x / Urobili: x   Blood: x / Protein: x / Nitrite: x   Leuk Esterase: x / RBC: x / WBC x   Sq Epi: x / Non Sq Epi: x / Bacteria: x  < from: Xray Tibia + Fibula 2 Views, Right (06.10.24 @ 10:18) >    impression:    No evidence of acute fracture or dislocation. No definite subcutaneous   gas.    Degenerative osseous changes and vascular calcification.    : Xray Knee 4 Views, Right (06.10.24 @ 10:18) >    impression:    No evidence of acute fracture or dislocation. No gross joint effusion.    Moderate to severe tricompartmental degenerative changes. Vascular   calcification.       VA Duplex Lower Ext Vein Scan, Bilat (06.07.24 @ 15:45) >    Impression:    No evidence of deep venous thrombosis or superficial thrombophlebitis in   the bilateral lower extremities.

## 2024-06-10 NOTE — ED PROVIDER NOTE - OBJECTIVE STATEMENT
67 y/o male w PMH of HTN, HLD, Gout, Chronic pain due to herniated lumbar disc, presents to ED w R lower leg pain and swelling. 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 Pt was seen in this ED on June 7, 2024, imaging performed negative, and was DC on doxycyline for cellulitis. Patient has been complaint with his antibiotics but notes it has gotten worse w pain, erythema, and swelling. Denies fever chills cp sob numbness weakness tingling new trauma or injury. Pt using a cane to help ambulate bc of pain.

## 2024-06-10 NOTE — ED ADULT NURSE NOTE - NSFALLRISKASMTTYPE_ED_ALL_ED
Initial (On Arrival)

## 2024-06-10 NOTE — PATIENT PROFILE ADULT - FALL HARM RISK - HARM RISK INTERVENTIONS

## 2024-06-10 NOTE — CHART NOTE - NSCHARTNOTEFT_GEN_A_CORE
I saw and examined the patient with the surgical team. Full consult to follow. Pt have no pain on passive stretch of R calf. Tenderness to palp over most of the anterior and posterior calf. No pulse abnormality or sensory change. Low suspicion for compartment syndrome but pt may have a deep abscess secondary to tissue injury from prior trauma. I recommend CT or MR with contrast to eval perfusion, compartments, and presence of possible abscess complicating his cellulitis. No evidence of DVT on Duplex scanning.    Elevate LE. Broad spectrum IV abx.

## 2024-06-11 LAB
ANION GAP SERPL CALC-SCNC: 9 MMOL/L — SIGNIFICANT CHANGE UP (ref 7–14)
BUN SERPL-MCNC: 19 MG/DL — SIGNIFICANT CHANGE UP (ref 10–20)
CALCIUM SERPL-MCNC: 9.1 MG/DL — SIGNIFICANT CHANGE UP (ref 8.4–10.5)
CHLORIDE SERPL-SCNC: 102 MMOL/L — SIGNIFICANT CHANGE UP (ref 98–110)
CO2 SERPL-SCNC: 26 MMOL/L — SIGNIFICANT CHANGE UP (ref 17–32)
CREAT SERPL-MCNC: 0.9 MG/DL — SIGNIFICANT CHANGE UP (ref 0.7–1.5)
EGFR: 94 ML/MIN/1.73M2 — SIGNIFICANT CHANGE UP
GLUCOSE SERPL-MCNC: 95 MG/DL — SIGNIFICANT CHANGE UP (ref 70–99)
HCT VFR BLD CALC: 39.5 % — LOW (ref 42–52)
HGB BLD-MCNC: 13.7 G/DL — LOW (ref 14–18)
MAGNESIUM SERPL-MCNC: 1.8 MG/DL — SIGNIFICANT CHANGE UP (ref 1.8–2.4)
MCHC RBC-ENTMCNC: 32.9 PG — HIGH (ref 27–31)
MCHC RBC-ENTMCNC: 34.7 G/DL — SIGNIFICANT CHANGE UP (ref 32–37)
MCV RBC AUTO: 94.7 FL — HIGH (ref 80–94)
NRBC # BLD: 0 /100 WBCS — SIGNIFICANT CHANGE UP (ref 0–0)
PLATELET # BLD AUTO: 121 K/UL — LOW (ref 130–400)
PMV BLD: 10.9 FL — HIGH (ref 7.4–10.4)
POTASSIUM SERPL-MCNC: 3.7 MMOL/L — SIGNIFICANT CHANGE UP (ref 3.5–5)
POTASSIUM SERPL-SCNC: 3.7 MMOL/L — SIGNIFICANT CHANGE UP (ref 3.5–5)
RBC # BLD: 4.17 M/UL — LOW (ref 4.7–6.1)
RBC # FLD: 12.7 % — SIGNIFICANT CHANGE UP (ref 11.5–14.5)
SODIUM SERPL-SCNC: 137 MMOL/L — SIGNIFICANT CHANGE UP (ref 135–146)
WBC # BLD: 7.22 K/UL — SIGNIFICANT CHANGE UP (ref 4.8–10.8)
WBC # FLD AUTO: 7.22 K/UL — SIGNIFICANT CHANGE UP (ref 4.8–10.8)

## 2024-06-11 PROCEDURE — 99233 SBSQ HOSP IP/OBS HIGH 50: CPT

## 2024-06-11 PROCEDURE — 99232 SBSQ HOSP IP/OBS MODERATE 35: CPT

## 2024-06-11 RX ORDER — CEFAZOLIN SODIUM 1 G
2000 VIAL (EA) INJECTION EVERY 8 HOURS
Refills: 0 | Status: DISCONTINUED | OUTPATIENT
Start: 2024-06-11 | End: 2024-06-13

## 2024-06-11 RX ORDER — TETANUS TOXOID, REDUCED DIPHTHERIA TOXOID AND ACELLULAR PERTUSSIS VACCINE, ADSORBED 5; 2.5; 8; 8; 2.5 [IU]/.5ML; [IU]/.5ML; UG/.5ML; UG/.5ML; UG/.5ML
0.5 SUSPENSION INTRAMUSCULAR ONCE
Refills: 0 | Status: DISCONTINUED | OUTPATIENT
Start: 2024-06-11 | End: 2024-06-13

## 2024-06-11 RX ADMIN — Medication 100 MILLIGRAM(S): at 21:44

## 2024-06-11 RX ADMIN — ATORVASTATIN CALCIUM 10 MILLIGRAM(S): 80 TABLET, FILM COATED ORAL at 21:44

## 2024-06-11 RX ADMIN — Medication 100 MILLIGRAM(S): at 06:31

## 2024-06-11 RX ADMIN — LOSARTAN POTASSIUM 100 MILLIGRAM(S): 100 TABLET, FILM COATED ORAL at 11:50

## 2024-06-11 RX ADMIN — TRAMADOL HYDROCHLORIDE 25 MILLIGRAM(S): 50 TABLET ORAL at 00:04

## 2024-06-11 RX ADMIN — TRAMADOL HYDROCHLORIDE 25 MILLIGRAM(S): 50 TABLET ORAL at 00:50

## 2024-06-11 RX ADMIN — Medication 100 MILLIGRAM(S): at 13:26

## 2024-06-11 RX ADMIN — Medication 100 MILLIGRAM(S): at 11:19

## 2024-06-11 RX ADMIN — TAMSULOSIN HYDROCHLORIDE 0.4 MILLIGRAM(S): 0.4 CAPSULE ORAL at 21:44

## 2024-06-11 RX ADMIN — ENOXAPARIN SODIUM 40 MILLIGRAM(S): 100 INJECTION SUBCUTANEOUS at 13:27

## 2024-06-11 NOTE — PROGRESS NOTE ADULT - SUBJECTIVE AND OBJECTIVE BOX
GENERAL SURGERY PROGRESS NOTE    S: Pt seen and examined at bedside.  Patient states that symptoms have remained the same since the day before, pain 10/10 on pain scale.  He notes that he feels discomfort resting his leg, and most of his pain is exacerbated with manipulation and palpation of affected area.  He was able to take pain medication, however patient states that he did not promote and relief of symptoms.  He was able to walk to and from the bathroom.  Denies fevers, body aches, chills, decreased sensation, numbness, tingling, or any new pain.        MEDICATIONS  (STANDING):  allopurinol 100 milliGRAM(s) Oral daily  atorvastatin 10 milliGRAM(s) Oral at bedtime  ceFAZolin   IVPB 1000 milliGRAM(s) IV Intermittent every 8 hours  enoxaparin Injectable 40 milliGRAM(s) SubCutaneous every 24 hours  hydrochlorothiazide 25 milliGRAM(s) Oral daily  lactated ringers. 1000 milliLiter(s) (50 mL/Hr) IV Continuous <Continuous>  losartan 100 milliGRAM(s) Oral daily  tamsulosin 0.4 milliGRAM(s) Oral at bedtime    MEDICATIONS  (PRN):  acetaminophen     Tablet .. 325 milliGRAM(s) Oral every 4 hours PRN Mild Pain (1 - 3)  baclofen 5 milliGRAM(s) Oral every 8 hours PRN for muscle spasm  ibuprofen  Tablet. 800 milliGRAM(s) Oral two times a day PRN Moderate Pain (4 - 6)  oxycodone    5 mG/acetaminophen 325 mG 1 Tablet(s) Oral every 6 hours PRN Severe Pain (7 - 10)  traMADol 25 milliGRAM(s) Oral every 6 hours PRN Moderate Pain (4 - 6)      LABS:                        13.7   7.22  )-----------( 121      ( 11 Jun 2024 07:26 )             39.5     06-11    137  |  102  |  19  ----------------------------<  95  3.7   |  26  |  0.9    Ca    9.1      11 Jun 2024 07:26  Mg     1.8     06-11    TPro  6.9  /  Alb  4.4  /  TBili  2.1<H>  /  DBili  x   /  AST  48<H>  /  ALT  26  /  AlkPhos  58  06-10      Urinalysis Basic - ( 11 Jun 2024 07:26 )    Color: x / Appearance: x / SG: x / pH: x  Gluc: 95 mg/dL / Ketone: x  / Bili: x / Urobili: x   Blood: x / Protein: x / Nitrite: x   Leuk Esterase: x / RBC: x / WBC x   Sq Epi: x / Non Sq Epi: x / Bacteria: x      Vital Signs Last 24 Hrs  T(C): 36.4 (11 Jun 2024 04:46), Max: 36.6 (10 Cornelius 2024 18:18)  T(F): 97.5 (11 Jun 2024 04:46), Max: 97.9 (10 Cornelius 2024 18:18)  HR: 53 (11 Jun 2024 04:46) (51 - 53)  BP: 135/66 (11 Jun 2024 04:46) (135/66 - 186/79)  RR: 18 (11 Jun 2024 04:46) (18 - 18)  SpO2: 97% (11 Jun 2024 04:46) (96% - 97%)    Parameters below as of 11 Jun 2024 04:46  Patient On (Oxygen Delivery Method): room air    GENERAL: Appears well, NAD, AAOx3  Neck: Supple  Chest: Equal expansion bilaterally, equal breath sounds  CV: Pulse regular presently  Extremities: erythema, warmth and ecchymosis of right lower extremity at calf region, initiating at knee and extends up until ankle.  Erythema around perimeter accompanied by ecchymosis to lateral surface. Tenderness to light palpation of affected region of erythema.  Right dorsalis pedis pulse difficult to palpate due to right pedal edema. FROM intact b/l.    NERVOUS: light sensation intact to lower extremities B/L     GENERAL SURGERY PROGRESS NOTE    S: Pt seen and examined at bedside.  Patient states that symptoms have remained the same since the day before, pain 10/10 on pain scale.  He notes that he feels discomfort resting his leg, and most of his pain is exacerbated with manipulation and palpation of affected area.  He was able to take pain medication, however patient states that he did not promote and relief of symptoms.  He was able to walk to and from the bathroom.  Denies fevers, body aches, chills, decreased sensation, numbness, tingling, or any new pain.        MEDICATIONS  (STANDING):  allopurinol 100 milliGRAM(s) Oral daily  atorvastatin 10 milliGRAM(s) Oral at bedtime  ceFAZolin   IVPB 1000 milliGRAM(s) IV Intermittent every 8 hours  enoxaparin Injectable 40 milliGRAM(s) SubCutaneous every 24 hours  hydrochlorothiazide 25 milliGRAM(s) Oral daily  lactated ringers. 1000 milliLiter(s) (50 mL/Hr) IV Continuous <Continuous>  losartan 100 milliGRAM(s) Oral daily  tamsulosin 0.4 milliGRAM(s) Oral at bedtime    MEDICATIONS  (PRN):  acetaminophen     Tablet .. 325 milliGRAM(s) Oral every 4 hours PRN Mild Pain (1 - 3)  baclofen 5 milliGRAM(s) Oral every 8 hours PRN for muscle spasm  ibuprofen  Tablet. 800 milliGRAM(s) Oral two times a day PRN Moderate Pain (4 - 6)  oxycodone    5 mG/acetaminophen 325 mG 1 Tablet(s) Oral every 6 hours PRN Severe Pain (7 - 10)  traMADol 25 milliGRAM(s) Oral every 6 hours PRN Moderate Pain (4 - 6)      Vital Signs Last 24 Hrs  T(C): 36.4 (11 Jun 2024 04:46), Max: 36.6 (10 Cornelius 2024 18:18)  T(F): 97.5 (11 Jun 2024 04:46), Max: 97.9 (10 Cornelius 2024 18:18)  HR: 53 (11 Jun 2024 04:46) (51 - 53)  BP: 135/66 (11 Jun 2024 04:46) (135/66 - 186/79)  RR: 18 (11 Jun 2024 04:46) (18 - 18)  SpO2: 97% (11 Jun 2024 04:46) (96% - 97%)    Parameters below as of 11 Jun 2024 04:46  Patient On (Oxygen Delivery Method): room air    GENERAL: Appears well, NAD, AAOx3  Neck: Supple  Chest: Equal expansion bilaterally, equal breath sounds  CV: Pulse regular presently  Extremities: erythema, warmth and ecchymosis of right lower extremity at calf region, initiating at knee and extends up until ankle.  Erythema around perimeter accompanied by ecchymosis to lateral surface. Tenderness to light palpation of affected region of erythema.  Right dorsalis pedis pulse difficult to palpate due to right pedal edema. FROM intact b/l.    NERVOUS: light sensation intact to lower extremities B/L      LABS:                        13.7   7.22  )-----------( 121      ( 11 Jun 2024 07:26 )             39.5     06-11    137  |  102  |  19  ----------------------------<  95  3.7   |  26  |  0.9    Ca    9.1      11 Jun 2024 07:26  Mg     1.8     06-11    TPro  6.9  /  Alb  4.4  /  TBili  2.1<H>  /  DBili  x   /  AST  48<H>  /  ALT  26  /  AlkPhos  58  06-10      Urinalysis Basic - ( 11 Jun 2024 07:26 )    Color: x / Appearance: x / SG: x / pH: x  Gluc: 95 mg/dL / Ketone: x  / Bili: x / Urobili: x   Blood: x / Protein: x / Nitrite: x   Leuk Esterase: x / RBC: x / WBC x   Sq Epi: x / Non Sq Epi: x / Bacteria: x    RADIOLOGY:  < from: CT Lower Extremity w/ IV Cont, Right (06.10.24 @ 22:44) >  IMPRESSION:    No acute fracture or dislocation.    Diffuse right leg subcutaneous edema, most significant at the lateral   knee and mid calf. No soft tissue gas. No organized collection.    --- End of Report ---          MARCELO PARKER MD; Resident Radiologist  This document has been electronically signed.  DANIA HOLLINS MD; Attending Radiologist  This document has been electronically signed. Jun 11 2024  7:26AM    < end of copied text >     GENERAL SURGERY PROGRESS NOTE    S: Pt seen and examined at bedside.  Patient states that symptoms have remained the same since the day before, pain 10/10 on pain scale.  He notes that he feels discomfort resting his leg, and most of his pain is exacerbated with manipulation and palpation of affected area.  He was able to take pain medication, however patient states that he did not have relief of symptoms.  He was able to walk to and from the bathroom.  Denies fevers, body aches, chills, decreased sensation, numbness, tingling, or any new pain.        MEDICATIONS  (STANDING):  allopurinol 100 milliGRAM(s) Oral daily  atorvastatin 10 milliGRAM(s) Oral at bedtime  ceFAZolin   IVPB 1000 milliGRAM(s) IV Intermittent every 8 hours  enoxaparin Injectable 40 milliGRAM(s) SubCutaneous every 24 hours  hydrochlorothiazide 25 milliGRAM(s) Oral daily  lactated ringers. 1000 milliLiter(s) (50 mL/Hr) IV Continuous <Continuous>  losartan 100 milliGRAM(s) Oral daily  tamsulosin 0.4 milliGRAM(s) Oral at bedtime    MEDICATIONS  (PRN):  acetaminophen     Tablet .. 325 milliGRAM(s) Oral every 4 hours PRN Mild Pain (1 - 3)  baclofen 5 milliGRAM(s) Oral every 8 hours PRN for muscle spasm  ibuprofen  Tablet. 800 milliGRAM(s) Oral two times a day PRN Moderate Pain (4 - 6)  oxycodone    5 mG/acetaminophen 325 mG 1 Tablet(s) Oral every 6 hours PRN Severe Pain (7 - 10)  traMADol 25 milliGRAM(s) Oral every 6 hours PRN Moderate Pain (4 - 6)      Vital Signs Last 24 Hrs  T(C): 36.4 (11 Jun 2024 04:46), Max: 36.6 (10 Cornelius 2024 18:18)  T(F): 97.5 (11 Jun 2024 04:46), Max: 97.9 (10 Cornelius 2024 18:18)  HR: 53 (11 Jun 2024 04:46) (51 - 53)  BP: 135/66 (11 Jun 2024 04:46) (135/66 - 186/79)  RR: 18 (11 Jun 2024 04:46) (18 - 18)  SpO2: 97% (11 Jun 2024 04:46) (96% - 97%)    Parameters below as of 11 Jun 2024 04:46  Patient On (Oxygen Delivery Method): room air    GENERAL: Appears well, NAD, AAOx3  Neck: Supple  Chest: Equal expansion bilaterally, equal breath sounds  CV: Pulse regular presently  Extremities: erythema, warmth and ecchymosis of right lower extremity at calf region, initiating at knee and extends up until ankle.  Erythema around perimeter accompanied by ecchymosis to lateral surface. Tenderness to light palpation of affected region of erythema.  Right dorsalis pedis pulse difficult to palpate due to right pedal edema. FROM intact b/l.    NERVOUS: light sensation intact to lower extremities B/L      LABS:                        13.7   7.22  )-----------( 121      ( 11 Jun 2024 07:26 )             39.5     06-11    137  |  102  |  19  ----------------------------<  95  3.7   |  26  |  0.9    Ca    9.1      11 Jun 2024 07:26  Mg     1.8     06-11    TPro  6.9  /  Alb  4.4  /  TBili  2.1<H>  /  DBili  x   /  AST  48<H>  /  ALT  26  /  AlkPhos  58  06-10      Urinalysis Basic - ( 11 Jun 2024 07:26 )    Color: x / Appearance: x / SG: x / pH: x  Gluc: 95 mg/dL / Ketone: x  / Bili: x / Urobili: x   Blood: x / Protein: x / Nitrite: x   Leuk Esterase: x / RBC: x / WBC x   Sq Epi: x / Non Sq Epi: x / Bacteria: x    RADIOLOGY:  < from: CT Lower Extremity w/ IV Cont, Right (06.10.24 @ 22:44) >  IMPRESSION:    No acute fracture or dislocation.    Diffuse right leg subcutaneous edema, most significant at the lateral   knee and mid calf. No soft tissue gas. No organized collection.    --- End of Report ---          MARCELO PARKER MD; Resident Radiologist  This document has been electronically signed.  DANIA HOLLINS MD; Attending Radiologist  This document has been electronically signed. Jun 11 2024  7:26AM    < end of copied text >

## 2024-06-11 NOTE — CONSULT NOTE ADULT - ASSESSMENT
ASSESSMENT  This is a 69 y/o male with PMH of HTN, HLD, Gout, Chronic pain due to herniated lumbar disc is admitted for RLE injury.    IMPRESSION  #Right lower extremity injury  #Superimposed cellulitis  #History of HTN, Gout, Chronic back pain  #Obesity     RECOMMENDATIONS  -Follow up with blood cultures. Consider checking CPK.  -Less concerns for compartment syndrome currently.   -For now keep on IV cefazolin 2 gram q 8 hours.   -Pain control. Leg elevation.   -Check if patient has received Tdap, can give a shot if not given.     If any questions, please send a message or call on [email or web address] Teams  Please continue to update ID with any pertinent new laboratory or radiographic findings.    Donavon Gastelum M.D  Infectious Diseases Attending/   [employer] [facility]

## 2024-06-11 NOTE — PROGRESS NOTE ADULT - NSPROGADDITIONALINFOA_GEN_ALL_CORE
I saw and examined the patient I agree with above assessment and plan. Erythema and swelling is slightly decreased. CT does not demonstrate drainable fluid. Cont current care and elevation.

## 2024-06-11 NOTE — CONSULT NOTE ADULT - SUBJECTIVE AND OBJECTIVE BOX
NELSON CROWE  68y, Male  Allergies    68y  Intolerances    Male    LOS  1d    HPI  HPI:                                         67 y/o male:  PMH:  HTN, HLD, Gout, Chronic pain due to herniated lumbar disc.  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 Patient has been complaint with his antibiotic   - Patient presents to Er today due to continued swelling with worsening erythema and pain  - patient denies fever, chills,  - patient uses cane to get up from a sitting position (10 Cornelius 2024 12:45)      INFECTIOUS DISEASE HISTORY:  Hospital course-  ID consulted for antimicrobial recommendations.     Prior hospital charts reviewed [Yes]  Primary team notes reviewed [Yes]  Other consultant notes reviewed [Yes]    REVIEW OF SYSTEMS:  CONSTITUTIONAL: No fever or chills  HEENT: No sore throat  RESPIRATORY: No cough, no shortness of breath  CARDIOVASCULAR: No chest pain or palpitations  GASTROINTESTINAL: No abdominal or epigastric pain  GENITOURINARY: No dysuria  NEUROLOGICAL: No headache/dizziness  MSK: No joint pain, erythema, or swelling; no back pain  SKIN: No itching, rashes  All other ROS negative except noted above    PAST MEDICAL & SURGICAL HISTORY:  HTN (hypertension)      Gout      HLD (hyperlipidemia)      History of BPH      Chronic back pain      H/O inguinal hernia repair      H/O total knee replacement, left          SOCIAL HISTORY:  - Born in [place], migrated [place] in 19XX  - Currently working as / Retired  - Lives with _____; no pets  - No recent travel  - Denies tobacco use, alcohol use, illicit drug use  - Currently sexually active, has one male/female sexual partner    FAMILY HISTORY:  FH: heart disease (Father)        Allergy: 1d    ANTIMICROBIALS:  ceFAZolin   IVPB 1000 every 8 hours      ANTIMICROBIALS (past 90 days):  MEDICATIONS  (STANDING):  ceFAZolin   IVPB   100 mL/Hr IV Intermittent (06-10-24 @ 10:13)    ceFAZolin   IVPB   100 mL/Hr IV Intermittent (06-11-24 @ 13:26)   100 mL/Hr IV Intermittent (06-11-24 @ 06:31)   100 mL/Hr IV Intermittent (06-10-24 @ 14:01)        OTHER MEDS:   MEDICATIONS  (STANDING):  acetaminophen     Tablet .. 325 every 4 hours PRN  allopurinol 100 daily  atorvastatin 10 at bedtime  baclofen 5 every 8 hours PRN  enoxaparin Injectable 40 every 24 hours  hydrochlorothiazide 25 daily  ibuprofen  Tablet. 800 two times a day PRN  losartan 100 daily  oxycodone    5 mG/acetaminophen 325 mG 1 every 6 hours PRN  tamsulosin 0.4 at bedtime  traMADol 25 every 6 hours PRN      VITALS:  Vital Signs Last 24 Hrs  T(F): 97.7 (06-11-24 @ 15:03), Max: 98 (06-07-24 @ 12:36)    Vital Signs Last 24 Hrs  HR: 50 (06-11-24 @ 15:03) (50 - 53)  BP: 126/64 (06-11-24 @ 15:03) (126/64 - 186/79)  RR: 18 (06-11-24 @ 15:03)  SpO2: 97% (06-11-24 @ 15:03) (96% - 97%)  Wt(kg): --    EXAM:  GENERAL: NAD, lying in bed  HEAD: No head lesions  NECK: Supple, nontender to palpation; no JVD  CHEST/LUNG: Clear to auscultation bilaterally  HEART: S1 S2  ABDOMEN: Soft, nontender, nondistended; normoactive bowel sounds  EXTREMITIES: No clubbing, cyanosis, or petal edema  NERVOUS SYSTEM: Alert and oriented to person, time, place and situation, speech clear. No focal deficits   MSK: No joint erythema, swelling or pain  SKIN: No rashes or lesions, no superficial thrombophlebitis    Labs:                        13.7   7.22  )-----------( 121      ( 11 Jun 2024 07:26 )             39.5     06-11    137  |  102  |  19  ----------------------------<  95  3.7   |  26  |  0.9    Ca    9.1      11 Jun 2024 07:26  Mg     1.8     06-11    TPro  6.9  /  Alb  4.4  /  TBili  2.1<H>  /  DBili  x   /  AST  48<H>  /  ALT  26  /  AlkPhos  58  06-10      WBC Trend:  WBC Count: 7.22 (06-11-24 @ 07:26)  WBC Count: 6.87 (06-10-24 @ 15:15)  WBC Count: 7.24 (06-10-24 @ 10:02)      Auto Neutrophil #: 5.10 K/uL (06-10-24 @ 10:02)      Creatine Trend:  Creatinine: 0.9 (06-11)  Creatinine: 1.0 (06-10)      Liver Biochemical Testing Trend:  Alanine Aminotransferase (ALT/SGPT): 26 (06-10)  Aspartate Aminotransferase (AST/SGOT): 48 (06-10-24 @ 10:02)  Bilirubin Total: 2.1 (06-10)      Trend LDH      Auto Eosinophil %: 5.8 % (06-10-24 @ 10:02)      Urinalysis Basic - ( 11 Jun 2024 07:26 )    Color: x / Appearance: x / SG: x / pH: x  Gluc: 95 mg/dL / Ketone: x  / Bili: x / Urobili: x   Blood: x / Protein: x / Nitrite: x   Leuk Esterase: x / RBC: x / WBC x   Sq Epi: x / Non Sq Epi: x / Bacteria: x        MICROBIOLOGY:    Male                                                  Lactate, Blood: 1.1 (06-10 @ 17:20)        INFLAMMATORY MARKERS      RADIOLOGY & ADDITIONAL TESTS:  I have personally reviewed the imagings.  CXR      CT      CARDIOLOGY TESTING             NELSON CROWE  68y, Male  Allergies    penicillins (Rash)    Intolerances    LOS  1d    HPI  HPI:                                         67 y/o male:  PMH:  HTN, HLD, Gout, Chronic pain due to herniated lumbar disc.  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 Patient has been complaint with his antibiotic   - Patient presents to Er today due to continued swelling with worsening erythema and pain  - patient denies fever, chills,  - patient uses cane to get up from a sitting position (10 Cornelius 2024 12:45)      INFECTIOUS DISEASE HISTORY:  Hospital course-  ID consulted for antimicrobial recommendations.     Prior hospital charts reviewed [Yes]  Primary team notes reviewed [Yes]  Other consultant notes reviewed [Yes]    REVIEW OF SYSTEMS:  CONSTITUTIONAL: No fever or chills  HEENT: No sore throat  RESPIRATORY: No cough, no shortness of breath  CARDIOVASCULAR: No chest pain or palpitations  GASTROINTESTINAL: No abdominal or epigastric pain  GENITOURINARY: No dysuria  NEUROLOGICAL: No headache/dizziness  MSK: No joint pain, erythema, or swelling; no back pain  SKIN: No itching, rashes  All other ROS negative except noted above    PAST MEDICAL & SURGICAL HISTORY:  HTN (hypertension)      Gout      HLD (hyperlipidemia)      History of BPH      Chronic back pain      H/O inguinal hernia repair      H/O total knee replacement, left          SOCIAL HISTORY:  - No recent travel    FAMILY HISTORY:  FH: heart disease (Father)    ANTIMICROBIALS:  ceFAZolin   IVPB 1000 every 8 hours      ANTIMICROBIALS (past 90 days):  MEDICATIONS  (STANDING):  ceFAZolin   IVPB   100 mL/Hr IV Intermittent (06-10-24 @ 10:13)    ceFAZolin   IVPB   100 mL/Hr IV Intermittent (06-11-24 @ 13:26)   100 mL/Hr IV Intermittent (06-11-24 @ 06:31)   100 mL/Hr IV Intermittent (06-10-24 @ 14:01)        OTHER MEDS:   MEDICATIONS  (STANDING):  acetaminophen     Tablet .. 325 every 4 hours PRN  allopurinol 100 daily  atorvastatin 10 at bedtime  baclofen 5 every 8 hours PRN  enoxaparin Injectable 40 every 24 hours  hydrochlorothiazide 25 daily  ibuprofen  Tablet. 800 two times a day PRN  losartan 100 daily  oxycodone    5 mG/acetaminophen 325 mG 1 every 6 hours PRN  tamsulosin 0.4 at bedtime  traMADol 25 every 6 hours PRN      VITALS:  Vital Signs Last 24 Hrs  T(F): 97.7 (06-11-24 @ 15:03), Max: 98 (06-07-24 @ 12:36)    Vital Signs Last 24 Hrs  HR: 50 (06-11-24 @ 15:03) (50 - 53)  BP: 126/64 (06-11-24 @ 15:03) (126/64 - 186/79)  RR: 18 (06-11-24 @ 15:03)  SpO2: 97% (06-11-24 @ 15:03) (96% - 97%)  Wt(kg): --    EXAM:  GENERAL: NAD, Obese  HEAD: No head lesions  NECK: Supple  CHEST/LUNG: Clear to auscultation bilaterally  HEART: S1 S2  ABDOMEN: Soft, nontender  EXTREMITIES: Right lower extremity swollen with redness/Warmth and pain. No joint swelling.   NERVOUS SYSTEM: Alert and oriented to person  MSK: No joint erythema, swelling or pain  SKIN: No rashes or lesions, no superficial thrombophlebitis    Labs:                        13.7   7.22  )-----------( 121      ( 11 Jun 2024 07:26 )             39.5     06-11    137  |  102  |  19  ----------------------------<  95  3.7   |  26  |  0.9    Ca    9.1      11 Jun 2024 07:26  Mg     1.8     06-11    TPro  6.9  /  Alb  4.4  /  TBili  2.1<H>  /  DBili  x   /  AST  48<H>  /  ALT  26  /  AlkPhos  58  06-10      WBC Trend:  WBC Count: 7.22 (06-11-24 @ 07:26)  WBC Count: 6.87 (06-10-24 @ 15:15)  WBC Count: 7.24 (06-10-24 @ 10:02)      Auto Neutrophil #: 5.10 K/uL (06-10-24 @ 10:02)      Creatine Trend:  Creatinine: 0.9 (06-11)  Creatinine: 1.0 (06-10)      Liver Biochemical Testing Trend:  Alanine Aminotransferase (ALT/SGPT): 26 (06-10)  Aspartate Aminotransferase (AST/SGOT): 48 (06-10-24 @ 10:02)  Bilirubin Total: 2.1 (06-10)      Trend LDH      Auto Eosinophil %: 5.8 % (06-10-24 @ 10:02)      Urinalysis Basic - ( 11 Jun 2024 07:26 )    Color: x / Appearance: x / SG: x / pH: x  Gluc: 95 mg/dL / Ketone: x  / Bili: x / Urobili: x   Blood: x / Protein: x / Nitrite: x   Leuk Esterase: x / RBC: x / WBC x   Sq Epi: x / Non Sq Epi: x / Bacteria: x        MICROBIOLOGY:    Male    Lactate, Blood: 1.1 (06-10 @ 17:20)        INFLAMMATORY MARKERS      RADIOLOGY & ADDITIONAL TESTS:  I have personally reviewed the imagings.  CXR      CT    < from: CT Lower Extremity w/ IV Cont, Right (06.10.24 @ 22:44) >  BONES/JOINTS:  No acute fracture or dislocation. No knee joint effusion.   Mild osteoarthritis of the medial and lateral compartments of the right   knee. Moderate osteoarthritis of the patellofemoral compartment. No   significant knee or ankle effusion. Mild degenerative changes of the   right hip. Degenerative changes of the pubic symphysis.    SOFT TISSUES: Diffuse right leg subcutaneous edema, most significant in   the lateral knee and mid calf (302/245). No soft tissue gas. Large   fat-containing right inguinal hernia.    OTHER:  Atherosclerotic calcifications.      IMPRESSION:    No acute fracture or dislocation.    Diffuse right leg subcutaneous edema, most significant at the lateral   knee and mid calf. No soft tissue gas. No organized collection.    < end of copied text >  < from: [facility] Duplex Lower Ext Vein Scan, Bilat (06.10.24 @ 11:00) >  IMPRESSION:  No evidence of deep venous thrombosis in either lower extremity.      < end of copied text >  < from: Xray Tibia + Fibula 2 Views, Right (06.10.24 @ 10:18) >  Findings/  impression:    No evidence of acute fracture or dislocation. No definite subcutaneous   gas.    Degenerative osseous changes and vascular calcification.    --- End of Report ---    < end of copied text >  < from: Xray Knee 4 Views, Right (06.07.24 @ 13:37) >  FINDINGS/  IMPRESSION:  No acute fracture or dislocation. Moderate tricompartmental degenerative   changes noted. No suprapatellar joint effusion. Unremarkable overlying   soft tissues. Atherosclerotic vascular ossifications present.    --- End of Report ---      < end of copied text >    CARDIOLOGY TESTING

## 2024-06-11 NOTE — PROGRESS NOTE ADULT - SUBJECTIVE AND OBJECTIVE BOX
SUBJECTIVE:    Patient is a 68y old Male who presents with a chief complaint of Cellulitis Right lower leg (11 Jun 2024 16:01)    Currently admitted to medicine with the primary diagnosis of Cellulitis of right lower leg       Today is hospital day 1d. This morning he is resting comfortably in bed and reports no new issues or overnight events.     ROS:   CONSTITUTIONAL: No weakness, fevers or chills   EYES/ENT: No visual changes; No vertigo or throat pain   NECK: No pain or stiffness   RESPIRATORY: No cough, wheezing, hemoptysis; No shortness of breath   CARDIOVASCULAR: No chest pain or palpitations   GASTROINTESTINAL: No abdominal or epigastric pain. No nausea, vomiting, or hematemesis; No diarrhea or constipation. No melena or hematochezia.  GENITOURINARY: No dysuria, frequency or hematuria  NEUROLOGICAL: No numbness or weakness        PAST MEDICAL & SURGICAL HISTORY  HTN (hypertension)    Gout    HLD (hyperlipidemia)    History of BPH    Chronic back pain    H/O inguinal hernia repair    H/O total knee replacement, left      SOCIAL HISTORY:    ALLERGIES:  penicillins (Rash)    MEDICATIONS:  STANDING MEDICATIONS  allopurinol 100 milliGRAM(s) Oral daily  atorvastatin 10 milliGRAM(s) Oral at bedtime  ceFAZolin   IVPB 1000 milliGRAM(s) IV Intermittent every 8 hours  enoxaparin Injectable 40 milliGRAM(s) SubCutaneous every 24 hours  hydrochlorothiazide 25 milliGRAM(s) Oral daily  losartan 100 milliGRAM(s) Oral daily  tamsulosin 0.4 milliGRAM(s) Oral at bedtime    PRN MEDICATIONS  acetaminophen     Tablet .. 325 milliGRAM(s) Oral every 4 hours PRN  baclofen 5 milliGRAM(s) Oral every 8 hours PRN  ibuprofen  Tablet. 800 milliGRAM(s) Oral two times a day PRN  oxycodone    5 mG/acetaminophen 325 mG 1 Tablet(s) Oral every 6 hours PRN  traMADol 25 milliGRAM(s) Oral every 6 hours PRN    VITALS:   T(F): 97.7  HR: 50  BP: 126/64  RR: 18  SpO2: 97%    LABS:  Negative for smoking/alcohol/drug use.                         13.7   7.22  )-----------( 121      ( 11 Jun 2024 07:26 )             39.5     06-11    137  |  102  |  19  ----------------------------<  95  3.7   |  26  |  0.9    Ca    9.1      11 Jun 2024 07:26  Mg     1.8     06-11    TPro  6.9  /  Alb  4.4  /  TBili  2.1<H>  /  DBili  x   /  AST  48<H>  /  ALT  26  /  AlkPhos  58  06-10      Urinalysis Basic - ( 11 Jun 2024 07:26 )    Color: x / Appearance: x / SG: x / pH: x  Gluc: 95 mg/dL / Ketone: x  / Bili: x / Urobili: x   Blood: x / Protein: x / Nitrite: x   Leuk Esterase: x / RBC: x / WBC x   Sq Epi: x / Non Sq Epi: x / Bacteria: x                RADIOLOGY:    PHYSICAL EXAM:  GEN: No acute distress  HEENT: normocephalic, atraumatic, aniceteric  LUNGS: Clear to auscultation bilaterally, no rales/wheezing/ rhonchi  HEART: S1/S2 present. RRR, no murmurs  ABD: Soft, non-tender, non-distended. Bowel sounds present  EXT: RLE edema and mild erythema   NEURO: AAOX3, normal affect      ASSESSMENT AND PLAN:    67 y/o male:  PMH:  HTN, HLD, Gout, Chronic pain due to herniated lumbar disc. Patient is currently taking doxycycline since June 7th/2024, presents to ER today due to no improvement in right lower extremity cellulites     # RLE cellulitis w/ significant edema - improved   - not improving after being discharged from emergency room on doxycycline   - significant RLE edema with tenderness out of proportion to erythema   - no sepsis   - duplex venous 6/7 and 6/10 neg , XRAY 6/7 neg   -  Xray Tibia + Fibula 2 Views, Right (06.10.24 @ 10:18) >No evidence of acute fracture or dislocation. No definite subcutaneous gas.  - Xray Knee 4 Views, Right (06.10.24 @ 10:18) >No evidence of acute fracture or dislocation. No gross joint effusion. Moderate to severe tricompartmental degenerative changes. Vascular calcification.  - CT Lower Extremity w/ IV Cont, Right (06.10.24 @ 22:44) >No acute fracture or dislocation.Diffuse right leg subcutaneous edema, most significant at the lateral knee and mid calf. No soft tissue gas. No organized collection.  - fu blood clx   - Discussed with ID - c/w Ancef   - SX eval appreciated    # H/O HTN - cw home medications    # H/O HLD - cw home medications     # H/O Gout - cw home medications     # Chronic Back pain w/ herniated disk - on motrin     dvt/ gi ppx/diet  dispo: acute  hadnoff: clinical improvement in leg swelling and pain / fu blood clx

## 2024-06-11 NOTE — PROGRESS NOTE ADULT - ASSESSMENT
Pt is a 67yo male PMHx HTN, BPH admitted for RLE cellulitis. Patient has experienced pain and redness x 1 week.  Denies fevers, chills    #RLE Cellulitis   # r/o Compartment Syndrome   - Patient is being followed by surgical team  - CT lower extremity w/ IV contrast: diffuse right leg subcutaneous edema, most significant at lateral knee and mid calf.  No soft tissue gas or organized collection   - IV antibiotics (cefazolin)  - Pain management   - ID consult pending  - F/U blood culture     DVT/GI PPX Pt is a 69yo male PMHx HTN, BPH admitted for RLE cellulitis. Patient has experienced pain and redness x 1 week.  Denies fevers, chills    #RLE Cellulitis   # r/o Compartment Syndrome   - Patient is being followed by surgical team  - CT lower extremity w/ IV contrast: diffuse right leg subcutaneous edema, most significant at lateral knee and mid calf.  No soft tissue gas or organized collection   - IV antibiotics (cefazolin)  - Pain management   - ID consult pending  - F/U blood culture     DVT/GI PPX Pt is a 67yo male  admitted for RLE cellulitis.    #RLE Cellulitis   # r/o Compartment Syndrome    - IV antibiotics per ID  - Pain management   - ID consult pending  - F/U blood culture   - cont LE elevation    DVT/GI PPX

## 2024-06-12 LAB
ALBUMIN SERPL ELPH-MCNC: 4.2 G/DL — SIGNIFICANT CHANGE UP (ref 3.5–5.2)
ALP SERPL-CCNC: 63 U/L — SIGNIFICANT CHANGE UP (ref 30–115)
ALT FLD-CCNC: 23 U/L — SIGNIFICANT CHANGE UP (ref 0–41)
ANION GAP SERPL CALC-SCNC: 10 MMOL/L — SIGNIFICANT CHANGE UP (ref 7–14)
AST SERPL-CCNC: 22 U/L — SIGNIFICANT CHANGE UP (ref 0–41)
BASOPHILS # BLD AUTO: 0.06 K/UL — SIGNIFICANT CHANGE UP (ref 0–0.2)
BASOPHILS NFR BLD AUTO: 0.9 % — SIGNIFICANT CHANGE UP (ref 0–1)
BILIRUB SERPL-MCNC: 1.8 MG/DL — HIGH (ref 0.2–1.2)
BUN SERPL-MCNC: 19 MG/DL — SIGNIFICANT CHANGE UP (ref 10–20)
CALCIUM SERPL-MCNC: 9.3 MG/DL — SIGNIFICANT CHANGE UP (ref 8.4–10.5)
CHLORIDE SERPL-SCNC: 99 MMOL/L — SIGNIFICANT CHANGE UP (ref 98–110)
CK SERPL-CCNC: 77 U/L — SIGNIFICANT CHANGE UP (ref 0–225)
CO2 SERPL-SCNC: 28 MMOL/L — SIGNIFICANT CHANGE UP (ref 17–32)
CREAT SERPL-MCNC: 0.9 MG/DL — SIGNIFICANT CHANGE UP (ref 0.7–1.5)
EGFR: 94 ML/MIN/1.73M2 — SIGNIFICANT CHANGE UP
EOSINOPHIL # BLD AUTO: 0.44 K/UL — SIGNIFICANT CHANGE UP (ref 0–0.7)
EOSINOPHIL NFR BLD AUTO: 6.6 % — SIGNIFICANT CHANGE UP (ref 0–8)
GLUCOSE SERPL-MCNC: 92 MG/DL — SIGNIFICANT CHANGE UP (ref 70–99)
HCT VFR BLD CALC: 40.8 % — LOW (ref 42–52)
HGB BLD-MCNC: 14.4 G/DL — SIGNIFICANT CHANGE UP (ref 14–18)
IMM GRANULOCYTES NFR BLD AUTO: 0.4 % — HIGH (ref 0.1–0.3)
LYMPHOCYTES # BLD AUTO: 1.14 K/UL — LOW (ref 1.2–3.4)
LYMPHOCYTES # BLD AUTO: 17 % — LOW (ref 20.5–51.1)
MCHC RBC-ENTMCNC: 33.2 PG — HIGH (ref 27–31)
MCHC RBC-ENTMCNC: 35.3 G/DL — SIGNIFICANT CHANGE UP (ref 32–37)
MCV RBC AUTO: 94 FL — SIGNIFICANT CHANGE UP (ref 80–94)
MONOCYTES # BLD AUTO: 0.67 K/UL — HIGH (ref 0.1–0.6)
MONOCYTES NFR BLD AUTO: 10 % — HIGH (ref 1.7–9.3)
NEUTROPHILS # BLD AUTO: 4.35 K/UL — SIGNIFICANT CHANGE UP (ref 1.4–6.5)
NEUTROPHILS NFR BLD AUTO: 65.1 % — SIGNIFICANT CHANGE UP (ref 42.2–75.2)
NRBC # BLD: 0 /100 WBCS — SIGNIFICANT CHANGE UP (ref 0–0)
PLATELET # BLD AUTO: 130 K/UL — SIGNIFICANT CHANGE UP (ref 130–400)
PMV BLD: 10.7 FL — HIGH (ref 7.4–10.4)
POTASSIUM SERPL-MCNC: 4.1 MMOL/L — SIGNIFICANT CHANGE UP (ref 3.5–5)
POTASSIUM SERPL-SCNC: 4.1 MMOL/L — SIGNIFICANT CHANGE UP (ref 3.5–5)
PROT SERPL-MCNC: 6.3 G/DL — SIGNIFICANT CHANGE UP (ref 6–8)
RBC # BLD: 4.34 M/UL — LOW (ref 4.7–6.1)
RBC # FLD: 12.4 % — SIGNIFICANT CHANGE UP (ref 11.5–14.5)
SODIUM SERPL-SCNC: 137 MMOL/L — SIGNIFICANT CHANGE UP (ref 135–146)
WBC # BLD: 6.69 K/UL — SIGNIFICANT CHANGE UP (ref 4.8–10.8)
WBC # FLD AUTO: 6.69 K/UL — SIGNIFICANT CHANGE UP (ref 4.8–10.8)

## 2024-06-12 PROCEDURE — 99232 SBSQ HOSP IP/OBS MODERATE 35: CPT

## 2024-06-12 RX ADMIN — Medication 100 MILLIGRAM(S): at 21:42

## 2024-06-12 RX ADMIN — ATORVASTATIN CALCIUM 10 MILLIGRAM(S): 80 TABLET, FILM COATED ORAL at 21:42

## 2024-06-12 RX ADMIN — Medication 100 MILLIGRAM(S): at 11:00

## 2024-06-12 RX ADMIN — TAMSULOSIN HYDROCHLORIDE 0.4 MILLIGRAM(S): 0.4 CAPSULE ORAL at 21:43

## 2024-06-12 RX ADMIN — ENOXAPARIN SODIUM 40 MILLIGRAM(S): 100 INJECTION SUBCUTANEOUS at 14:32

## 2024-06-12 RX ADMIN — Medication 100 MILLIGRAM(S): at 14:32

## 2024-06-12 RX ADMIN — LOSARTAN POTASSIUM 100 MILLIGRAM(S): 100 TABLET, FILM COATED ORAL at 06:15

## 2024-06-12 RX ADMIN — Medication 100 MILLIGRAM(S): at 06:16

## 2024-06-12 NOTE — PROGRESS NOTE ADULT - SUBJECTIVE AND OBJECTIVE BOX
Subjective: 69yo male admitted with RLE cellulitis. Pt is feeling better today. States pain is improving. Denies fever/chills. States walking is less painful.            Vital Signs Last 24 Hrs  T(C): 36.4 (12 Jun 2024 05:30), Max: 36.5 (11 Jun 2024 15:03)  T(F): 97.6 (12 Jun 2024 05:30), Max: 97.7 (11 Jun 2024 15:03)  HR: 51 (12 Jun 2024 05:30) (50 - 51)  BP: 135/69 (12 Jun 2024 05:30) (126/64 - 144/66)  BP(mean): --  RR: 18 (12 Jun 2024 05:30) (18 - 18)  SpO2: 96% (12 Jun 2024 05:30) (96% - 97%)    Parameters below as of 12 Jun 2024 05:30  Patient On (Oxygen Delivery Method): room air        General Appearance: Appears well, NAD  Neck: Supple  Chest: Equal expansion bilaterally, equal breath sounds  CV: Pulse regular presently  Abdomen: Soft, NT/ND,+bs,  no rebound/gaurding  Extremities: Grossly symmetric, no calf tend b/l, + tenderness R medial leg, decreasing erythema        I&O's Summary    I&O's Detail      MEDICATIONS  (STANDING):  allopurinol 100 milliGRAM(s) Oral daily  atorvastatin 10 milliGRAM(s) Oral at bedtime  ceFAZolin   IVPB 2000 milliGRAM(s) IV Intermittent every 8 hours  diphtheria/tetanus/pertussis (acellular) Vaccine (Adacel) 0.5 milliLiter(s) IntraMuscular once  enoxaparin Injectable 40 milliGRAM(s) SubCutaneous every 24 hours  hydrochlorothiazide 25 milliGRAM(s) Oral daily  losartan 100 milliGRAM(s) Oral daily  tamsulosin 0.4 milliGRAM(s) Oral at bedtime    MEDICATIONS  (PRN):  acetaminophen     Tablet .. 325 milliGRAM(s) Oral every 4 hours PRN Mild Pain (1 - 3)  baclofen 5 milliGRAM(s) Oral every 8 hours PRN for muscle spasm  ibuprofen  Tablet. 800 milliGRAM(s) Oral two times a day PRN Moderate Pain (4 - 6)  oxycodone    5 mG/acetaminophen 325 mG 1 Tablet(s) Oral every 6 hours PRN Severe Pain (7 - 10)  traMADol 25 milliGRAM(s) Oral every 6 hours PRN Moderate Pain (4 - 6)      LABS:                        14.4   6.69  )-----------( 130      ( 12 Jun 2024 07:22 )             40.8     06-12    137  |  99  |  19  ----------------------------<  92  4.1   |  28  |  0.9    Ca    9.3      12 Jun 2024 07:22  Mg     1.8     06-11    TPro  6.3  /  Alb  4.2  /  TBili  1.8<H>  /  DBili  x   /  AST  22  /  ALT  23  /  AlkPhos  63  06-12      Urinalysis Basic - ( 12 Jun 2024 07:22 )    Color: x / Appearance: x / SG: x / pH: x  Gluc: 92 mg/dL / Ketone: x  / Bili: x / Urobili: x   Blood: x / Protein: x / Nitrite: x   Leuk Esterase: x / RBC: x / WBC x   Sq Epi: x / Non Sq Epi: x / Bacteria: x        RADIOLOGY & ADDITIONAL STUDIES: none new

## 2024-06-12 NOTE — PROGRESS NOTE ADULT - ASSESSMENT
69 yo male with RLE cellulitis        Plan:  1. RLE cellulitis  - cont medical management  - no surgical intervention necess  - recall surgery PRN    All above D/W Dr Davenport and surgical team.

## 2024-06-12 NOTE — PROGRESS NOTE ADULT - ASSESSMENT
67 y/o male:  PMH:  HTN, HLD, Gout, Chronic pain due to herniated lumbar disc. Patient is currently taking doxycycline since June 7th/2024, presents to ER today due to no improvement in right lower extremity cellulites     # RLE cellulitis w/ significant edema - improved   - not improving after being discharged from emergency room on doxycycline   - significant RLE edema with tenderness out of proportion to erythema   - no sepsis   - duplex venous 6/7 and 6/10 neg , XRAY 6/7 neg   -  Xray Tibia + Fibula 2 Views, Right (06.10.24 @ 10:18) >No evidence of acute fracture or dislocation. No definite subcutaneous gas.  - Xray Knee 4 Views, Right (06.10.24 @ 10:18) >No evidence of acute fracture or dislocation. No gross joint effusion. Moderate to severe tricompartmental degenerative changes. Vascular calcification.  - CT Lower Extremity w/ IV Cont, Right (06.10.24 @ 22:44) >No acute fracture or dislocation.Diffuse right leg subcutaneous edema, most significant at the lateral knee and mid calf. No soft tissue gas. No organized collection.  - fu blood clx   - Discussed with ID - c/w Ancef   - SX eval appreciated, no surgical intervention recommended     # H/O HTN - cw home medications    # H/O HLD - cw home medications     # H/O Gout - cw home medications     # Chronic Back pain w/ herniated disk - on motrin     dvt/ gi ppx/diet  dispo: acute  hadnoff: clinical improvement in leg swelling and pain / fu blood clx

## 2024-06-12 NOTE — PROGRESS NOTE ADULT - SUBJECTIVE AND OBJECTIVE BOX
Patient seen and examined.  He feels RLE cellulitis is improving       T(F): 97.9 (06-12-24 @ 13:52), Max: 97.9 (06-12-24 @ 13:52)  HR: 56 (06-12-24 @ 13:52)  BP: 125/69 (06-12-24 @ 13:52)  RR: 18 (06-12-24 @ 13:52)  SpO2: 96% (06-12-24 @ 05:30) (96% - 97%)    PHYSICAL EXAM:  GEN: No acute distress  HEENT: normocephalic, atraumatic, anicteric  LUNGS: b/l breath sounds present, clear to auscultation bilaterally   HEART: S1/S2 present. RRR, no murmurs  ABD: Soft, non-tender, non-distended. Bowel sounds present  EXT: warm   NEURO: awake, no new focal deficits    LABS  06-12    137  |  99  |  19  ----------------------------<  92  4.1   |  28  |  0.9    Ca    9.3      12 Jun 2024 07:22  Mg     1.8     06-11    TPro  6.3  /  Alb  4.2  /  TBili  1.8<H>  /  DBili  x   /  AST  22  /  ALT  23  /  AlkPhos  63  06-12                          14.4   6.69  )-----------( 130      ( 12 Jun 2024 07:22 )             40.8         CARDIAC ENZYMES  Creatine Kinase, Serum: 77 (06-12 @ 07:22)       Culture Results:   No growth at 24 hours (06-10-24)  Culture Results:   No growth at 24 hours (06-10-24)    RADIOLOGY    MEDICATIONS  (STANDING):  allopurinol 100 milliGRAM(s) Oral daily  atorvastatin 10 milliGRAM(s) Oral at bedtime  ceFAZolin   IVPB 2000 milliGRAM(s) IV Intermittent every 8 hours  diphtheria/tetanus/pertussis (acellular) Vaccine (Adacel) 0.5 milliLiter(s) IntraMuscular once  enoxaparin Injectable 40 milliGRAM(s) SubCutaneous every 24 hours  hydrochlorothiazide 25 milliGRAM(s) Oral daily  losartan 100 milliGRAM(s) Oral daily  tamsulosin 0.4 milliGRAM(s) Oral at bedtime    MEDICATIONS  (PRN):  acetaminophen     Tablet .. 325 milliGRAM(s) Oral every 4 hours PRN Mild Pain (1 - 3)  baclofen 5 milliGRAM(s) Oral every 8 hours PRN for muscle spasm  ibuprofen  Tablet. 800 milliGRAM(s) Oral two times a day PRN Moderate Pain (4 - 6)  oxycodone    5 mG/acetaminophen 325 mG 1 Tablet(s) Oral every 6 hours PRN Severe Pain (7 - 10)  traMADol 25 milliGRAM(s) Oral every 6 hours PRN Moderate Pain (4 - 6)

## 2024-06-13 ENCOUNTER — TRANSCRIPTION ENCOUNTER (OUTPATIENT)
Age: 69
End: 2024-06-13

## 2024-06-13 PROCEDURE — 99232 SBSQ HOSP IP/OBS MODERATE 35: CPT

## 2024-06-13 RX ORDER — LINEZOLID 600 MG/300ML
600 INJECTION, SOLUTION INTRAVENOUS EVERY 12 HOURS
Refills: 0 | Status: DISCONTINUED | OUTPATIENT
Start: 2024-06-13 | End: 2024-06-14

## 2024-06-13 RX ADMIN — LOSARTAN POTASSIUM 100 MILLIGRAM(S): 100 TABLET, FILM COATED ORAL at 05:35

## 2024-06-13 RX ADMIN — LINEZOLID 600 MILLIGRAM(S): 600 INJECTION, SOLUTION INTRAVENOUS at 17:08

## 2024-06-13 RX ADMIN — Medication 100 MILLIGRAM(S): at 11:18

## 2024-06-13 RX ADMIN — Medication 100 MILLIGRAM(S): at 05:35

## 2024-06-13 RX ADMIN — ENOXAPARIN SODIUM 40 MILLIGRAM(S): 100 INJECTION SUBCUTANEOUS at 12:56

## 2024-06-13 RX ADMIN — ATORVASTATIN CALCIUM 10 MILLIGRAM(S): 80 TABLET, FILM COATED ORAL at 21:58

## 2024-06-13 RX ADMIN — TAMSULOSIN HYDROCHLORIDE 0.4 MILLIGRAM(S): 0.4 CAPSULE ORAL at 21:58

## 2024-06-13 NOTE — PROGRESS NOTE ADULT - ASSESSMENT
This is a 69 y/o male with PMH of HTN, HLD, Gout, Chronic pain due to herniated lumbar disc is admitted for RLE injury.    IMPRESSION  #Right lower extremity injury  #Superimposed cellulitis  #History of HTN, Gout, Chronic back pain  #Obesity     RECOMMENDATIONS  -Less concerns for compartment syndrome currently.   -Okay to transition to PO linezolid 600mg BID.  -Will likely be able to discharge him on PO Keflex 500mg Q 6 hours for 5 more days.     If any questions, please send a message or call on [employer] Teams  Please continue to update ID with any pertinent new laboratory or radiographic findings.    Donavon Gastelum M.D  Infectious Diseases Attending/   [employer] at [facility]

## 2024-06-13 NOTE — DISCHARGE NOTE PROVIDER - NSDCMRMEDTOKEN_GEN_ALL_CORE_FT
allopurinol 100 mg oral tablet: 1 tab(s) orally once a day  atorvastatin 10 mg oral tablet: 1 tab(s) orally  baclofen 10 mg oral tablet: 1 tab(s) orally 2 times a day as needed for  muscle spasm  doxycycline hyclate 100 mg oral tablet: 1 tab(s) orally every 12 hours Avoid prolonged exposure to sunlight when taking this medication.  ibuprofen 400 mg oral tablet: 2 tab(s) orally 2 times a day as needed for  moderate pain  losartan-hydroCHLOROthiazide 100 mg-25 mg oral tablet: 1 tab(s) orally once a day  tamsulosin 0.4 mg oral capsule: 1 cap(s) orally once a day (at bedtime)  testosterone:    allopurinol 100 mg oral tablet: 1 tab(s) orally once a day  atorvastatin 10 mg oral tablet: 1 tab(s) orally once a day  baclofen 10 mg oral tablet: 1 tab(s) orally 2 times a day as needed for  muscle spasm  cephalexin 500 mg oral tablet: 1 tab(s) orally every 6 hours  ibuprofen 400 mg oral tablet: 2 tab(s) orally 2 times a day as needed for  moderate pain  losartan-hydroCHLOROthiazide 100 mg-25 mg oral tablet: 1 tab(s) orally once a day  tamsulosin 0.4 mg oral capsule: 1 cap(s) orally once a day (at bedtime)  testosterone:

## 2024-06-13 NOTE — DISCHARGE NOTE PROVIDER - CARE PROVIDER_API CALL
Ryan Herrera  Internal Medicine  [address] [place] [address]  Phone: [phone]  Fax: [phone]  Follow Up Time: 1 week

## 2024-06-13 NOTE — DISCHARGE NOTE PROVIDER - ATTENDING DISCHARGE PHYSICAL EXAMINATION:
GEN: No acute distress  HEENT: normocephalic, atraumatic, anicteric  LUNGS: b/l breath sounds present, clear to auscultation bilaterally   HEART: S1/S2 present. RRR, no murmurs  ABD: Soft, non-tender, non-distended. Bowel sounds present  EXT: warm, mild RLE erythema (improved significantly)  NEURO: awake, no new focal deficits

## 2024-06-13 NOTE — DISCHARGE NOTE PROVIDER - HOSPITAL COURSE
67 y/o male:  PMH:  HTN, HLD, Gout, Chronic pain due to herniated lumbar disc. Patient is currently taking doxycycline since June 7th/2024, presents to ER today due to no improvement in right lower extremity cellulites     # RLE cellulitis w/ significant edema - improved   - not improving after being discharged from emergency room on doxycycline   - significant RLE edema with tenderness out of proportion to erythema   - no sepsis   - duplex venous 6/7 and 6/10 neg , XRAY 6/7 neg   -  Xray Tibia + Fibula 2 Views, Right (06.10.24 @ 10:18) >No evidence of acute fracture or dislocation. No definite subcutaneous gas.  - Xray Knee 4 Views, Right (06.10.24 @ 10:18) >No evidence of acute fracture or dislocation. No gross joint effusion. Moderate to severe tricompartmental degenerative changes. Vascular calcification.  - CT Lower Extremity w/ IV Cont, Right (06.10.24 @ 22:44) >No acute fracture or dislocation.Diffuse right leg subcutaneous edema, most significant at the lateral knee and mid calf. No soft tissue gas. No organized collection.  - fu blood clx   - Discussed with ID - c/w Ancef   - SX eval appreciated, no surgical intervention recommended     # H/O HTN - cw home medications    # H/O HLD - cw home medications     # H/O Gout - cw home medications     # Chronic Back pain w/ herniated disk - on motrin      69 y/o male:  PMH:  HTN, HLD, Gout, Chronic pain due to herniated lumbar disc. Patient is currently taking doxycycline since June 7th/2024, presents to ER today due to no improvement in right lower extremity cellulites     # RLE cellulitis w/ significant edema - improved   - was not improving after being discharged from emergency room on doxycycline   - presented with significant RLE edema with tenderness out of proportion to erythema   - no sepsis   - duplex venous 6/7 and 6/10 neg , XRAY 6/7 neg   -  Xray Tibia + Fibula 2 Views, Right (06.10.24 @ 10:18) >No evidence of acute fracture or dislocation. No definite subcutaneous gas.  - Xray Knee 4 Views, Right (06.10.24 @ 10:18) >No evidence of acute fracture or dislocation. No gross joint effusion. Moderate to severe tricompartmental degenerative changes. Vascular calcification.  - CT Lower Extremity w/ IV Cont, Right (06.10.24 @ 22:44) >No acute fracture or dislocation.Diffuse right leg subcutaneous edema, most significant at the lateral knee and mid calf. No soft tissue gas. No organized collection.  - blood cx negative  - Discussed with ID - c/w Ancef, improved, then transitioned to PO linezolid, on discharge keflex 500 q6 for 5 days  - SX eval appreciated, no surgical intervention recommended     # H/O HTN - cw home medications    # H/O HLD - cw home medications     # H/O Gout - cw home medications     # Chronic Back pain w/ herniated disk - on motrin

## 2024-06-13 NOTE — PROGRESS NOTE ADULT - SUBJECTIVE AND OBJECTIVE BOX
Patient seen and examined.  He feels leg is improving but still some redness      T(F): 97.9 (06-13-24 @ 14:20), Max: 97.9 (06-12-24 @ 20:37)  HR: 56 (06-13-24 @ 14:20)  BP: 133/71 (06-13-24 @ 14:20)  RR: 18 (06-13-24 @ 14:20)  SpO2: 97% (06-13-24 @ 04:32) (96% - 97%)    PHYSICAL EXAM:  GEN: No acute distress  HEENT: normocephalic, atraumatic, anicteric  LUNGS: b/l breath sounds present, clear to auscultation bilaterally   HEART: S1/S2 present. RRR, no murmurs  ABD: Soft, non-tender, non-distended. Bowel sounds present  EXT: RLE erythema, improving   NEURO: awake, no new focal deficits    LABS  06-12    137  |  99  |  19  ----------------------------<  92  4.1   |  28  |  0.9    Ca    9.3      12 Jun 2024 07:22    TPro  6.3  /  Alb  4.2  /  TBili  1.8<H>  /  DBili  x   /  AST  22  /  ALT  23  /  AlkPhos  63  06-12                          14.4   6.69  )-----------( 130      ( 12 Jun 2024 07:22 )             40.8            MEDICATIONS  (STANDING):  allopurinol 100 milliGRAM(s) Oral daily  atorvastatin 10 milliGRAM(s) Oral at bedtime  diphtheria/tetanus/pertussis (acellular) Vaccine (Adacel) 0.5 milliLiter(s) IntraMuscular once  enoxaparin Injectable 40 milliGRAM(s) SubCutaneous every 24 hours  hydrochlorothiazide 25 milliGRAM(s) Oral daily  linezolid    Tablet 600 milliGRAM(s) Oral every 12 hours  losartan 100 milliGRAM(s) Oral daily  tamsulosin 0.4 milliGRAM(s) Oral at bedtime    MEDICATIONS  (PRN):  acetaminophen     Tablet .. 325 milliGRAM(s) Oral every 4 hours PRN Mild Pain (1 - 3)  baclofen 5 milliGRAM(s) Oral every 8 hours PRN for muscle spasm  ibuprofen  Tablet. 800 milliGRAM(s) Oral two times a day PRN Moderate Pain (4 - 6)  oxycodone    5 mG/acetaminophen 325 mG 1 Tablet(s) Oral every 6 hours PRN Severe Pain (7 - 10)

## 2024-06-13 NOTE — DISCHARGE NOTE PROVIDER - NSDCFUSCHEDAPPT_GEN_ALL_CORE_FT
Isaiah Michaels  [facility]  CARDIOLOGY [address]  Scheduled Appointment: 06/24/2024    Jesús Zaidi  [facility]  ONCPAINMGT [address]  Scheduled Appointment: 07/03/2024

## 2024-06-13 NOTE — PROGRESS NOTE ADULT - ASSESSMENT
Cardiology Cardiology Cardiology Cardiology Cardiology Cardiology Cardiology Cardiology Cardiology Cardiology Cardiology Cardiology Cardiology Cardiology Cardiology Critical Care Gastroenterology Gastroenterology Gastroenterology Gastroenterology Gastroenterology Internal Medicine Internal Medicine Internal Medicine Internal Medicine Internal Medicine Internal Medicine Internal Medicine Internal Medicine Internal Medicine Internal Medicine Internal Medicine Internal Medicine Internal Medicine Internal Medicine Internal Medicine Internal Medicine Internal Medicine Internal Medicine Internal Medicine Internal Medicine Internal Medicine Internal Medicine Internal Medicine Internal Medicine Internal Medicine Internal Medicine Internal Medicine Internal Medicine Internal Medicine Internal Medicine Internal Medicine Nephrology Nutrition Support Nutrition Support Nutrition Support Pulmonology Pulmonology Pulmonology Pulmonology Pulmonology Pulmonology Pulmonology Pulmonology Pulmonology Critical Care Gastroenterology Gastroenterology Internal Medicine Internal Medicine Cardiology Internal Medicine Internal Medicine Internal Medicine Internal Medicine Internal Medicine Nephrology Pulmonology Pulmonology · Assessment	  69 y/o male:  PMH:  HTN, HLD, Gout, Chronic pain due to herniated lumbar disc. Patient is currently taking doxycycline since June 7th/2024, presents to ER today due to no improvement in right lower extremity cellulites     # RLE cellulitis w/ significant edema - improved   - not improving after being discharged from emergency room on doxycycline   - significant RLE edema with tenderness out of proportion to erythema   - no sepsis   - duplex venous 6/7 and 6/10 neg , XRAY 6/7 neg   -  Xray Tibia + Fibula 2 Views, Right (06.10.24 @ 10:18) >No evidence of acute fracture or dislocation. No definite subcutaneous gas.  - Xray Knee 4 Views, Right (06.10.24 @ 10:18) >No evidence of acute fracture or dislocation. No gross joint effusion. Moderate to severe tricompartmental degenerative changes. Vascular calcification.  - CT Lower Extremity w/ IV Cont, Right (06.10.24 @ 22:44) >No acute fracture or dislocation.Diffuse right leg subcutaneous edema, most significant at the lateral knee and mid calf. No soft tissue gas. No organized collection.  - fu blood clx   - Discussed with ID - switch to linezolid today  - SX eval appreciated, no surgical intervention recommended     # H/O HTN - cw home medications    # H/O HLD - cw home medications     # H/O Gout - cw home medications     # Chronic Back pain w/ herniated disk - on motrin     dvt/ gi ppx/diet  dispo: acute, likely d/c tomorrow   hadnoff: clinical improvement in leg swelling and pain / fu blood clx   Pulmonology

## 2024-06-13 NOTE — DISCHARGE NOTE PROVIDER - NSDCCPCAREPLAN_GEN_ALL_CORE_FT
PRINCIPAL DISCHARGE DIAGNOSIS  Diagnosis: Cellulitis of right lower leg  Assessment and Plan of Treatment: you were treated with IV antibiotic while inpatient  Surgery consulted who recommended no acute intervention  please continue antibiotic as directed  prescription will be sent to your pharmacy  follow up with PCP in 1 week for reassessment

## 2024-06-13 NOTE — PROGRESS NOTE ADULT - SUBJECTIVE AND OBJECTIVE BOX
NELSON CROWE  68y, Male    LOS  3d    INTERVAL EVENTS/HPI  - No acute events overnight  - T(F): , Max: 97.9 (06-12-24 @ 20:37)  - WBC Count: 6.69 (06-12-24 @ 07:22)  WBC Count: 7.22 (06-11-24 @ 07:26)  - Creatinine: 0.9 (06-12-24 @ 07:22)    REVIEW OF SYSTEMS:  CONSTITUTIONAL: No fever or chills  HEENT: No sore throat  RESPIRATORY: No cough, no shortness of breath  CARDIOVASCULAR: No chest pain or palpitations  GASTROINTESTINAL: No abdominal or epigastric pain  GENITOURINARY: No dysuria  NEUROLOGICAL: No headache/dizziness  MSK: No joint pain, erythema, or swelling; no back pain  SKIN: No itching, rashes  All other ROS negative except noted above    Prior hospital charts reviewed [Yes]  Primary team notes reviewed [Yes]  Other consultant notes reviewed [Yes]    ANTIMICROBIALS:   linezolid    Tablet 600 every 12 hours      OTHER MEDS: MEDICATIONS  (STANDING):  acetaminophen     Tablet .. 325 every 4 hours PRN  allopurinol 100 daily  atorvastatin 10 at bedtime  baclofen 5 every 8 hours PRN  diphtheria/tetanus/pertussis (acellular) Vaccine (Adacel) 0.5 once  enoxaparin Injectable 40 every 24 hours  hydrochlorothiazide 25 daily  ibuprofen  Tablet. 800 two times a day PRN  losartan 100 daily  oxycodone    5 mG/acetaminophen 325 mG 1 every 6 hours PRN  tamsulosin 0.4 at bedtime      Vital Signs Last 24 Hrs  T(F): 97.9 (06-13-24 @ 14:20), Max: 98 (06-07-24 @ 12:36)    Vital Signs Last 24 Hrs  HR: 56 (06-13-24 @ 14:20) (50 - 56)  BP: 133/71 (06-13-24 @ 14:20) (133/71 - 140/61)  RR: 18 (06-13-24 @ 14:20)  SpO2: 97% (06-13-24 @ 04:32) (96% - 97%)  Wt(kg): --    EXAM:  GENERAL: NAD, Obese  HEAD: No head lesions  NECK: Supple  CHEST/LUNG: Clear to auscultation bilaterally  HEART: S1 S2  ABDOMEN: Soft, nontender  EXTREMITIES: Right lower extremity swollen with redness/Warmth and pain improved.   NERVOUS SYSTEM: Alert and oriented to person  MSK: No joint erythema, swelling or pain  SKIN: No rashes or lesions, no superficial thrombophlebitis  Labs:                        14.4   6.69  )-----------( 130      ( 12 Jun 2024 07:22 )             40.8     06-12    137  |  99  |  19  ----------------------------<  92  4.1   |  28  |  0.9    Ca    9.3      12 Jun 2024 07:22    TPro  6.3  /  Alb  4.2  /  TBili  1.8<H>  /  DBili  x   /  AST  22  /  ALT  23  /  AlkPhos  63  06-12      WBC Trend:  WBC Count: 6.69 (06-12-24 @ 07:22)  WBC Count: 7.22 (06-11-24 @ 07:26)  WBC Count: 6.87 (06-10-24 @ 15:15)  WBC Count: 7.24 (06-10-24 @ 10:02)      Creatine Trend:  Creatinine: 0.9 (06-12)  Creatinine: 0.9 (06-11)  Creatinine: 1.0 (06-10)      Liver Biochemical Testing Trend:  Alanine Aminotransferase (ALT/SGPT): 23 (06-12)  Alanine Aminotransferase (ALT/SGPT): 26 (06-10)  Aspartate Aminotransferase (AST/SGOT): 22 (06-12-24 @ 07:22)  Aspartate Aminotransferase (AST/SGOT): 48 (06-10-24 @ 10:02)  Bilirubin Total: 1.8 (06-12)  Bilirubin Total: 2.1 (06-10)      Trend LDH      Urinalysis Basic - ( 12 Jun 2024 07:22 )    Color: x / Appearance: x / SG: x / pH: x  Gluc: 92 mg/dL / Ketone: x  / Bili: x / Urobili: x   Blood: x / Protein: x / Nitrite: x   Leuk Esterase: x / RBC: x / WBC x   Sq Epi: x / Non Sq Epi: x / Bacteria: x    MICROBIOLOGY:    Male    Culture - Blood (collected 10 Cornelius 2024 10:02)  Source: .Blood Blood-Peripheral  Preliminary Report:    No growth at 48 Hours    Culture - Blood (collected 10 Cornelius 2024 10:02)  Source: .Blood Blood-Peripheral  Preliminary Report:    No growth at 48 Hours    Lactate, Blood: 1.1 (06-10 @ 17:20)        RADIOLOGY & ADDITIONAL TESTS:  I have personally reviewed the relevant images.   CXR      CT        WEIGHT  Weight (kg): 99.8 (06-07-24 @ 12:36)      All available historical records have been reviewed

## 2024-06-14 ENCOUNTER — TRANSCRIPTION ENCOUNTER (OUTPATIENT)
Age: 69
End: 2024-06-14

## 2024-06-14 VITALS
DIASTOLIC BLOOD PRESSURE: 63 MMHG | SYSTOLIC BLOOD PRESSURE: 138 MMHG | HEART RATE: 57 BPM | OXYGEN SATURATION: 94 % | TEMPERATURE: 98 F | RESPIRATION RATE: 18 BRPM

## 2024-06-14 PROCEDURE — 99239 HOSP IP/OBS DSCHRG MGMT >30: CPT

## 2024-06-14 RX ORDER — CEPHALEXIN 500 MG
1 CAPSULE ORAL
Qty: 20 | Refills: 0
Start: 2024-06-14 | End: 2024-06-18

## 2024-06-14 RX ORDER — ATORVASTATIN CALCIUM 80 MG/1
1 TABLET, FILM COATED ORAL
Qty: 0 | Refills: 0 | DISCHARGE

## 2024-06-14 RX ADMIN — LINEZOLID 600 MILLIGRAM(S): 600 INJECTION, SOLUTION INTRAVENOUS at 06:04

## 2024-06-14 RX ADMIN — LOSARTAN POTASSIUM 100 MILLIGRAM(S): 100 TABLET, FILM COATED ORAL at 06:03

## 2024-06-14 NOTE — DISCHARGE NOTE NURSING/CASE MANAGEMENT/SOCIAL WORK - PATIENT PORTAL LINK FT
You can access the FollowMyHealth Patient Portal offered by [facility] by registering at the following website: [email or web address]. By joining [facility]’s FollowMyHealth portal, you will also be able to view your health information using other applications (apps) compatible with our system.

## 2024-06-14 NOTE — PROGRESS NOTE ADULT - ASSESSMENT
This is a 67 y/o male with PMH of HTN, HLD, Gout, Chronic pain due to herniated lumbar disc is admitted for RLE injury.    IMPRESSION  #Right lower extremity injury  #Superimposed cellulitis  #History of HTN, Gout, Chronic back pain  #Obesity     RECOMMENDATIONS  -Less concerns for compartment syndrome currently.   -Okay to transition to PO linezolid 600mg BID.  -Discharge him on PO Keflex 500mg Q 6 hours for 5 more days.     If any questions, please send a message or call on [email or web address] Teams  Please continue to update ID with any pertinent new laboratory or radiographic findings.    Donavon Gastelum M.D  Infectious Diseases Attending/   [employer] at [facility]

## 2024-06-14 NOTE — PROGRESS NOTE ADULT - REASON FOR ADMISSION
Cellulitis Right lower leg

## 2024-06-14 NOTE — PROGRESS NOTE ADULT - SUBJECTIVE AND OBJECTIVE BOX
NELSON CROWE  68y, Male    LOS  4d    INTERVAL EVENTS/HPI  - No acute events overnight  - T(F): , Max: 98.1 (06-13-24 @ 20:46)  - Denies any worsening symptoms  - Tolerating medication  - WBC Count: 6.69 (06-12-24 @ 07:22)    REVIEW OF SYSTEMS:  CONSTITUTIONAL: No fever or chills  HEENT: No sore throat  RESPIRATORY: No cough, no shortness of breath  CARDIOVASCULAR: No chest pain or palpitations  GASTROINTESTINAL: No abdominal or epigastric pain  GENITOURINARY: No dysuria  NEUROLOGICAL: No headache/dizziness  MSK: No joint pain, erythema, or swelling; no back pain  SKIN: No itching, rashes  All other ROS negative except noted above    Prior hospital charts reviewed [Yes]  Primary team notes reviewed [Yes]  Other consultant notes reviewed [Yes]    ANTIMICROBIALS:   linezolid    Tablet 600 every 12 hours      OTHER MEDS: MEDICATIONS  (STANDING):  acetaminophen     Tablet .. 325 every 4 hours PRN  allopurinol 100 daily  atorvastatin 10 at bedtime  baclofen 5 every 8 hours PRN  enoxaparin Injectable 40 every 24 hours  hydrochlorothiazide 25 daily  ibuprofen  Tablet. 800 two times a day PRN  losartan 100 daily  oxycodone    5 mG/acetaminophen 325 mG 1 every 6 hours PRN  tamsulosin 0.4 at bedtime      Vital Signs Last 24 Hrs  T(F): 97.5 (06-14-24 @ 05:42), Max: 98.1 (06-13-24 @ 20:46)    Vital Signs Last 24 Hrs  HR: 57 (06-14-24 @ 05:42) (56 - 65)  BP: 138/63 (06-14-24 @ 05:42) (127/66 - 138/63)  RR: 18 (06-14-24 @ 05:42)  SpO2: 94% (06-14-24 @ 05:42) (94% - 95%)  Wt(kg): --    EXAM:  GENERAL: NAD, Obese  HEAD: No head lesions  NECK: Supple  CHEST/LUNG: Clear to auscultation bilaterally  HEART: S1 S2  ABDOMEN: Soft, nontender  EXTREMITIES: Right lower extremity swollen with redness/Warmth and pain improved.   NERVOUS SYSTEM: Alert and oriented to person  MSK: No joint erythema, swelling or pain  SKIN: No rashes or lesions, no superficial thrombophlebitis  Labs:            WBC Trend:  WBC Count: 6.69 (06-12-24 @ 07:22)  WBC Count: 7.22 (06-11-24 @ 07:26)  WBC Count: 6.87 (06-10-24 @ 15:15)  WBC Count: 7.24 (06-10-24 @ 10:02)      Creatine Trend:  Creatinine: 0.9 (06-12)  Creatinine: 0.9 (06-11)  Creatinine: 1.0 (06-10)      Liver Biochemical Testing Trend:  Alanine Aminotransferase (ALT/SGPT): 23 (06-12)  Alanine Aminotransferase (ALT/SGPT): 26 (06-10)  Aspartate Aminotransferase (AST/SGOT): 22 (06-12-24 @ 07:22)  Aspartate Aminotransferase (AST/SGOT): 48 (06-10-24 @ 10:02)  Bilirubin Total: 1.8 (06-12)  Bilirubin Total: 2.1 (06-10)      Trend LDH          MICROBIOLOGY:    Male    Culture - Blood (collected 10 Cornelius 2024 10:02)  Source: .Blood Blood-Peripheral  Preliminary Report:    No growth at 72 Hours    Culture - Blood (collected 10 Cornelius 2024 10:02)  Source: .Blood Blood-Peripheral  Preliminary Report:    No growth at 72 Hours      RADIOLOGY & ADDITIONAL TESTS:  I have personally reviewed the relevant images.   CXR      CT        WEIGHT  Weight (kg): 99.8 (06-07-24 @ 12:36)      All available historical records have been reviewed

## 2024-06-14 NOTE — DISCHARGE NOTE NURSING/CASE MANAGEMENT/SOCIAL WORK - NSDCPEFALRISK_GEN_ALL_CORE
For information on Fall & Injury Prevention, visit: [email or web address] OR  [email or web address] OR  https://www.cdc.gov/steadi/patient.html

## 2024-06-24 ENCOUNTER — APPOINTMENT (OUTPATIENT)
Dept: CARDIOLOGY | Facility: CLINIC | Age: 69
End: 2024-06-24
Payer: COMMERCIAL

## 2024-06-24 VITALS — DIASTOLIC BLOOD PRESSURE: 70 MMHG | SYSTOLIC BLOOD PRESSURE: 126 MMHG | RESPIRATION RATE: 18 BRPM | HEART RATE: 59 BPM

## 2024-06-24 VITALS — HEIGHT: 68 IN | BODY MASS INDEX: 37.13 KG/M2 | WEIGHT: 245 LBS

## 2024-06-24 DIAGNOSIS — I34.0 NONRHEUMATIC MITRAL (VALVE) INSUFFICIENCY: ICD-10-CM

## 2024-06-24 DIAGNOSIS — N40.0 BENIGN PROSTATIC HYPERPLASIA WITHOUT LOWER URINARY TRACT SYMPTOMS: ICD-10-CM

## 2024-06-24 DIAGNOSIS — E66.9 OBESITY, UNSPECIFIED: ICD-10-CM

## 2024-06-24 DIAGNOSIS — Z88.0 ALLERGY STATUS TO PENICILLIN: ICD-10-CM

## 2024-06-24 DIAGNOSIS — L03.115 CELLULITIS OF RIGHT LOWER LIMB: ICD-10-CM

## 2024-06-24 DIAGNOSIS — I10 ESSENTIAL (PRIMARY) HYPERTENSION: ICD-10-CM

## 2024-06-24 DIAGNOSIS — E78.5 HYPERLIPIDEMIA, UNSPECIFIED: ICD-10-CM

## 2024-06-24 DIAGNOSIS — G47.30 SLEEP APNEA, UNSPECIFIED: ICD-10-CM

## 2024-06-24 DIAGNOSIS — M51.26 OTHER INTERVERTEBRAL DISC DISPLACEMENT, LUMBAR REGION: ICD-10-CM

## 2024-06-24 DIAGNOSIS — M10.9 GOUT, UNSPECIFIED: ICD-10-CM

## 2024-06-24 DIAGNOSIS — R60.9 EDEMA, UNSPECIFIED: ICD-10-CM

## 2024-06-24 PROBLEM — M54.9 DORSALGIA, UNSPECIFIED: Chronic | Status: ACTIVE | Noted: 2024-06-10

## 2024-06-24 PROBLEM — Z87.438 PERSONAL HISTORY OF OTHER DISEASES OF MALE GENITAL ORGANS: Chronic | Status: ACTIVE | Noted: 2024-06-10

## 2024-06-24 PROCEDURE — 99214 OFFICE O/P EST MOD 30 MIN: CPT | Mod: 25

## 2024-06-24 PROCEDURE — 93000 ELECTROCARDIOGRAM COMPLETE: CPT

## 2024-07-11 ENCOUNTER — APPOINTMENT (OUTPATIENT)
Dept: PAIN MANAGEMENT | Facility: CLINIC | Age: 69
End: 2024-07-11
Payer: OTHER MISCELLANEOUS

## 2024-07-11 ENCOUNTER — APPOINTMENT (OUTPATIENT)
Dept: PAIN MANAGEMENT | Facility: CLINIC | Age: 69
End: 2024-07-11

## 2024-07-11 DIAGNOSIS — M47.816 SPONDYLOSIS W/OUT MYELOPATHY OR RADICULOPATHY, LUMBAR REGION: ICD-10-CM

## 2024-07-11 PROCEDURE — 99214 OFFICE O/P EST MOD 30 MIN: CPT

## 2024-07-11 RX ORDER — IBUPROFEN 800 MG/1
800 TABLET, FILM COATED ORAL EVERY 8 HOURS
Qty: 90 | Refills: 0 | Status: ACTIVE | COMMUNITY
Start: 2024-07-11 | End: 1900-01-01

## 2024-07-31 ENCOUNTER — EMERGENCY (EMERGENCY)
Facility: HOSPITAL | Age: 69
LOS: 0 days | Discharge: HOME | End: 2024-07-31
Attending: EMERGENCY MEDICINE
Payer: COMMERCIAL

## 2024-07-31 ENCOUNTER — NON-APPOINTMENT (OUTPATIENT)
Age: 69
End: 2024-07-31

## 2024-07-31 VITALS
HEART RATE: 52 BPM | DIASTOLIC BLOOD PRESSURE: 63 MMHG | RESPIRATION RATE: 18 BRPM | SYSTOLIC BLOOD PRESSURE: 181 MMHG | OXYGEN SATURATION: 99 %

## 2024-07-31 VITALS
DIASTOLIC BLOOD PRESSURE: 75 MMHG | SYSTOLIC BLOOD PRESSURE: 183 MMHG | WEIGHT: 250 LBS | TEMPERATURE: 98 F | OXYGEN SATURATION: 99 % | RESPIRATION RATE: 18 BRPM | HEART RATE: 49 BPM | HEIGHT: 69 IN

## 2024-07-31 DIAGNOSIS — L30.9 DERMATITIS, UNSPECIFIED: ICD-10-CM

## 2024-07-31 DIAGNOSIS — R10.11 RIGHT UPPER QUADRANT PAIN: ICD-10-CM

## 2024-07-31 DIAGNOSIS — Z96.652 PRESENCE OF LEFT ARTIFICIAL KNEE JOINT: Chronic | ICD-10-CM

## 2024-07-31 DIAGNOSIS — Z98.890 OTHER SPECIFIED POSTPROCEDURAL STATES: Chronic | ICD-10-CM

## 2024-07-31 DIAGNOSIS — K59.00 CONSTIPATION, UNSPECIFIED: ICD-10-CM

## 2024-07-31 DIAGNOSIS — I10 ESSENTIAL (PRIMARY) HYPERTENSION: ICD-10-CM

## 2024-07-31 DIAGNOSIS — E78.5 HYPERLIPIDEMIA, UNSPECIFIED: ICD-10-CM

## 2024-07-31 DIAGNOSIS — Z88.0 ALLERGY STATUS TO PENICILLIN: ICD-10-CM

## 2024-07-31 LAB
ALBUMIN SERPL ELPH-MCNC: 4.2 G/DL — SIGNIFICANT CHANGE UP (ref 3.5–5.2)
ALP SERPL-CCNC: 57 U/L — SIGNIFICANT CHANGE UP (ref 30–115)
ALT FLD-CCNC: 77 U/L — HIGH (ref 0–41)
ANION GAP SERPL CALC-SCNC: 12 MMOL/L — SIGNIFICANT CHANGE UP (ref 7–14)
APPEARANCE UR: CLEAR — SIGNIFICANT CHANGE UP
AST SERPL-CCNC: 38 U/L — SIGNIFICANT CHANGE UP (ref 0–41)
BACTERIA # UR AUTO: ABNORMAL /HPF
BASOPHILS # BLD AUTO: 0.08 K/UL — SIGNIFICANT CHANGE UP (ref 0–0.2)
BASOPHILS NFR BLD AUTO: 0.9 % — SIGNIFICANT CHANGE UP (ref 0–1)
BILIRUB DIRECT SERPL-MCNC: 0.2 MG/DL — SIGNIFICANT CHANGE UP (ref 0–0.3)
BILIRUB INDIRECT FLD-MCNC: 0.8 MG/DL — SIGNIFICANT CHANGE UP (ref 0.2–1.2)
BILIRUB SERPL-MCNC: 1 MG/DL — SIGNIFICANT CHANGE UP (ref 0.2–1.2)
BILIRUB UR-MCNC: NEGATIVE — SIGNIFICANT CHANGE UP
BUN SERPL-MCNC: 20 MG/DL — SIGNIFICANT CHANGE UP (ref 10–20)
CALCIUM SERPL-MCNC: 9.7 MG/DL — SIGNIFICANT CHANGE UP (ref 8.4–10.5)
CHLORIDE SERPL-SCNC: 101 MMOL/L — SIGNIFICANT CHANGE UP (ref 98–110)
CO2 SERPL-SCNC: 27 MMOL/L — SIGNIFICANT CHANGE UP (ref 17–32)
COLOR SPEC: YELLOW — SIGNIFICANT CHANGE UP
CREAT SERPL-MCNC: 1.1 MG/DL — SIGNIFICANT CHANGE UP (ref 0.7–1.5)
DIFF PNL FLD: NEGATIVE — SIGNIFICANT CHANGE UP
EGFR: 74 ML/MIN/1.73M2 — SIGNIFICANT CHANGE UP
EOSINOPHIL # BLD AUTO: 0.81 K/UL — HIGH (ref 0–0.7)
EOSINOPHIL NFR BLD AUTO: 9.5 % — HIGH (ref 0–8)
EPI CELLS # UR: PRESENT
GLUCOSE SERPL-MCNC: 102 MG/DL — HIGH (ref 70–99)
GLUCOSE UR QL: NEGATIVE MG/DL — SIGNIFICANT CHANGE UP
HCT VFR BLD CALC: 44.7 % — SIGNIFICANT CHANGE UP (ref 42–52)
HGB BLD-MCNC: 15.3 G/DL — SIGNIFICANT CHANGE UP (ref 14–18)
IMM GRANULOCYTES NFR BLD AUTO: 1.3 % — HIGH (ref 0.1–0.3)
KETONES UR-MCNC: NEGATIVE MG/DL — SIGNIFICANT CHANGE UP
LEUKOCYTE ESTERASE UR-ACNC: ABNORMAL
LIDOCAIN IGE QN: 31 U/L — SIGNIFICANT CHANGE UP (ref 7–60)
LYMPHOCYTES # BLD AUTO: 1.23 K/UL — SIGNIFICANT CHANGE UP (ref 1.2–3.4)
LYMPHOCYTES # BLD AUTO: 14.5 % — LOW (ref 20.5–51.1)
MCHC RBC-ENTMCNC: 32.1 PG — HIGH (ref 27–31)
MCHC RBC-ENTMCNC: 34.2 G/DL — SIGNIFICANT CHANGE UP (ref 32–37)
MCV RBC AUTO: 93.9 FL — SIGNIFICANT CHANGE UP (ref 80–94)
MONOCYTES # BLD AUTO: 1 K/UL — HIGH (ref 0.1–0.6)
MONOCYTES NFR BLD AUTO: 11.8 % — HIGH (ref 1.7–9.3)
NEUTROPHILS # BLD AUTO: 5.26 K/UL — SIGNIFICANT CHANGE UP (ref 1.4–6.5)
NEUTROPHILS NFR BLD AUTO: 62 % — SIGNIFICANT CHANGE UP (ref 42.2–75.2)
NITRITE UR-MCNC: NEGATIVE — SIGNIFICANT CHANGE UP
NRBC # BLD: 0 /100 WBCS — SIGNIFICANT CHANGE UP (ref 0–0)
PH UR: 5.5 — SIGNIFICANT CHANGE UP (ref 5–8)
PLATELET # BLD AUTO: 152 K/UL — SIGNIFICANT CHANGE UP (ref 130–400)
PMV BLD: 10.1 FL — SIGNIFICANT CHANGE UP (ref 7.4–10.4)
POTASSIUM SERPL-MCNC: 4.1 MMOL/L — SIGNIFICANT CHANGE UP (ref 3.5–5)
POTASSIUM SERPL-SCNC: 4.1 MMOL/L — SIGNIFICANT CHANGE UP (ref 3.5–5)
PROT SERPL-MCNC: 6.8 G/DL — SIGNIFICANT CHANGE UP (ref 6–8)
PROT UR-MCNC: NEGATIVE MG/DL — SIGNIFICANT CHANGE UP
RBC # BLD: 4.76 M/UL — SIGNIFICANT CHANGE UP (ref 4.7–6.1)
RBC # FLD: 11.9 % — SIGNIFICANT CHANGE UP (ref 11.5–14.5)
RBC CASTS # UR COMP ASSIST: 3 /HPF — SIGNIFICANT CHANGE UP (ref 0–4)
SODIUM SERPL-SCNC: 140 MMOL/L — SIGNIFICANT CHANGE UP (ref 135–146)
SP GR SPEC: 1.02 — SIGNIFICANT CHANGE UP (ref 1–1.03)
UROBILINOGEN FLD QL: 0.2 MG/DL — SIGNIFICANT CHANGE UP (ref 0.2–1)
WBC # BLD: 8.49 K/UL — SIGNIFICANT CHANGE UP (ref 4.8–10.8)
WBC # FLD AUTO: 8.49 K/UL — SIGNIFICANT CHANGE UP (ref 4.8–10.8)
WBC UR QL: 13 /HPF — HIGH (ref 0–5)

## 2024-07-31 PROCEDURE — 96374 THER/PROPH/DIAG INJ IV PUSH: CPT | Mod: XU

## 2024-07-31 PROCEDURE — 74177 CT ABD & PELVIS W/CONTRAST: CPT | Mod: MC

## 2024-07-31 PROCEDURE — 99285 EMERGENCY DEPT VISIT HI MDM: CPT

## 2024-07-31 PROCEDURE — 99285 EMERGENCY DEPT VISIT HI MDM: CPT | Mod: 25

## 2024-07-31 PROCEDURE — 74177 CT ABD & PELVIS W/CONTRAST: CPT | Mod: 26,MC

## 2024-07-31 PROCEDURE — 85025 COMPLETE CBC W/AUTO DIFF WBC: CPT

## 2024-07-31 PROCEDURE — 71045 X-RAY EXAM CHEST 1 VIEW: CPT | Mod: 26

## 2024-07-31 PROCEDURE — 93010 ELECTROCARDIOGRAM REPORT: CPT

## 2024-07-31 PROCEDURE — 80076 HEPATIC FUNCTION PANEL: CPT

## 2024-07-31 PROCEDURE — 83690 ASSAY OF LIPASE: CPT

## 2024-07-31 PROCEDURE — 71045 X-RAY EXAM CHEST 1 VIEW: CPT

## 2024-07-31 PROCEDURE — 36415 COLL VENOUS BLD VENIPUNCTURE: CPT

## 2024-07-31 PROCEDURE — 93005 ELECTROCARDIOGRAM TRACING: CPT

## 2024-07-31 PROCEDURE — 96375 TX/PRO/DX INJ NEW DRUG ADDON: CPT

## 2024-07-31 PROCEDURE — 80048 BASIC METABOLIC PNL TOTAL CA: CPT

## 2024-07-31 PROCEDURE — 81001 URINALYSIS AUTO W/SCOPE: CPT

## 2024-07-31 RX ORDER — SODIUM CHLORIDE 0.9 % (FLUSH) 0.9 %
1000 SYRINGE (ML) INJECTION ONCE
Refills: 0 | Status: COMPLETED | OUTPATIENT
Start: 2024-07-31 | End: 2024-07-31

## 2024-07-31 RX ORDER — MORPHINE SULFATE 100 MG/1
4 TABLET, EXTENDED RELEASE ORAL ONCE
Refills: 0 | Status: DISCONTINUED | OUTPATIENT
Start: 2024-07-31 | End: 2024-07-31

## 2024-07-31 RX ORDER — KETOROLAC TROMETHAMINE 30 MG/ML
15 INJECTION, SOLUTION INTRAMUSCULAR ONCE
Refills: 0 | Status: DISCONTINUED | OUTPATIENT
Start: 2024-07-31 | End: 2024-07-31

## 2024-07-31 RX ORDER — ONDANSETRON HYDROCHLORIDE 2 MG/ML
4 INJECTION INTRAMUSCULAR; INTRAVENOUS ONCE
Refills: 0 | Status: COMPLETED | OUTPATIENT
Start: 2024-07-31 | End: 2024-07-31

## 2024-07-31 RX ADMIN — MORPHINE SULFATE 4 MILLIGRAM(S): 100 TABLET, EXTENDED RELEASE ORAL at 06:45

## 2024-07-31 RX ADMIN — ONDANSETRON HYDROCHLORIDE 4 MILLIGRAM(S): 2 INJECTION INTRAMUSCULAR; INTRAVENOUS at 03:29

## 2024-07-31 RX ADMIN — Medication 1000 MILLILITER(S): at 03:26

## 2024-07-31 RX ADMIN — KETOROLAC TROMETHAMINE 15 MILLIGRAM(S): 30 INJECTION, SOLUTION INTRAMUSCULAR at 03:27

## 2024-07-31 NOTE — ED PROVIDER NOTE - CLINICAL SUMMARY MEDICAL DECISION MAKING FREE TEXT BOX
Patient with right-sided abdominal pain somewhat worse with movement and associated with constipation, had a bowel movement after magnesium citrate, no vomiting, no fevers or chills, no urinary symptoms, on my exam abdomen soft with minimal right sided abdominal wall TTP, no CVA tenderness, has diffuse emphysematous blanching maculopapular rash which he states is a "dermatitis" for which she is on no medications, otherwise nothing to suggest shingles, labs and studies appreciated, will discharge to follow-up with his PMD, Motrin, MiraLAX, counseled regarding conditions which should prompt return.

## 2024-07-31 NOTE — ED PROVIDER NOTE - OBJECTIVE STATEMENT
68 year old male past medical history of Hypertension and HLD comes to emergency room for RUQ x 2 days on and off. patient seen by PMD and given prescription for CT of abd rule out gall stones. patient states that here tonight because pain is worse. no fever/chills.

## 2024-07-31 NOTE — ED PROVIDER NOTE - CARE PLAN
Assessment and plan of treatment:	ab pain  labs, imaging, supportive care   Principal Discharge DX:	Abdominal pain  Assessment and plan of treatment:	ab pain  labs, imaging, supportive care   1

## 2024-07-31 NOTE — ED PROVIDER NOTE - PATIENT PORTAL LINK FT
You can access the FollowMyHealth Patient Portal offered by [facility] by registering at the following website: [email or web address]. By joining [employer]’s FollowMyHealth portal, you will also be able to view your health information using other applications (apps) compatible with our system.

## 2024-07-31 NOTE — ED PROVIDER NOTE - PHYSICAL EXAMINATION
Physical Exam    Vital Signs: I have reviewed the initial vital signs.  Constitutional: well-nourished, appears stated age, no acute distress  Eyes: Conjunctiva pink, Sclera clear, PERRLA, EOMI.  Cardiovascular: S1 and S2, regular rate, regular rhythm, well-perfused extremities, radial pulses equal and 2+  Respiratory: unlabored respiratory effort, clear to auscultation bilaterally no wheezing, rales and rhonchi  Gastrointestinal: soft, RUQ, no pulsatile mass, normal bowl sounds  Musculoskeletal: supple neck, no lower extremity edema, no midline tenderness  Integumentary: warm, dry, no rash  Neurologic: awake, alert, cranial nerves II-XII grossly intact, extremities’ motor and sensory functions grossly intact  Psychiatric: appropriate mood, appropriate affect Physical Exam    Vital Signs: I have reviewed the initial vital signs.  Constitutional: well-nourished, appears stated age, no acute distress  Eyes: Conjunctiva pink, Sclera clear, PERRLA, EOMI.  Cardiovascular: S1 and S2, regular rate, regular rhythm, well-perfused extremities, radial pulses equal and 2+  Respiratory: unlabored respiratory effort, clear to auscultation bilaterally no wheezing, rales and rhonchi  Gastrointestinal: soft, RUQ ttp, no pulsatile mass, normal bowl sounds  Musculoskeletal: supple neck, no lower extremity edema, no midline tenderness  Integumentary: warm, dry, no rash  Neurologic: awake, alert, cranial nerves II-XII grossly intact, extremities’ motor and sensory functions grossly intact  Psychiatric: appropriate mood, appropriate affect

## 2024-07-31 NOTE — ED PROVIDER NOTE - NSCAREINITIATED _GEN_ER
No care due was identified.  [facility] Embedded Care Due Messages. Reference number: [id number].   8/18/2023 5:51:17 PM CDT  
Refill Decision Note   Yuri Mena  is requesting a refill authorization.  Brief Assessment and Rationale for Refill:  Approve     Medication Therapy Plan:         Comments:     Note composed:2:37 AM 08/20/2023               
Mario Bledsoe)

## 2024-08-01 ENCOUNTER — EMERGENCY (EMERGENCY)
Facility: HOSPITAL | Age: 69
LOS: 0 days | Discharge: HOME | End: 2024-08-01
Attending: EMERGENCY MEDICINE
Payer: COMMERCIAL

## 2024-08-01 VITALS
WEIGHT: 250 LBS | TEMPERATURE: 98 F | OXYGEN SATURATION: 99 % | HEIGHT: 69 IN | HEART RATE: 53 BPM | RESPIRATION RATE: 16 BRPM

## 2024-08-01 VITALS
HEART RATE: 60 BPM | OXYGEN SATURATION: 98 % | RESPIRATION RATE: 18 BRPM | SYSTOLIC BLOOD PRESSURE: 130 MMHG | DIASTOLIC BLOOD PRESSURE: 70 MMHG

## 2024-08-01 DIAGNOSIS — R00.1 BRADYCARDIA, UNSPECIFIED: ICD-10-CM

## 2024-08-01 DIAGNOSIS — R10.11 RIGHT UPPER QUADRANT PAIN: ICD-10-CM

## 2024-08-01 DIAGNOSIS — I10 ESSENTIAL (PRIMARY) HYPERTENSION: ICD-10-CM

## 2024-08-01 DIAGNOSIS — K86.2 CYST OF PANCREAS: ICD-10-CM

## 2024-08-01 DIAGNOSIS — E78.5 HYPERLIPIDEMIA, UNSPECIFIED: ICD-10-CM

## 2024-08-01 DIAGNOSIS — M10.9 GOUT, UNSPECIFIED: ICD-10-CM

## 2024-08-01 DIAGNOSIS — Z96.652 PRESENCE OF LEFT ARTIFICIAL KNEE JOINT: Chronic | ICD-10-CM

## 2024-08-01 DIAGNOSIS — Z98.890 OTHER SPECIFIED POSTPROCEDURAL STATES: Chronic | ICD-10-CM

## 2024-08-01 DIAGNOSIS — Z88.0 ALLERGY STATUS TO PENICILLIN: ICD-10-CM

## 2024-08-01 DIAGNOSIS — N40.0 BENIGN PROSTATIC HYPERPLASIA WITHOUT LOWER URINARY TRACT SYMPTOMS: ICD-10-CM

## 2024-08-01 DIAGNOSIS — Z87.19 PERSONAL HISTORY OF OTHER DISEASES OF THE DIGESTIVE SYSTEM: ICD-10-CM

## 2024-08-01 LAB
ALBUMIN SERPL ELPH-MCNC: 4.6 G/DL — SIGNIFICANT CHANGE UP (ref 3.5–5.2)
ALP SERPL-CCNC: 59 U/L — SIGNIFICANT CHANGE UP (ref 30–115)
ALT FLD-CCNC: 66 U/L — HIGH (ref 0–41)
ANION GAP SERPL CALC-SCNC: 9 MMOL/L — SIGNIFICANT CHANGE UP (ref 7–14)
AST SERPL-CCNC: 35 U/L — SIGNIFICANT CHANGE UP (ref 0–41)
BASOPHILS # BLD AUTO: 0.07 K/UL — SIGNIFICANT CHANGE UP (ref 0–0.2)
BASOPHILS NFR BLD AUTO: 0.9 % — SIGNIFICANT CHANGE UP (ref 0–1)
BILIRUB SERPL-MCNC: 1 MG/DL — SIGNIFICANT CHANGE UP (ref 0.2–1.2)
BUN SERPL-MCNC: 19 MG/DL — SIGNIFICANT CHANGE UP (ref 10–20)
CALCIUM SERPL-MCNC: 9.6 MG/DL — SIGNIFICANT CHANGE UP (ref 8.4–10.5)
CHLORIDE SERPL-SCNC: 101 MMOL/L — SIGNIFICANT CHANGE UP (ref 98–110)
CO2 SERPL-SCNC: 31 MMOL/L — SIGNIFICANT CHANGE UP (ref 17–32)
CREAT SERPL-MCNC: 1 MG/DL — SIGNIFICANT CHANGE UP (ref 0.7–1.5)
EGFR: 83 ML/MIN/1.73M2 — SIGNIFICANT CHANGE UP
EOSINOPHIL # BLD AUTO: 0.53 K/UL — SIGNIFICANT CHANGE UP (ref 0–0.7)
EOSINOPHIL NFR BLD AUTO: 7 % — SIGNIFICANT CHANGE UP (ref 0–8)
GLUCOSE SERPL-MCNC: 100 MG/DL — HIGH (ref 70–99)
HCT VFR BLD CALC: 45.9 % — SIGNIFICANT CHANGE UP (ref 42–52)
HGB BLD-MCNC: 15.8 G/DL — SIGNIFICANT CHANGE UP (ref 14–18)
IMM GRANULOCYTES NFR BLD AUTO: 0.9 % — HIGH (ref 0.1–0.3)
LIDOCAIN IGE QN: 30 U/L — SIGNIFICANT CHANGE UP (ref 7–60)
LYMPHOCYTES # BLD AUTO: 0.88 K/UL — LOW (ref 1.2–3.4)
LYMPHOCYTES # BLD AUTO: 11.6 % — LOW (ref 20.5–51.1)
MCHC RBC-ENTMCNC: 32.6 PG — HIGH (ref 27–31)
MCHC RBC-ENTMCNC: 34.4 G/DL — SIGNIFICANT CHANGE UP (ref 32–37)
MCV RBC AUTO: 94.6 FL — HIGH (ref 80–94)
MONOCYTES # BLD AUTO: 0.78 K/UL — HIGH (ref 0.1–0.6)
MONOCYTES NFR BLD AUTO: 10.3 % — HIGH (ref 1.7–9.3)
NEUTROPHILS # BLD AUTO: 5.26 K/UL — SIGNIFICANT CHANGE UP (ref 1.4–6.5)
NEUTROPHILS NFR BLD AUTO: 69.3 % — SIGNIFICANT CHANGE UP (ref 42.2–75.2)
NRBC # BLD: 0 /100 WBCS — SIGNIFICANT CHANGE UP (ref 0–0)
PLAT MORPH BLD: NORMAL — SIGNIFICANT CHANGE UP
PLATELET # BLD AUTO: 81 K/UL — LOW (ref 130–400)
PLATELET CLUMP BLD QL SMEAR: SIGNIFICANT CHANGE UP
PLATELET COUNT - ESTIMATE: ABNORMAL
PMV BLD: SIGNIFICANT CHANGE UP (ref 7.4–10.4)
POTASSIUM SERPL-MCNC: 4.6 MMOL/L — SIGNIFICANT CHANGE UP (ref 3.5–5)
POTASSIUM SERPL-SCNC: 4.6 MMOL/L — SIGNIFICANT CHANGE UP (ref 3.5–5)
PROT SERPL-MCNC: 6.8 G/DL — SIGNIFICANT CHANGE UP (ref 6–8)
RBC # BLD: 4.85 M/UL — SIGNIFICANT CHANGE UP (ref 4.7–6.1)
RBC # FLD: 11.9 % — SIGNIFICANT CHANGE UP (ref 11.5–14.5)
RBC BLD AUTO: NORMAL — SIGNIFICANT CHANGE UP
SODIUM SERPL-SCNC: 141 MMOL/L — SIGNIFICANT CHANGE UP (ref 135–146)
WBC # BLD: 7.59 K/UL — SIGNIFICANT CHANGE UP (ref 4.8–10.8)
WBC # FLD AUTO: 7.59 K/UL — SIGNIFICANT CHANGE UP (ref 4.8–10.8)

## 2024-08-01 PROCEDURE — 83690 ASSAY OF LIPASE: CPT

## 2024-08-01 PROCEDURE — 76705 ECHO EXAM OF ABDOMEN: CPT

## 2024-08-01 PROCEDURE — 85025 COMPLETE CBC W/AUTO DIFF WBC: CPT

## 2024-08-01 PROCEDURE — 93010 ELECTROCARDIOGRAM REPORT: CPT | Mod: 76

## 2024-08-01 PROCEDURE — 96374 THER/PROPH/DIAG INJ IV PUSH: CPT

## 2024-08-01 PROCEDURE — 80053 COMPREHEN METABOLIC PANEL: CPT

## 2024-08-01 PROCEDURE — 99285 EMERGENCY DEPT VISIT HI MDM: CPT | Mod: 25

## 2024-08-01 PROCEDURE — 76705 ECHO EXAM OF ABDOMEN: CPT | Mod: 26

## 2024-08-01 PROCEDURE — 36415 COLL VENOUS BLD VENIPUNCTURE: CPT

## 2024-08-01 PROCEDURE — 93005 ELECTROCARDIOGRAM TRACING: CPT

## 2024-08-01 PROCEDURE — 99285 EMERGENCY DEPT VISIT HI MDM: CPT

## 2024-08-01 RX ORDER — DEXTROSE MONOHYDRATE AND SODIUM CHLORIDE 5; .3 G/100ML; G/100ML
1000 INJECTION, SOLUTION INTRAVENOUS ONCE
Refills: 0 | Status: COMPLETED | OUTPATIENT
Start: 2024-08-01 | End: 2024-08-01

## 2024-08-01 RX ORDER — MORPHINE SULFATE 100 MG/1
4 TABLET, EXTENDED RELEASE ORAL ONCE
Refills: 0 | Status: DISCONTINUED | OUTPATIENT
Start: 2024-08-01 | End: 2024-08-01

## 2024-08-01 RX ORDER — METHOCARBAMOL 500 MG
2 TABLET ORAL
Qty: 8 | Refills: 0
Start: 2024-08-01 | End: 2024-08-04

## 2024-08-01 RX ADMIN — DEXTROSE MONOHYDRATE AND SODIUM CHLORIDE 1000 MILLILITER(S): 5; .3 INJECTION, SOLUTION INTRAVENOUS at 10:02

## 2024-08-01 RX ADMIN — MORPHINE SULFATE 4 MILLIGRAM(S): 100 TABLET, EXTENDED RELEASE ORAL at 10:02

## 2024-08-01 NOTE — ED PROVIDER NOTE - ATTENDING CONTRIBUTION TO CARE
9/10/2020 9:31 AM 
 
Patient:  Martin Gayle YOB: 1955 Date of Visit: 9/10/2020 Tiff Glover MD 
[facility] [address] [place] [id number] VIA Facsimile: [phone] Bebe Chow MD 
[address] [place] [address],[address] [facility] [id number] VIA Facsimile: [phone] Dear MD Bebe Price MD, 
 
 
I had the pleasure of seeing Ms. Saúl Fleming in my office today for her breast cancer. I am including a copy of my office visit today. If you have questions, please do not hesitate to call me. I look forward to following Ms. Juares along with you and will keep you updated as to her progress. Sincerely, Margie Nance MD 
 
  
 

Patient returns to ED with right upper abdominal pain worse with movement, was endorsed to me yesterday morning and had CT unremarkable for acute findings, felt to be MSK and was discharged however pain returned this morning, no fever, no vomiting or diarrhea, on exam vitals appreciated, nontoxic, abdomen soft with mild right upper abdominal and right flank TTP no G/R, rash unchanged, gallbladder sono unremarkable, patient feels much better after pain medications, will discharge with outpatient follow-up.  Patient counseled regarding conditions which should prompt return.

## 2024-08-01 NOTE — ED PROVIDER NOTE - CARE PROVIDER_API CALL
Ryan Herrera  Internal Medicine  [address] [place] [address]  Phone: [phone]  Fax: [phone]  Follow Up Time: Routine

## 2024-08-01 NOTE — ED ADULT NURSE NOTE - PAIN: PRESENCE, MLM
Problem: RESPIRATORY - ADULT  Goal: Achieves optimal ventilation and oxygenation  Description: INTERVENTIONS:  - Assess for changes in respiratory status  - Assess for changes in mentation and behavior  - Position to facilitate oxygenation and minimize r complains of pain/discomfort

## 2024-08-01 NOTE — ED PROVIDER NOTE - CLINICAL SUMMARY MEDICAL DECISION MAKING FREE TEXT BOX
Patient returns to ED with right upper abdominal pain worse with movement, was endorsed to me yesterday morning and had CT unremarkable for acute findings, felt to be MSK and was discharged however pain returned this morning, no fever, no vomiting or diarrhea, on exam vitals appreciated, nontoxic, abdomen soft with mild right upper abdominal and right flank TTP no G/R, rash unchanged, gallbladder sono unremarkable, patient feels much better after pain medications, will discharge with outpatient follow-up.  Patient counseled regarding conditions which should prompt return.

## 2024-08-01 NOTE — ED PROVIDER NOTE - PROGRESS NOTE DETAILS
Authored by Dr. Harvey: Spoke with patient who states his pain improved. Will follow up with his PMD tomorrow.

## 2024-08-01 NOTE — ED PROVIDER NOTE - OBJECTIVE STATEMENT
68y male with PMhx of HTN, HLD, BPH, gout, chronic back pain, h/o inguinal hernia replace, knee replacement who presents for right upper quadrant abdominal pain. Patient was seen in the ED yesterday for the same symptoms, had a negative CT and work up. He returns for persistent sharp pain in his RUQ, exacerbated with movement. No fevers, chills, CP, SOB, n/v/d, dysuria, hematuria, weakness, numbness, trauma to the area. Denies alcohol use, substance use, tobacco use.

## 2024-08-01 NOTE — ED PROVIDER NOTE - PHYSICAL EXAMINATION
VITAL SIGNS: I have reviewed nursing notes and confirm.  CONSTITUTIONAL: well-appearing, non-toxic, NAD  SKIN: Warm dry, normal skin turgor, no acute rash, no bruising  HEAD: NCAT  EYES: EOMI, PERRLA, no scleral icterus, normal conjunctiva  ENT: Moist mucous membranes, OR clear. Normal pharynx  NECK: Supple; non tender. Full ROM. No cervical LAD  CARD: RRR, no murmurs, rubs or gallops  RESP: clear to ausculation b/l.  No rales, rhonchi, or wheezing. No increased WOB.  ABD: soft, + BS, RUQ tenderness, non-distended, no rebound or guarding. No CVA tenderness  EXT: Full ROM, no bony tenderness, pulses intact in bilateral UE and LE, no pedal edema, no calf tenderness  NEURO: normal motor. normal sensory. Normal gait.  PSYCH: Cooperative, appropriate. AAOx3.

## 2024-08-01 NOTE — ED ADULT NURSE NOTE - NSFALLRISKASMTTYPE_ED_ALL_ED
Initial (On Arrival) Detail Level: Zone Render In Strict Bullet Format?: No Initiate Treatment: podofilox 0.5 % topical solution \\nApply to aa nightly for 3days off for 4 nights

## 2024-08-01 NOTE — ED PROVIDER NOTE - PATIENT PORTAL LINK FT
You can access the FollowMyHealth Patient Portal offered by [facility] by registering at the following website: [email or web address]. By joining [employer]’s FollowMyHealth portal, you will also be able to view your health information using other applications (apps) compatible with our system.

## 2024-09-06 ENCOUNTER — APPOINTMENT (OUTPATIENT)
Dept: PAIN MANAGEMENT | Facility: CLINIC | Age: 69
End: 2024-09-06
Payer: OTHER MISCELLANEOUS

## 2024-09-06 ENCOUNTER — RX RENEWAL (OUTPATIENT)
Age: 69
End: 2024-09-06

## 2024-09-06 DIAGNOSIS — M47.816 SPONDYLOSIS W/OUT MYELOPATHY OR RADICULOPATHY, LUMBAR REGION: ICD-10-CM

## 2024-09-06 PROCEDURE — 64494 INJ PARAVERT F JNT L/S 2 LEV: CPT | Mod: 50

## 2024-09-06 PROCEDURE — 64493 INJ PARAVERT F JNT L/S 1 LEV: CPT | Mod: 50

## 2024-09-24 ENCOUNTER — APPOINTMENT (OUTPATIENT)
Dept: PAIN MANAGEMENT | Facility: CLINIC | Age: 69
End: 2024-09-24
Payer: OTHER MISCELLANEOUS

## 2024-09-24 DIAGNOSIS — M16.12 UNILATERAL PRIMARY OSTEOARTHRITIS, LEFT HIP: ICD-10-CM

## 2024-09-24 DIAGNOSIS — M47.816 SPONDYLOSIS W/OUT MYELOPATHY OR RADICULOPATHY, LUMBAR REGION: ICD-10-CM

## 2024-09-24 PROCEDURE — 99214 OFFICE O/P EST MOD 30 MIN: CPT

## 2024-09-27 NOTE — PHYSICAL EXAM
[de-identified] : L spine   No rashes, erythema, edema noted TTP b/l lumbar paraspinal muscles Positive facet loading bilaterally Positive KEMPS test bilaterally LLE: 5/5 strength RLE: 5/5 strength Sensation intact b/l LE   L hip  No rashes, erythema, edema  TTP at anterior hip, gluteus, GTB Stability: no gross instability  ROM: Painful ROM  AUGUSTINE + Gait: limping with weight bearing   R hip   No rashes, erythema, edema TTP at anterior hip, gluteus, GTB Stability: no gross instability ROM: Painful & limited ROM  AUGUSTINE + Gait: limping

## 2024-09-27 NOTE — HISTORY OF PRESENT ILLNESS
Medication: montelukast 10mg tablet, losartan 100mg tablet,amlodipine 10mg tablet  Last office visit date: 4/25/2023  Medication Refill Protocol Failed.  Failed criteria: see below images. Sent to clinician to review.               Last OV note:     Essential hypertension, benign :  This is well controlled. Continue with current medication.       
[FreeTextEntry1] : HISTORY OF PRESENT ILLNESS: Mr. Sanz is a 66-year-old male complaining of left hip pain. The patient has a limp when walking. The patient has had this pain for 4 years, with pain worsening over the past 8 months. Patient describes the pain as moderate, being a 10/10 on the pain scale. During the last month the pain has been nearly constant with symptoms worsening no typical pattern. Pain described as burning, sharp. Pain is increased with standing, walking, exercising. Pain is not changed with lying down, sitting, relaxing, coughing/sneezing and bowel movements. Bowel or bladder habits have not changed.  ACTIVITIES: Patient could walk 5+ blocks before the pain starts. Patient can sit 2+ hours before pain starts. Patient can stand 45 minutes before pain starts. The patient never lies down because of pain. Patient uses no assistive walking device at this time. Patient has difficulty performing household chores, socializing with friends, participating in recreational activities and exercising at this time.  PRIOR PAIN TREATMENTS: Moderate relief with injection and TENS unit. Excellent relief with physical therapy and exercise.   Prior Pain Medications: tramadol, Motrin, Baclofen, tramadol, gabapentin, Norco without relief.  TODAY, 4-: Revisit encounter.   He continues today with ongoing severe lower back pain. He feels like over the last couple of days his pain has been worse than baseline pain. He has no pain radiating into the lower extremities. Pain is made worse with transitional movements. Pain is relieved with sitting. Pain is mainly axial and associated with stiffness and spasms of the lumbar spine. He has severe pain with getting up out of a seated position. When the pain comes on the pain is sudden and debilitating. Pain is present daily and constant. He has significant limitation of his ADLS. Patient denies any bowel or bladder dysfunction, incontinence, or saddle anesthesia.  He is also presenting with severe right hip pain. Pain is made worse with weight bearing. He has pain with rotational movements as well. He has sharp, stabbing pains which can be debilitating. Pain also refers into the lateral portion of the thigh and into the groin. Pain is present daily and constant.   At his last visit, he was given a short supply of oxyCODONE-Acetaminophen 5-325 mg. He does have improvement in pain and increase in function with he does use this medication. He was given a 14-day supply, 28 tablets. He has since ran out of this medication. He does also take Ibuprofen 800 mg along with Baclofen 10 mg daily.   TODAY, 7-: Revisit encounter.   Since last visit, he underwent a Bilateral L3, L4, L5 medial branch facet block on 4-. He afforded about 75% sustained relief for about 2-3 days following this procedure. His pain then slowly started to return to baseline. Pain is above the waistband area. Pain is mainly axial and associated with stiffness. Pain is made worse with standing or walking for prolonged periods of time. There is pain when trying to get up from a seated position or with any sort of transitional movements. Pain is present daily. He continues to manage his pain with Ibuprofen 800 mg and Baclofen 10 mg daily.   9-: Revisit encounter.   Since his last visit, he underwent a second confirmatory bilateral L3, L4, L5 medial branch facet block on 9-6-2024. He had about 75% sustained relief for 3-4 days following this procedure. His pain then slowly started to return to baseline. Pain continues to be axial in nature. Pain is associated with stiffness and spasms. Pain is made worse with standing or walking for prolonged periods of time. He has to hunch over at times to relieve the pain. Pain is present daily. He continues to manage his pain with Ibuprofen 800 mg and Baclofen 10 mg daily. 
[FreeTextEntry1] : HISTORY OF PRESENT ILLNESS: Mr. Sanz is a 66-year-old male complaining of left hip pain. The patient has a limp when walking. The patient has had this pain for 4 years, with pain worsening over the past 8 months. Patient describes the pain as moderate, being a 10/10 on the pain scale. During the last month the pain has been nearly constant with symptoms worsening no typical pattern. Pain described as burning, sharp. Pain is increased with standing, walking, exercising. Pain is not changed with lying down, sitting, relaxing, coughing/sneezing and bowel movements. Bowel or bladder habits have not changed.  ACTIVITIES: Patient could walk 5+ blocks before the pain starts. Patient can sit 2+ hours before pain starts. Patient can stand 45 minutes before pain starts. The patient never lies down because of pain. Patient uses no assistive walking device at this time. Patient has difficulty performing household chores, socializing with friends, participating in recreational activities and exercising at this time.  PRIOR PAIN TREATMENTS: Moderate relief with injection and TENS unit. Excellent relief with physical therapy and exercise.   Prior Pain Medications: tramadol, Motrin, Baclofen, tramadol, gabapentin, Norco without relief.  TODAY, 4-: Revisit encounter.   He continues today with ongoing severe lower back pain. He feels like over the last couple of days his pain has been worse than baseline pain. He has no pain radiating into the lower extremities. Pain is made worse with transitional movements. Pain is relieved with sitting. Pain is mainly axial and associated with stiffness and spasms of the lumbar spine. He has severe pain with getting up out of a seated position. When the pain comes on the pain is sudden and debilitating. Pain is present daily and constant. He has significant limitation of his ADLS. Patient denies any bowel or bladder dysfunction, incontinence, or saddle anesthesia.  He is also presenting with severe right hip pain. Pain is made worse with weight bearing. He has pain with rotational movements as well. He has sharp, stabbing pains which can be debilitating. Pain also refers into the lateral portion of the thigh and into the groin. Pain is present daily and constant.   At his last visit, he was given a short supply of oxyCODONE-Acetaminophen 5-325 mg. He does have improvement in pain and increase in function with he does use this medication. He was given a 14-day supply, 28 tablets. He has since ran out of this medication. He does also take Ibuprofen 800 mg along with Baclofen 10 mg daily.   TODAY, 7-: Revisit encounter.   Since last visit, he underwent a Bilateral L3, L4, L5 medial branch facet block on 4-. He afforded about 75% sustained relief for about 2-3 days following this procedure. His pain then slowly started to return to baseline. Pain is above the waistband area. Pain is mainly axial and associated with stiffness. Pain is made worse with standing or walking for prolonged periods of time. There is pain when trying to get up from a seated position or with any sort of transitional movements. Pain is present daily. He continues to manage his pain with Ibuprofen 800 mg and Baclofen 10 mg daily.   9-: Revisit encounter.   Since his last visit, he underwent a second confirmatory bilateral L3, L4, L5 medial branch facet block on 9-6-2024. He had about 75% sustained relief for 3-4 days following this procedure. His pain then slowly started to return to baseline. Pain continues to be axial in nature. Pain is associated with stiffness and spasms. Pain is made worse with standing or walking for prolonged periods of time. He has to hunch over at times to relieve the pain. Pain is present daily. He continues to manage his pain with Ibuprofen 800 mg and Baclofen 10 mg daily.

## 2024-09-27 NOTE — DISCUSSION/SUMMARY
[de-identified] : A discussion regarding available pain management treatment options occurred with the patient.  These included interventional, rehabilitative, pharmacological, and alternative modalities. We will proceed with the following:    Interventional treatment options: - Patient underwent 2 successful Bilateral L3, L4, L5 medial branch blocks with 75% sustained relief for 3-4 days. His pain then slowly started to return to baseline.  1. Proceed with a Left and Right L3, L4, L5 medial branch radiofrequency ablation with sedation.  Patient presents with axial lumbar pain that has not responded to 3 months of conservative therapy including physical therapy or NSAID therapy.  The pain is interfering with activities of daily living and functionality.  The pain is exacerbated by facet loading.  Positive Kemps maneuver which is defined by pain reproduction with extension and rotation of the lumbar spine to the affected side.  The patient has had greater than 80% to two lumbar medial branch blocks. There is no unexplained neurologic deficit.  There is no history of systemic infection, unstable medical condition, bleeding tendency, or local infection.  This intervention is being performed to treat the patient's pain coming from the lumbar facet joints.   The patient has severe anxiety of procedures that necessitates monitored anesthesia care (MAC). The procedure performed will be close to major nerves, arteries, and spinal cord and/or joint structures. Due to the proximity of these structures, we need the patient to be still during the procedure.  With the help of MAC, this will be safely achieved and decrease the risk of any complications.   Medication: - continue with Baclofen 10 mg qhs prn.  - continue with Ibuprofen 800 mg q8h prn.   I advised the patient that the NSAID should be taken with food.  In addition while taking the prescribed NSAID, no over the counter or other NSAIDs should be used, such as ibuprofen (Motrin or Advil) or naproxen (Aleve) as this can cause stomach upset or other side effects.  If needed for fever or breakthrough pain Tylenol can be used.  - Follow up 2 weeks post injection.   I Mavis Sanders attest that this documentation has been prepared under the direction and in the presence of provider Dr. Jesús Zaidi.  The documentation recorded by the scribe in my presence, accurately reflects the service I personally performed, and the decisions made by me with my edits as appropriate.   Jesús Zaidi, DO

## 2024-09-27 NOTE — DISCUSSION/SUMMARY
[de-identified] : A discussion regarding available pain management treatment options occurred with the patient.  These included interventional, rehabilitative, pharmacological, and alternative modalities. We will proceed with the following:    Interventional treatment options: - Patient underwent 2 successful Bilateral L3, L4, L5 medial branch blocks with 75% sustained relief for 3-4 days. His pain then slowly started to return to baseline.  1. Proceed with a Left and Right L3, L4, L5 medial branch radiofrequency ablation with sedation.  Patient presents with axial lumbar pain that has not responded to 3 months of conservative therapy including physical therapy or NSAID therapy.  The pain is interfering with activities of daily living and functionality.  The pain is exacerbated by facet loading.  Positive Kemps maneuver which is defined by pain reproduction with extension and rotation of the lumbar spine to the affected side.  The patient has had greater than 80% to two lumbar medial branch blocks. There is no unexplained neurologic deficit.  There is no history of systemic infection, unstable medical condition, bleeding tendency, or local infection.  This intervention is being performed to treat the patient's pain coming from the lumbar facet joints.   The patient has severe anxiety of procedures that necessitates monitored anesthesia care (MAC). The procedure performed will be close to major nerves, arteries, and spinal cord and/or joint structures. Due to the proximity of these structures, we need the patient to be still during the procedure.  With the help of MAC, this will be safely achieved and decrease the risk of any complications.   Medication: - continue with Baclofen 10 mg qhs prn.  - continue with Ibuprofen 800 mg q8h prn.   I advised the patient that the NSAID should be taken with food.  In addition while taking the prescribed NSAID, no over the counter or other NSAIDs should be used, such as ibuprofen (Motrin or Advil) or naproxen (Aleve) as this can cause stomach upset or other side effects.  If needed for fever or breakthrough pain Tylenol can be used.  - Follow up 2 weeks post injection.   I Mavis Sanders attest that this documentation has been prepared under the direction and in the presence of provider Dr. Jesús Zaidi.  The documentation recorded by the scribe in my presence, accurately reflects the service I personally performed, and the decisions made by me with my edits as appropriate.   Jesús Zaidi, DO

## 2024-09-27 NOTE — PHYSICAL EXAM
[de-identified] : L spine   No rashes, erythema, edema noted TTP b/l lumbar paraspinal muscles Positive facet loading bilaterally Positive KEMPS test bilaterally LLE: 5/5 strength RLE: 5/5 strength Sensation intact b/l LE   L hip  No rashes, erythema, edema  TTP at anterior hip, gluteus, GTB Stability: no gross instability  ROM: Painful ROM  AUGUSTINE + Gait: limping with weight bearing   R hip   No rashes, erythema, edema TTP at anterior hip, gluteus, GTB Stability: no gross instability ROM: Painful & limited ROM  AUGUSTINE + Gait: limping

## 2024-10-11 ENCOUNTER — NON-APPOINTMENT (OUTPATIENT)
Age: 69
End: 2024-10-11

## 2024-10-15 ENCOUNTER — APPOINTMENT (OUTPATIENT)
Dept: ORTHOPEDIC SURGERY | Facility: CLINIC | Age: 69
End: 2024-10-15
Payer: COMMERCIAL

## 2024-10-15 DIAGNOSIS — M18.0 BILATERAL PRIMARY OSTEOARTHRITIS OF FIRST CARPOMETACARPAL JOINTS: ICD-10-CM

## 2024-10-15 PROCEDURE — 73110 X-RAY EXAM OF WRIST: CPT | Mod: 50

## 2024-10-15 PROCEDURE — 99204 OFFICE O/P NEW MOD 45 MIN: CPT

## 2024-10-29 ENCOUNTER — APPOINTMENT (OUTPATIENT)
Dept: PAIN MANAGEMENT | Facility: CLINIC | Age: 69
End: 2024-10-29
Payer: OTHER MISCELLANEOUS

## 2024-10-29 VITALS — HEIGHT: 68 IN | WEIGHT: 245 LBS | BODY MASS INDEX: 37.13 KG/M2

## 2024-10-29 DIAGNOSIS — M16.11 UNILATERAL PRIMARY OSTEOARTHRITIS, RIGHT HIP: ICD-10-CM

## 2024-10-29 PROCEDURE — 99214 OFFICE O/P EST MOD 30 MIN: CPT

## 2024-11-11 ENCOUNTER — NON-APPOINTMENT (OUTPATIENT)
Age: 69
End: 2024-11-11

## 2024-11-14 ENCOUNTER — APPOINTMENT (OUTPATIENT)
Dept: PAIN MANAGEMENT | Facility: CLINIC | Age: 69
End: 2024-11-14

## 2024-11-15 ENCOUNTER — APPOINTMENT (OUTPATIENT)
Facility: CLINIC | Age: 69
End: 2024-11-15

## 2024-12-02 ENCOUNTER — NON-APPOINTMENT (OUTPATIENT)
Age: 69
End: 2024-12-02

## 2024-12-03 ENCOUNTER — APPOINTMENT (OUTPATIENT)
Dept: PAIN MANAGEMENT | Facility: CLINIC | Age: 69
End: 2024-12-03
Payer: OTHER MISCELLANEOUS

## 2024-12-03 DIAGNOSIS — M47.816 SPONDYLOSIS W/OUT MYELOPATHY OR RADICULOPATHY, LUMBAR REGION: ICD-10-CM

## 2024-12-03 PROCEDURE — 99214 OFFICE O/P EST MOD 30 MIN: CPT

## 2024-12-11 ENCOUNTER — NON-APPOINTMENT (OUTPATIENT)
Age: 69
End: 2024-12-11

## 2024-12-17 ENCOUNTER — APPOINTMENT (OUTPATIENT)
Dept: CARDIOLOGY | Facility: CLINIC | Age: 69
End: 2024-12-17
Payer: COMMERCIAL

## 2024-12-17 VITALS
DIASTOLIC BLOOD PRESSURE: 70 MMHG | OXYGEN SATURATION: 98 % | HEIGHT: 68 IN | WEIGHT: 249 LBS | BODY MASS INDEX: 37.74 KG/M2 | SYSTOLIC BLOOD PRESSURE: 136 MMHG | HEART RATE: 56 BPM

## 2024-12-17 VITALS — RESPIRATION RATE: 18 BRPM | HEART RATE: 54 BPM | DIASTOLIC BLOOD PRESSURE: 70 MMHG | SYSTOLIC BLOOD PRESSURE: 126 MMHG

## 2024-12-17 DIAGNOSIS — E78.5 HYPERLIPIDEMIA, UNSPECIFIED: ICD-10-CM

## 2024-12-17 DIAGNOSIS — I10 ESSENTIAL (PRIMARY) HYPERTENSION: ICD-10-CM

## 2024-12-17 DIAGNOSIS — I34.0 NONRHEUMATIC MITRAL (VALVE) INSUFFICIENCY: ICD-10-CM

## 2024-12-17 DIAGNOSIS — R60.9 EDEMA, UNSPECIFIED: ICD-10-CM

## 2024-12-17 DIAGNOSIS — G47.30 SLEEP APNEA, UNSPECIFIED: ICD-10-CM

## 2024-12-17 PROCEDURE — 99214 OFFICE O/P EST MOD 30 MIN: CPT | Mod: 25

## 2024-12-17 PROCEDURE — 93000 ELECTROCARDIOGRAM COMPLETE: CPT

## 2024-12-17 RX ORDER — SEMAGLUTIDE 1.34 MG/ML
2 INJECTION, SOLUTION SUBCUTANEOUS
Refills: 0 | Status: ACTIVE | COMMUNITY

## 2025-02-12 ENCOUNTER — RX RENEWAL (OUTPATIENT)
Age: 70
End: 2025-02-12

## 2025-03-04 ENCOUNTER — APPOINTMENT (OUTPATIENT)
Dept: PAIN MANAGEMENT | Facility: CLINIC | Age: 70
End: 2025-03-04
Payer: OTHER MISCELLANEOUS

## 2025-03-04 DIAGNOSIS — M47.9 SPONDYLOSIS, UNSPECIFIED: ICD-10-CM

## 2025-03-04 DIAGNOSIS — M47.816 SPONDYLOSIS W/OUT MYELOPATHY OR RADICULOPATHY, LUMBAR REGION: ICD-10-CM

## 2025-03-04 PROCEDURE — 99213 OFFICE O/P EST LOW 20 MIN: CPT

## 2025-03-06 ENCOUNTER — APPOINTMENT (OUTPATIENT)
Dept: CARDIOLOGY | Facility: CLINIC | Age: 70
End: 2025-03-06

## 2025-03-06 ENCOUNTER — APPOINTMENT (OUTPATIENT)
Dept: CARDIOLOGY | Facility: CLINIC | Age: 70
End: 2025-03-06
Payer: COMMERCIAL

## 2025-03-06 PROCEDURE — 93306 TTE W/DOPPLER COMPLETE: CPT

## 2025-03-10 ENCOUNTER — RESULT REVIEW (OUTPATIENT)
Age: 70
End: 2025-03-10

## 2025-03-10 ENCOUNTER — OUTPATIENT (OUTPATIENT)
Dept: OUTPATIENT SERVICES | Facility: HOSPITAL | Age: 70
LOS: 1 days | End: 2025-03-10
Payer: COMMERCIAL

## 2025-03-10 DIAGNOSIS — I51.9 HEART DISEASE, UNSPECIFIED: ICD-10-CM

## 2025-03-10 DIAGNOSIS — Z98.890 OTHER SPECIFIED POSTPROCEDURAL STATES: Chronic | ICD-10-CM

## 2025-03-10 DIAGNOSIS — Z96.652 PRESENCE OF LEFT ARTIFICIAL KNEE JOINT: Chronic | ICD-10-CM

## 2025-03-10 DIAGNOSIS — Z00.8 ENCOUNTER FOR OTHER GENERAL EXAMINATION: ICD-10-CM

## 2025-03-10 PROCEDURE — 78452 HT MUSCLE IMAGE SPECT MULT: CPT

## 2025-03-10 PROCEDURE — 78452 HT MUSCLE IMAGE SPECT MULT: CPT | Mod: 26

## 2025-03-10 PROCEDURE — A9500: CPT

## 2025-03-10 PROCEDURE — 93018 CV STRESS TEST I&R ONLY: CPT

## 2025-03-11 DIAGNOSIS — I51.9 HEART DISEASE, UNSPECIFIED: ICD-10-CM

## 2025-03-12 ENCOUNTER — APPOINTMENT (OUTPATIENT)
Dept: CARDIOLOGY | Facility: CLINIC | Age: 70
End: 2025-03-12
Payer: COMMERCIAL

## 2025-03-12 ENCOUNTER — NON-APPOINTMENT (OUTPATIENT)
Age: 70
End: 2025-03-12

## 2025-03-12 VITALS — WEIGHT: 245 LBS | BODY MASS INDEX: 37.25 KG/M2

## 2025-03-12 VITALS — RESPIRATION RATE: 18 BRPM | HEART RATE: 62 BPM | DIASTOLIC BLOOD PRESSURE: 70 MMHG | SYSTOLIC BLOOD PRESSURE: 110 MMHG

## 2025-03-12 DIAGNOSIS — I34.0 NONRHEUMATIC MITRAL (VALVE) INSUFFICIENCY: ICD-10-CM

## 2025-03-12 DIAGNOSIS — Z01.810 ENCOUNTER FOR PREPROCEDURAL CARDIOVASCULAR EXAMINATION: ICD-10-CM

## 2025-03-12 DIAGNOSIS — R60.9 EDEMA, UNSPECIFIED: ICD-10-CM

## 2025-03-12 DIAGNOSIS — E78.5 HYPERLIPIDEMIA, UNSPECIFIED: ICD-10-CM

## 2025-03-12 DIAGNOSIS — I10 ESSENTIAL (PRIMARY) HYPERTENSION: ICD-10-CM

## 2025-03-12 DIAGNOSIS — G47.30 SLEEP APNEA, UNSPECIFIED: ICD-10-CM

## 2025-03-12 PROCEDURE — 93000 ELECTROCARDIOGRAM COMPLETE: CPT

## 2025-03-12 PROCEDURE — G2211 COMPLEX E/M VISIT ADD ON: CPT | Mod: NC

## 2025-03-12 PROCEDURE — 99214 OFFICE O/P EST MOD 30 MIN: CPT

## 2025-04-01 ENCOUNTER — APPOINTMENT (OUTPATIENT)
Dept: PAIN MANAGEMENT | Facility: CLINIC | Age: 70
End: 2025-04-01
Payer: OTHER MISCELLANEOUS

## 2025-04-01 DIAGNOSIS — M47.816 SPONDYLOSIS W/OUT MYELOPATHY OR RADICULOPATHY, LUMBAR REGION: ICD-10-CM

## 2025-04-01 DIAGNOSIS — M47.9 SPONDYLOSIS, UNSPECIFIED: ICD-10-CM

## 2025-04-01 PROCEDURE — 99214 OFFICE O/P EST MOD 30 MIN: CPT

## 2025-05-09 ENCOUNTER — RESULT REVIEW (OUTPATIENT)
Age: 70
End: 2025-05-09

## 2025-05-09 ENCOUNTER — TRANSCRIPTION ENCOUNTER (OUTPATIENT)
Age: 70
End: 2025-05-09

## 2025-05-09 ENCOUNTER — OUTPATIENT (OUTPATIENT)
Dept: OUTPATIENT SERVICES | Facility: HOSPITAL | Age: 70
LOS: 1 days | Discharge: HOME | End: 2025-05-09
Payer: COMMERCIAL

## 2025-05-09 VITALS
OXYGEN SATURATION: 97 % | SYSTOLIC BLOOD PRESSURE: 138 MMHG | WEIGHT: 242.95 LBS | TEMPERATURE: 98 F | RESPIRATION RATE: 18 BRPM | DIASTOLIC BLOOD PRESSURE: 74 MMHG | HEIGHT: 68 IN | HEART RATE: 72 BPM

## 2025-05-09 VITALS
DIASTOLIC BLOOD PRESSURE: 61 MMHG | OXYGEN SATURATION: 96 % | RESPIRATION RATE: 18 BRPM | HEART RATE: 63 BPM | SYSTOLIC BLOOD PRESSURE: 130 MMHG

## 2025-05-09 DIAGNOSIS — R93.3 ABNORMAL FINDINGS ON DIAGNOSTIC IMAGING OF OTHER PARTS OF DIGESTIVE TRACT: ICD-10-CM

## 2025-05-09 DIAGNOSIS — K29.50 UNSPECIFIED CHRONIC GASTRITIS WITHOUT BLEEDING: ICD-10-CM

## 2025-05-09 DIAGNOSIS — E78.5 HYPERLIPIDEMIA, UNSPECIFIED: ICD-10-CM

## 2025-05-09 DIAGNOSIS — K86.2 CYST OF PANCREAS: ICD-10-CM

## 2025-05-09 DIAGNOSIS — Z88.0 ALLERGY STATUS TO PENICILLIN: ICD-10-CM

## 2025-05-09 DIAGNOSIS — N40.0 BENIGN PROSTATIC HYPERPLASIA WITHOUT LOWER URINARY TRACT SYMPTOMS: ICD-10-CM

## 2025-05-09 DIAGNOSIS — I10 ESSENTIAL (PRIMARY) HYPERTENSION: ICD-10-CM

## 2025-05-09 DIAGNOSIS — Z98.890 OTHER SPECIFIED POSTPROCEDURAL STATES: Chronic | ICD-10-CM

## 2025-05-09 DIAGNOSIS — K21.00 GASTRO-ESOPHAGEAL REFLUX DISEASE WITH ESOPHAGITIS, WITHOUT BLEEDING: ICD-10-CM

## 2025-05-09 DIAGNOSIS — Z96.652 PRESENCE OF LEFT ARTIFICIAL KNEE JOINT: Chronic | ICD-10-CM

## 2025-05-09 DIAGNOSIS — M10.9 GOUT, UNSPECIFIED: ICD-10-CM

## 2025-05-09 PROCEDURE — 88312 SPECIAL STAINS GROUP 1: CPT

## 2025-05-09 PROCEDURE — 43239 EGD BIOPSY SINGLE/MULTIPLE: CPT | Mod: XU

## 2025-05-09 PROCEDURE — 82378 CARCINOEMBRYONIC ANTIGEN: CPT

## 2025-05-09 PROCEDURE — 82150 ASSAY OF AMYLASE: CPT

## 2025-05-09 PROCEDURE — 88305 TISSUE EXAM BY PATHOLOGIST: CPT

## 2025-05-09 PROCEDURE — 43242 EGD US FINE NEEDLE BX/ASPIR: CPT

## 2025-05-09 PROCEDURE — 88312 SPECIAL STAINS GROUP 1: CPT | Mod: 26

## 2025-05-09 PROCEDURE — 82945 GLUCOSE OTHER FLUID: CPT

## 2025-05-09 PROCEDURE — 88173 CYTOPATH EVAL FNA REPORT: CPT

## 2025-05-09 PROCEDURE — 88173 CYTOPATH EVAL FNA REPORT: CPT | Mod: 26

## 2025-05-09 PROCEDURE — 88305 TISSUE EXAM BY PATHOLOGIST: CPT | Mod: 26

## 2025-05-09 RX ORDER — CIPROFLOXACIN HCL 250 MG
400 TABLET ORAL ONCE
Refills: 0 | Status: COMPLETED | OUTPATIENT
Start: 2025-05-09 | End: 2025-05-09

## 2025-05-09 RX ORDER — CIPROFLOXACIN HCL 250 MG
400 TABLET ORAL EVERY 12 HOURS
Refills: 0 | Status: DISCONTINUED | OUTPATIENT
Start: 2025-05-09 | End: 2025-05-09

## 2025-05-09 RX ORDER — CIPROFLOXACIN HCL 250 MG
TABLET ORAL
Refills: 0 | Status: DISCONTINUED | OUTPATIENT
Start: 2025-05-09 | End: 2025-05-09

## 2025-05-09 RX ADMIN — Medication 200 MILLIGRAM(S): at 11:46

## 2025-05-09 NOTE — ASU PATIENT PROFILE, ADULT - FALL HARM RISK - HARM RISK INTERVENTIONS

## 2025-05-09 NOTE — ASU DISCHARGE PLAN (ADULT/PEDIATRIC) - NS MD DC FALL RISK RISK
For information on Fall & Injury Prevention, visit: [email or web address] OR  [email or web address] OR  https://www.cdc.gov/steadi/patient.html

## 2025-05-09 NOTE — ASU DISCHARGE PLAN (ADULT/PEDIATRIC) - FINANCIAL ASSISTANCE
[facility] provides services at a reduced cost to those who are determined to be eligible through [facility]’s financial assistance program. Information regarding [facility]’s financial assistance program can be found by going to [email or web address] or by calling [phone].

## 2025-05-10 LAB
AMYLASE FLD-CCNC: SIGNIFICANT CHANGE UP U/L
GLUCOSE FLD-MCNC: <2 MG/DL — SIGNIFICANT CHANGE UP

## 2025-05-13 LAB
CEA FLD-MCNC: 21.2 NG/ML — SIGNIFICANT CHANGE UP
SPECIMEN SOURCE: SIGNIFICANT CHANGE UP

## 2025-05-14 LAB — NON-GYNECOLOGICAL CYTOLOGY STUDY: SIGNIFICANT CHANGE UP

## 2025-05-16 LAB — SURGICAL PATHOLOGY STUDY: SIGNIFICANT CHANGE UP

## 2025-05-20 ENCOUNTER — APPOINTMENT (OUTPATIENT)
Dept: PAIN MANAGEMENT | Facility: CLINIC | Age: 70
End: 2025-05-20

## 2025-05-20 ENCOUNTER — APPOINTMENT (OUTPATIENT)
Dept: PAIN MANAGEMENT | Facility: CLINIC | Age: 70
End: 2025-05-20
Payer: OTHER MISCELLANEOUS

## 2025-05-20 DIAGNOSIS — M47.816 SPONDYLOSIS W/OUT MYELOPATHY OR RADICULOPATHY, LUMBAR REGION: ICD-10-CM

## 2025-05-20 DIAGNOSIS — M54.16 RADICULOPATHY, LUMBAR REGION: ICD-10-CM

## 2025-05-20 DIAGNOSIS — M16.11 UNILATERAL PRIMARY OSTEOARTHRITIS, RIGHT HIP: ICD-10-CM

## 2025-05-20 PROCEDURE — 99214 OFFICE O/P EST MOD 30 MIN: CPT

## 2025-05-27 ENCOUNTER — NON-APPOINTMENT (OUTPATIENT)
Age: 70
End: 2025-05-27

## 2025-06-12 ENCOUNTER — APPOINTMENT (OUTPATIENT)
Dept: PAIN MANAGEMENT | Facility: CLINIC | Age: 70
End: 2025-06-12
Payer: OTHER MISCELLANEOUS

## 2025-06-12 PROCEDURE — 99214 OFFICE O/P EST MOD 30 MIN: CPT

## 2025-06-16 ENCOUNTER — APPOINTMENT (OUTPATIENT)
Dept: CARDIOLOGY | Facility: CLINIC | Age: 70
End: 2025-06-16
Payer: COMMERCIAL

## 2025-06-16 VITALS — HEIGHT: 68 IN | BODY MASS INDEX: 36.98 KG/M2 | WEIGHT: 244 LBS

## 2025-06-16 VITALS — HEART RATE: 59 BPM | RESPIRATION RATE: 18 BRPM | DIASTOLIC BLOOD PRESSURE: 80 MMHG | SYSTOLIC BLOOD PRESSURE: 130 MMHG

## 2025-06-16 PROCEDURE — 93000 ELECTROCARDIOGRAM COMPLETE: CPT

## 2025-06-16 PROCEDURE — 99214 OFFICE O/P EST MOD 30 MIN: CPT

## 2025-06-16 PROCEDURE — G2211 COMPLEX E/M VISIT ADD ON: CPT | Mod: NC
